# Patient Record
Sex: FEMALE | Race: ASIAN | Employment: OTHER | URBAN - METROPOLITAN AREA
[De-identification: names, ages, dates, MRNs, and addresses within clinical notes are randomized per-mention and may not be internally consistent; named-entity substitution may affect disease eponyms.]

---

## 2017-04-04 PROBLEM — F03.90 DEMENTIA (HCC): Status: ACTIVE | Noted: 2017-04-04

## 2017-04-04 PROBLEM — E78.5 HYPERLIPIDEMIA: Status: ACTIVE | Noted: 2017-04-04

## 2017-04-04 PROBLEM — F41.9 ANXIETY: Status: ACTIVE | Noted: 2017-04-04

## 2017-04-04 PROBLEM — M19.90 OSTEOARTHRITIS: Status: ACTIVE | Noted: 2017-04-04

## 2017-04-04 PROBLEM — I10 HYPERTENSION: Status: ACTIVE | Noted: 2017-04-04

## 2017-04-04 PROBLEM — E03.9 HYPOTHYROIDISM: Status: ACTIVE | Noted: 2017-04-04

## 2017-04-04 PROBLEM — R42 DIZZINESS: Chronic | Status: ACTIVE | Noted: 2017-04-04

## 2017-04-04 PROBLEM — I73.9 PVD (PERIPHERAL VASCULAR DISEASE) (HCC): Status: ACTIVE | Noted: 2017-04-04

## 2017-04-22 ENCOUNTER — APPOINTMENT (OUTPATIENT)
Dept: CT IMAGING | Age: 82
DRG: 064 | End: 2017-04-22
Attending: EMERGENCY MEDICINE
Payer: MEDICARE

## 2017-04-22 ENCOUNTER — HOSPITAL ENCOUNTER (INPATIENT)
Age: 82
LOS: 9 days | Discharge: NURSING, REHAB OR LONG-TERM CARE | DRG: 064 | End: 2017-05-01
Attending: EMERGENCY MEDICINE | Admitting: HOSPITALIST
Payer: MEDICARE

## 2017-04-22 ENCOUNTER — APPOINTMENT (OUTPATIENT)
Dept: GENERAL RADIOLOGY | Age: 82
DRG: 064 | End: 2017-04-22
Attending: HOSPITALIST
Payer: MEDICARE

## 2017-04-22 DIAGNOSIS — I63.9 CEREBROVASCULAR ACCIDENT (CVA), UNSPECIFIED MECHANISM (HCC): Primary | ICD-10-CM

## 2017-04-22 PROBLEM — I16.0 HYPERTENSIVE URGENCY: Status: ACTIVE | Noted: 2017-04-22

## 2017-04-22 PROBLEM — G45.9 TIA (TRANSIENT ISCHEMIC ATTACK): Status: ACTIVE | Noted: 2017-04-22

## 2017-04-22 PROBLEM — I48.0 PAROXYSMAL ATRIAL FIBRILLATION (HCC): Chronic | Status: ACTIVE | Noted: 2017-04-22

## 2017-04-22 LAB
ALBUMIN SERPL BCP-MCNC: 3.8 G/DL (ref 3.2–4.6)
ALBUMIN/GLOB SERPL: 1 {RATIO} (ref 1.2–3.5)
ALP SERPL-CCNC: 82 U/L (ref 50–136)
ALT SERPL-CCNC: 32 U/L (ref 12–65)
ANION GAP BLD CALC-SCNC: 9 MMOL/L (ref 7–16)
APPEARANCE UR: ABNORMAL
APTT PPP: 103.9 SEC (ref 23.5–31.7)
APTT PPP: 30.6 SEC (ref 23.5–31.7)
AST SERPL W P-5'-P-CCNC: 71 U/L (ref 15–37)
ATRIAL RATE: 107 BPM
ATRIAL RATE: 166 BPM
ATRIAL RATE: 85 BPM
BACTERIA SPEC CULT: NORMAL
BACTERIA URNS QL MICRO: ABNORMAL /HPF
BASOPHILS # BLD AUTO: 0 K/UL (ref 0–0.2)
BASOPHILS # BLD AUTO: 0 K/UL (ref 0–0.2)
BASOPHILS # BLD: 0 % (ref 0–2)
BASOPHILS # BLD: 0 % (ref 0–2)
BILIRUB SERPL-MCNC: 1 MG/DL (ref 0.2–1.1)
BILIRUB UR QL: NEGATIVE
BUN SERPL-MCNC: 27 MG/DL (ref 8–23)
CALCIUM SERPL-MCNC: 8.9 MG/DL (ref 8.3–10.4)
CALCULATED R AXIS, ECG10: 52 DEGREES
CALCULATED R AXIS, ECG10: 55 DEGREES
CALCULATED R AXIS, ECG10: 59 DEGREES
CALCULATED T AXIS, ECG11: -16 DEGREES
CALCULATED T AXIS, ECG11: -164 DEGREES
CALCULATED T AXIS, ECG11: 92 DEGREES
CHLORIDE SERPL-SCNC: 107 MMOL/L (ref 98–107)
CK SERPL-CCNC: 2134 U/L (ref 21–215)
CO2 SERPL-SCNC: 28 MMOL/L (ref 21–32)
COLOR UR: YELLOW
CREAT SERPL-MCNC: 0.94 MG/DL (ref 0.6–1)
DIAGNOSIS, 93000: NORMAL
DIFFERENTIAL METHOD BLD: ABNORMAL
DIFFERENTIAL METHOD BLD: ABNORMAL
EOSINOPHIL # BLD: 0 K/UL (ref 0–0.8)
EOSINOPHIL # BLD: 0 K/UL (ref 0–0.8)
EOSINOPHIL NFR BLD: 0 % (ref 0.5–7.8)
EOSINOPHIL NFR BLD: 0 % (ref 0.5–7.8)
EPI CELLS #/AREA URNS HPF: ABNORMAL /HPF
ERYTHROCYTE [DISTWIDTH] IN BLOOD BY AUTOMATED COUNT: 13.6 % (ref 11.9–14.6)
ERYTHROCYTE [DISTWIDTH] IN BLOOD BY AUTOMATED COUNT: 13.8 % (ref 11.9–14.6)
GLOBULIN SER CALC-MCNC: 3.7 G/DL (ref 2.3–3.5)
GLUCOSE BLD STRIP.AUTO-MCNC: 109 MG/DL (ref 65–100)
GLUCOSE BLD STRIP.AUTO-MCNC: 141 MG/DL (ref 65–100)
GLUCOSE SERPL-MCNC: 110 MG/DL (ref 65–100)
GLUCOSE UR STRIP.AUTO-MCNC: NEGATIVE MG/DL
HCT VFR BLD AUTO: 52.4 % (ref 35.8–46.3)
HCT VFR BLD AUTO: 53.2 % (ref 35.8–46.3)
HGB BLD-MCNC: 17.3 G/DL (ref 11.7–15.4)
HGB BLD-MCNC: 17.7 G/DL (ref 11.7–15.4)
HGB UR QL STRIP: ABNORMAL
IMM GRANULOCYTES # BLD: 0 K/UL (ref 0–0.5)
IMM GRANULOCYTES # BLD: 0 K/UL (ref 0–0.5)
IMM GRANULOCYTES NFR BLD AUTO: 0.2 % (ref 0–5)
IMM GRANULOCYTES NFR BLD AUTO: 0.3 % (ref 0–5)
INR BLD: 1.1 (ref 0.9–1.2)
KETONES UR QL STRIP.AUTO: >80 MG/DL
LACTATE BLD-SCNC: 1.8 MMOL/L (ref 0.5–1.9)
LEUKOCYTE ESTERASE UR QL STRIP.AUTO: ABNORMAL
LYMPHOCYTES # BLD AUTO: 11 % (ref 13–44)
LYMPHOCYTES # BLD AUTO: 7 % (ref 13–44)
LYMPHOCYTES # BLD: 0.8 K/UL (ref 0.5–4.6)
LYMPHOCYTES # BLD: 1.2 K/UL (ref 0.5–4.6)
MCH RBC QN AUTO: 29.7 PG (ref 26.1–32.9)
MCH RBC QN AUTO: 29.7 PG (ref 26.1–32.9)
MCHC RBC AUTO-ENTMCNC: 33 G/DL (ref 31.4–35)
MCHC RBC AUTO-ENTMCNC: 33.3 G/DL (ref 31.4–35)
MCV RBC AUTO: 89.4 FL (ref 79.6–97.8)
MCV RBC AUTO: 89.9 FL (ref 79.6–97.8)
MONOCYTES # BLD: 0.6 K/UL (ref 0.1–1.3)
MONOCYTES # BLD: 0.9 K/UL (ref 0.1–1.3)
MONOCYTES NFR BLD AUTO: 5 % (ref 4–12)
MONOCYTES NFR BLD AUTO: 8 % (ref 4–12)
MYOGLOBIN SERPL-MCNC: 1900 NG/ML (ref 9–82)
NEUTS SEG # BLD: 10.4 K/UL (ref 1.7–8.2)
NEUTS SEG # BLD: 9 K/UL (ref 1.7–8.2)
NEUTS SEG NFR BLD AUTO: 81 % (ref 43–78)
NEUTS SEG NFR BLD AUTO: 88 % (ref 43–78)
NITRITE UR QL STRIP.AUTO: POSITIVE
OTHER OBSERVATIONS,UCOM: ABNORMAL
PH UR STRIP: 6 [PH] (ref 5–9)
PLATELET # BLD AUTO: 137 K/UL (ref 150–450)
PLATELET # BLD AUTO: 141 K/UL (ref 150–450)
PMV BLD AUTO: 14.1 FL (ref 10.8–14.1)
PMV BLD AUTO: ABNORMAL FL (ref 10.8–14.1)
POTASSIUM SERPL-SCNC: 4.1 MMOL/L (ref 3.5–5.1)
PROT SERPL-MCNC: 7.5 G/DL (ref 6.3–8.2)
PROT UR STRIP-MCNC: 100 MG/DL
PT BLD: 13.6 SECS (ref 9.6–11.6)
Q-T INTERVAL, ECG07: 266 MS
Q-T INTERVAL, ECG07: 294 MS
Q-T INTERVAL, ECG07: 316 MS
QRS DURATION, ECG06: 66 MS
QRS DURATION, ECG06: 72 MS
QRS DURATION, ECG06: 74 MS
QTC CALCULATION (BEZET), ECG08: 395 MS
QTC CALCULATION (BEZET), ECG08: 413 MS
QTC CALCULATION (BEZET), ECG08: 436 MS
RBC # BLD AUTO: 5.83 M/UL (ref 4.05–5.25)
RBC # BLD AUTO: 5.95 M/UL (ref 4.05–5.25)
RBC #/AREA URNS HPF: ABNORMAL /HPF
SERVICE CMNT-IMP: NORMAL
SODIUM SERPL-SCNC: 144 MMOL/L (ref 136–145)
SP GR UR REFRACTOMETRY: 1.03 (ref 1–1.02)
TROPONIN I BLD-MCNC: 0.09 NG/ML (ref 0–0.08)
TROPONIN I SERPL-MCNC: 0.22 NG/ML (ref 0.02–0.05)
UROBILINOGEN UR QL STRIP.AUTO: 0.2 EU/DL (ref 0.2–1)
VENTRICULAR RATE, ECG03: 103 BPM
VENTRICULAR RATE, ECG03: 109 BPM
VENTRICULAR RATE, ECG03: 162 BPM
WBC # BLD AUTO: 11.2 K/UL (ref 4.3–11.1)
WBC # BLD AUTO: 11.9 K/UL (ref 4.3–11.1)
WBC URNS QL MICRO: >100 /HPF

## 2017-04-22 PROCEDURE — 84484 ASSAY OF TROPONIN QUANT: CPT

## 2017-04-22 PROCEDURE — 74011000250 HC RX REV CODE- 250: Performed by: HOSPITALIST

## 2017-04-22 PROCEDURE — 77030034849

## 2017-04-22 PROCEDURE — 87088 URINE BACTERIA CULTURE: CPT | Performed by: HOSPITALIST

## 2017-04-22 PROCEDURE — 0T9B70Z DRAINAGE OF BLADDER WITH DRAINAGE DEVICE, VIA NATURAL OR ARTIFICIAL OPENING: ICD-10-PCS | Performed by: HOSPITALIST

## 2017-04-22 PROCEDURE — 96365 THER/PROPH/DIAG IV INF INIT: CPT | Performed by: EMERGENCY MEDICINE

## 2017-04-22 PROCEDURE — 97162 PT EVAL MOD COMPLEX 30 MIN: CPT

## 2017-04-22 PROCEDURE — 74011250636 HC RX REV CODE- 250/636: Performed by: HOSPITALIST

## 2017-04-22 PROCEDURE — 74011000258 HC RX REV CODE- 258: Performed by: INTERNAL MEDICINE

## 2017-04-22 PROCEDURE — 74011000250 HC RX REV CODE- 250: Performed by: EMERGENCY MEDICINE

## 2017-04-22 PROCEDURE — 77030019605

## 2017-04-22 PROCEDURE — 81001 URINALYSIS AUTO W/SCOPE: CPT | Performed by: EMERGENCY MEDICINE

## 2017-04-22 PROCEDURE — 93005 ELECTROCARDIOGRAM TRACING: CPT | Performed by: EMERGENCY MEDICINE

## 2017-04-22 PROCEDURE — 87040 BLOOD CULTURE FOR BACTERIA: CPT | Performed by: HOSPITALIST

## 2017-04-22 PROCEDURE — 85610 PROTHROMBIN TIME: CPT

## 2017-04-22 PROCEDURE — 74011000250 HC RX REV CODE- 250

## 2017-04-22 PROCEDURE — 87186 SC STD MICRODIL/AGAR DIL: CPT | Performed by: HOSPITALIST

## 2017-04-22 PROCEDURE — 92610 EVALUATE SWALLOWING FUNCTION: CPT

## 2017-04-22 PROCEDURE — 85730 THROMBOPLASTIN TIME PARTIAL: CPT | Performed by: HOSPITALIST

## 2017-04-22 PROCEDURE — 71010 XR CHEST PORT: CPT

## 2017-04-22 PROCEDURE — 36415 COLL VENOUS BLD VENIPUNCTURE: CPT | Performed by: HOSPITALIST

## 2017-04-22 PROCEDURE — 93306 TTE W/DOPPLER COMPLETE: CPT

## 2017-04-22 PROCEDURE — 83874 ASSAY OF MYOGLOBIN: CPT | Performed by: EMERGENCY MEDICINE

## 2017-04-22 PROCEDURE — 70450 CT HEAD/BRAIN W/O DYE: CPT

## 2017-04-22 PROCEDURE — 74011250636 HC RX REV CODE- 250/636: Performed by: EMERGENCY MEDICINE

## 2017-04-22 PROCEDURE — 95816 EEG AWAKE AND DROWSY: CPT | Performed by: HOSPITALIST

## 2017-04-22 PROCEDURE — 96372 THER/PROPH/DIAG INJ SC/IM: CPT | Performed by: EMERGENCY MEDICINE

## 2017-04-22 PROCEDURE — 96375 TX/PRO/DX INJ NEW DRUG ADDON: CPT | Performed by: EMERGENCY MEDICINE

## 2017-04-22 PROCEDURE — 77030032490 HC SLV COMPR SCD KNE COVD -B

## 2017-04-22 PROCEDURE — 65610000001 HC ROOM ICU GENERAL

## 2017-04-22 PROCEDURE — 97112 NEUROMUSCULAR REEDUCATION: CPT

## 2017-04-22 PROCEDURE — 82962 GLUCOSE BLOOD TEST: CPT

## 2017-04-22 PROCEDURE — 77010033678 HC OXYGEN DAILY

## 2017-04-22 PROCEDURE — 87086 URINE CULTURE/COLONY COUNT: CPT | Performed by: HOSPITALIST

## 2017-04-22 PROCEDURE — 80053 COMPREHEN METABOLIC PANEL: CPT | Performed by: EMERGENCY MEDICINE

## 2017-04-22 PROCEDURE — 99285 EMERGENCY DEPT VISIT HI MDM: CPT | Performed by: EMERGENCY MEDICINE

## 2017-04-22 PROCEDURE — 82550 ASSAY OF CK (CPK): CPT | Performed by: EMERGENCY MEDICINE

## 2017-04-22 PROCEDURE — 83605 ASSAY OF LACTIC ACID: CPT

## 2017-04-22 PROCEDURE — 87641 MR-STAPH DNA AMP PROBE: CPT | Performed by: HOSPITALIST

## 2017-04-22 PROCEDURE — 74011000258 HC RX REV CODE- 258: Performed by: HOSPITALIST

## 2017-04-22 PROCEDURE — 74011250636 HC RX REV CODE- 250/636: Performed by: INTERNAL MEDICINE

## 2017-04-22 PROCEDURE — 85025 COMPLETE CBC W/AUTO DIFF WBC: CPT | Performed by: EMERGENCY MEDICINE

## 2017-04-22 RX ORDER — HEPARIN SODIUM 5000 [USP'U]/ML
60 INJECTION, SOLUTION INTRAVENOUS; SUBCUTANEOUS ONCE
Status: COMPLETED | OUTPATIENT
Start: 2017-04-22 | End: 2017-04-22

## 2017-04-22 RX ORDER — HEPARIN SODIUM 5000 [USP'U]/100ML
12-25 INJECTION, SOLUTION INTRAVENOUS
Status: DISCONTINUED | OUTPATIENT
Start: 2017-04-22 | End: 2017-04-25

## 2017-04-22 RX ORDER — ASPIRIN 81 MG/1
81 TABLET ORAL DAILY
Status: DISCONTINUED | OUTPATIENT
Start: 2017-04-22 | End: 2017-04-23

## 2017-04-22 RX ORDER — ENOXAPARIN SODIUM 100 MG/ML
40 INJECTION SUBCUTANEOUS EVERY 24 HOURS
Status: DISCONTINUED | OUTPATIENT
Start: 2017-04-22 | End: 2017-04-22

## 2017-04-22 RX ORDER — DILTIAZEM HYDROCHLORIDE 5 MG/ML
INJECTION INTRAVENOUS
Status: COMPLETED
Start: 2017-04-22 | End: 2017-04-22

## 2017-04-22 RX ORDER — DILTIAZEM HYDROCHLORIDE 5 MG/ML
20 INJECTION INTRAVENOUS ONCE
Status: COMPLETED | OUTPATIENT
Start: 2017-04-22 | End: 2017-04-22

## 2017-04-22 RX ORDER — PANTOPRAZOLE SODIUM 40 MG/1
40 TABLET, DELAYED RELEASE ORAL
Status: DISCONTINUED | OUTPATIENT
Start: 2017-04-23 | End: 2017-04-23

## 2017-04-22 RX ORDER — LABETALOL HYDROCHLORIDE 5 MG/ML
20 INJECTION, SOLUTION INTRAVENOUS
Status: DISCONTINUED | OUTPATIENT
Start: 2017-04-22 | End: 2017-04-22

## 2017-04-22 RX ORDER — ACETAMINOPHEN 325 MG/1
650 TABLET ORAL
Status: DISCONTINUED | OUTPATIENT
Start: 2017-04-22 | End: 2017-05-01 | Stop reason: HOSPADM

## 2017-04-22 RX ORDER — LEVOTHYROXINE SODIUM 50 UG/1
50 TABLET ORAL
Status: DISCONTINUED | OUTPATIENT
Start: 2017-04-23 | End: 2017-05-01 | Stop reason: HOSPADM

## 2017-04-22 RX ORDER — HYDRALAZINE HYDROCHLORIDE 20 MG/ML
20 INJECTION INTRAMUSCULAR; INTRAVENOUS
Status: COMPLETED | OUTPATIENT
Start: 2017-04-22 | End: 2017-04-22

## 2017-04-22 RX ORDER — SODIUM CHLORIDE 0.9 % (FLUSH) 0.9 %
5-10 SYRINGE (ML) INJECTION AS NEEDED
Status: DISCONTINUED | OUTPATIENT
Start: 2017-04-22 | End: 2017-05-01 | Stop reason: HOSPADM

## 2017-04-22 RX ORDER — CLOPIDOGREL BISULFATE 75 MG/1
75 TABLET ORAL DAILY
Status: DISCONTINUED | OUTPATIENT
Start: 2017-04-22 | End: 2017-04-22

## 2017-04-22 RX ORDER — SODIUM CHLORIDE 0.9 % (FLUSH) 0.9 %
5-10 SYRINGE (ML) INJECTION EVERY 8 HOURS
Status: DISCONTINUED | OUTPATIENT
Start: 2017-04-22 | End: 2017-05-01 | Stop reason: HOSPADM

## 2017-04-22 RX ORDER — PRAVASTATIN SODIUM 20 MG/1
40 TABLET ORAL
Status: DISCONTINUED | OUTPATIENT
Start: 2017-04-22 | End: 2017-05-01 | Stop reason: HOSPADM

## 2017-04-22 RX ORDER — DIPHENHYDRAMINE HYDROCHLORIDE 50 MG/ML
12.5 INJECTION, SOLUTION INTRAMUSCULAR; INTRAVENOUS ONCE
Status: COMPLETED | OUTPATIENT
Start: 2017-04-22 | End: 2017-04-22

## 2017-04-22 RX ORDER — METOPROLOL TARTRATE 50 MG/1
50 TABLET ORAL 2 TIMES DAILY
Status: DISCONTINUED | OUTPATIENT
Start: 2017-04-22 | End: 2017-04-24

## 2017-04-22 RX ORDER — DILTIAZEM HYDROCHLORIDE 5 MG/ML
10 INJECTION INTRAVENOUS
Status: COMPLETED | OUTPATIENT
Start: 2017-04-22 | End: 2017-04-22

## 2017-04-22 RX ADMIN — HEPARIN SODIUM AND DEXTROSE 12 UNITS/KG/HR: 5000; 5 INJECTION INTRAVENOUS at 12:44

## 2017-04-22 RX ADMIN — DILTIAZEM HYDROCHLORIDE 20 MG: 5 INJECTION INTRAVENOUS at 11:36

## 2017-04-22 RX ADMIN — Medication 10 ML: at 14:00

## 2017-04-22 RX ADMIN — DIPHENHYDRAMINE HYDROCHLORIDE 12.5 MG: 50 INJECTION, SOLUTION INTRAMUSCULAR; INTRAVENOUS at 12:38

## 2017-04-22 RX ADMIN — PIPERACILLIN SODIUM,TAZOBACTAM SODIUM 3.38 G: 3; .375 INJECTION, POWDER, FOR SOLUTION INTRAVENOUS at 15:30

## 2017-04-22 RX ADMIN — AMIODARONE HYDROCHLORIDE 150 MG: 50 INJECTION, SOLUTION INTRAVENOUS at 19:57

## 2017-04-22 RX ADMIN — HEPARIN SODIUM 2700 UNITS: 5000 INJECTION, SOLUTION INTRAVENOUS; SUBCUTANEOUS at 12:41

## 2017-04-22 RX ADMIN — AMIODARONE HYDROCHLORIDE 1 MG/MIN: 50 INJECTION, SOLUTION INTRAVENOUS at 19:56

## 2017-04-22 RX ADMIN — METHYLPREDNISOLONE SODIUM SUCCINATE 125 MG: 125 INJECTION, POWDER, FOR SOLUTION INTRAMUSCULAR; INTRAVENOUS at 12:38

## 2017-04-22 RX ADMIN — HYDRALAZINE HYDROCHLORIDE 20 MG: 20 INJECTION INTRAMUSCULAR; INTRAVENOUS at 09:54

## 2017-04-22 RX ADMIN — SODIUM CHLORIDE 5 MG/HR: 900 INJECTION, SOLUTION INTRAVENOUS at 11:40

## 2017-04-22 RX ADMIN — DILTIAZEM HYDROCHLORIDE 20 MG: 5 INJECTION INTRAVENOUS at 12:51

## 2017-04-22 RX ADMIN — PIPERACILLIN SODIUM,TAZOBACTAM SODIUM 3.38 G: 3; .375 INJECTION, POWDER, FOR SOLUTION INTRAVENOUS at 21:05

## 2017-04-22 RX ADMIN — Medication 10 ML: at 21:06

## 2017-04-22 RX ADMIN — ENOXAPARIN SODIUM 40 MG: 40 INJECTION SUBCUTANEOUS at 11:35

## 2017-04-22 NOTE — ED NOTES
Patient is resting on stretcher. Patient is on cardiac monitor, cycling vital signs, and continuous pulse ox. Patient denies needs at this time. Stretcher in low position. Side rails x 2 for safety. Call light within reach. Will continue to monitor. Daughter at bedside.

## 2017-04-22 NOTE — PROGRESS NOTES
Patient arrived to ICU room 3104 via stretcher from ED. Patient was drowsy but able to open eyes to voice. , /92, on NC at 2 liters with O2 saturation 98%. Skin was blotchy with red allergic rash on back, neck chest and abdomen. Patient unable to raise right arm up- flaccid. Facial droop on right side. Lower right leg able to wiggle toes slightly. Sensation is present on right side. Patient's eyelids are large and droopy- when asked to open eyes, patient's left eye is slightly visible. Language barrier - from [place] and only able to answer simple questions when asked. Waiting for daughter to arrive to answer more questions.    Current Lines:   #20 Left AC  #20 Left Forearm  #20 Right forearm    Drips:   Cardizem @ 5 mg/hr

## 2017-04-22 NOTE — PROGRESS NOTES
Primary Nurse Jag Yao RN and Saleem Bucio RN performed a dual skin assessment on this patient. Patient has allergic reaction- skin has scattered blochy red rash on back, chest, neck and abdomen. No impairment noted on sacrum- allevyn placed. CHG bath completed.

## 2017-04-22 NOTE — ED NOTES
Verbal/bedside report given to Margarette Blevins RN on [facility]  for routine progression of care. Report consisted of patients Situation, Background, Assessment and Recommendations(SBAR). Opportunity for questions and clarification was provided. Patient is resting on stretcher. Patient is on cardiac monitor, cycling vital signs, and continuous pulse ox. Patient denies needs at this time. Stretcher in low position. Side rails x 2 for safety. Call light within reach. Will continue to monitor. MRI consent will be performed once patient's daughter returns.    Lines:   Peripheral IV 04/22/17 Left Antecubital (Active)   Site Assessment Clean, dry, & intact 4/22/2017  9:32 AM   Phlebitis Assessment 0 4/22/2017  9:32 AM   Infiltration Assessment 0 4/22/2017  9:32 AM   Dressing Status Clean, dry, & intact 4/22/2017  9:32 AM

## 2017-04-22 NOTE — H&P
HOSPITALIST H&P/CONSULT  NAME:  Ap Cerna   Age:  80 y.o.  :   1935   MRN:   [id number]  PCP: Kulwant Giles MD  Consulting MD:  Treatment Team: Attending Provider: Yany Wick MD; Consulting Provider: Moris Brannon MD; Care Manager: Lynette Tavarez RN  HPI:   Patient is 79 y/o  female with pmhx of dyslipidemia, HTN, hypothyroidism, osteoarthritis, PVD who presented to ER in view of right sided weakness, lethargy and drowsiness. Patient was evaluated in ER in presence of patient's daughter. According to the daughter (primary history provider as patient is drowsy), her mother was in usual health until last night. This morning when she went to check on her mother, she looked very weak and complained of right sided weakness. She also wasn't answering questions appropriately. At baseline, she is pretty much active, independent with ADL. ROS could not be obtained, but patient denied having any chest pain or shortness of breath. In the ER, she also had new onset A. Fib with RVR, for which she was started on cardiazem drip after receiving a bolus. Her blood pressure was elevated SBP > 210, DBP > 100, which came down after one dose of IV hydralazine. During A.fib, she started frothing from the mouth, but no seizure like activity was noticed. Complete ROS done and is as stated in HPI or otherwise negative  Past Medical History:   Diagnosis Date    Anxiety 2017    --SEES DR. Abhinav Palma    Dementia 2017    --SEEN NEUROLOGIST - DR. MELENDEZ -     Dizziness 2017    Hyperlipidemia 2017    Hypertension 2017    Hypothyroidism 2017    Osteoarthritis 2017    PVD (peripheral vascular disease) ([facility] [address] 2017    --CAROTIDS      Past Surgical History:   Procedure Laterality Date    HX HYSTERECTOMY        Prior to Admission Medications   Prescriptions Last Dose Informant Patient Reported? Taking?   aspirin delayed-release 81 mg tablet   Yes No   Sig: Take  by mouth daily. calcium carbonate 500 mg calcium (1,250 mg) tab 500 mg, ergocalciferol (vitamin d2) 400 unit tab 200 Units   Yes No   Sig: Take  by mouth daily. levothyroxine (SYNTHROID) 50 mcg tablet   Yes No   Sig: Take  by mouth Daily (before breakfast). metoprolol tartrate (LOPRESSOR) 50 mg tablet   Yes No   Sig: Take  by mouth two (2) times a day. pravastatin (PRAVACHOL) 40 mg tablet   Yes No   Sig: Take 40 mg by mouth nightly. Facility-Administered Medications: None     No Known Allergies   Social History   Substance Use Topics    Smoking status: Never Smoker    Smokeless tobacco: Not on file    Alcohol use Not on file      No family history on file. Objective:     Visit Vitals    /62    Pulse (!) 111    Temp 97.4 °F (36.3 °C)    Resp 30    Ht 5' (1.524 m)    Wt 50.5 kg (111 lb 5.3 oz)    SpO2 96%    BMI 21.74 kg/m2      Temp (24hrs), Av °F (36.7 °C), Min:97.4 °F (36.3 °C), Max:98.5 °F (36.9 °C)    Oxygen Therapy  O2 Sat (%): 96 % (17 1430)  Pulse via Oximetry: 106 beats per minute (17 1430)  O2 Device: Nasal cannula (17 1140)  O2 Flow Rate (L/min): 2 l/min (17 1140)  Physical Exam:  General:    Somnolent, lethargic, NAD     HEENT:           Head AT/NC, PERRLA+, no pallor or ict  Lungs:   Clear to auscultation bilaterally. No Wheezing or Rhonchi. No rales. Heart:   Irregular rhythm, tachycardic, no murmur, gallops, normal S1S2, pulses 2+  Abdomen:   Soft, non-tender. Not distended. Bowel sounds normal.   Extremities: No cyanosis. No edema. No clubbing  Skin:     Texture, turgor normal. No rashes or lesions. Not Jaundiced  Neurologic: GCS 12, motor deficits in RUE and RLE, strength 2/5 in RUE and                         RLE, reflexes diminished on right side.   Psych:          Oriented to self and place, mood and affect flat   Data Review:   Recent Results (from the past 24 hour(s))   CBC WITH AUTOMATED DIFF    Collection Time: 17  9:05 AM   Result Value Ref Range    WBC 11.2 (H) 4.3 - 11.1 K/uL    RBC 5.83 (H) 4.05 - 5.25 M/uL    HGB 17.3 (H) 11.7 - 15.4 g/dL    HCT 52.4 (H) 35.8 - 46.3 %    MCV 89.9 79.6 - 97.8 FL    MCH 29.7 26.1 - 32.9 PG    MCHC 33.0 31.4 - 35.0 g/dL    RDW 13.6 11.9 - 14.6 %    PLATELET 333 (L) 388 - 450 K/uL    MPV 14.1 10.8 - 14.1 FL    DF AUTOMATED      NEUTROPHILS 81 (H) 43 - 78 %    LYMPHOCYTES 11 (L) 13 - 44 %    MONOCYTES 8 4.0 - 12.0 %    EOSINOPHILS 0 (L) 0.5 - 7.8 %    BASOPHILS 0 0.0 - 2.0 %    IMMATURE GRANULOCYTES 0.3 0.0 - 5.0 %    ABS. NEUTROPHILS 9.0 (H) 1.7 - 8.2 K/UL    ABS. LYMPHOCYTES 1.2 0.5 - 4.6 K/UL    ABS. MONOCYTES 0.9 0.1 - 1.3 K/UL    ABS. EOSINOPHILS 0.0 0.0 - 0.8 K/UL    ABS. BASOPHILS 0.0 0.0 - 0.2 K/UL    ABS. IMM. GRANS. 0.0 0.0 - 0.5 K/UL   METABOLIC PANEL, COMPREHENSIVE    Collection Time: 04/22/17  9:05 AM   Result Value Ref Range    Sodium 144 136 - 145 mmol/L    Potassium 4.1 3.5 - 5.1 mmol/L    Chloride 107 98 - 107 mmol/L    CO2 28 21 - 32 mmol/L    Anion gap 9 7 - 16 mmol/L    Glucose 110 (H) 65 - 100 mg/dL    BUN 27 (H) 8 - 23 MG/DL    Creatinine 0.94 0.6 - 1.0 MG/DL    GFR est AA >60 >60 ml/min/1.73m2    GFR est non-AA >60 >60 ml/min/1.73m2    Calcium 8.9 8.3 - 10.4 MG/DL    Bilirubin, total 1.0 0.2 - 1.1 MG/DL    ALT (SGPT) 32 12 - 65 U/L    AST (SGOT) 71 (H) 15 - 37 U/L    Alk.  phosphatase 82 50 - 136 U/L    Protein, total 7.5 6.3 - 8.2 g/dL    Albumin 3.8 3.2 - 4.6 g/dL    Globulin 3.7 (H) 2.3 - 3.5 g/dL    A-G Ratio 1.0 (L) 1.2 - 3.5     MYOGLOBIN    Collection Time: 04/22/17  9:05 AM   Result Value Ref Range    Myoglobin 1900 (H) 9 - 82 ng/mL   CK    Collection Time: 04/22/17  9:05 AM   Result Value Ref Range    CK 2134 (H) 21 - 215 U/L   POC PT/INR    Collection Time: 04/22/17  9:05 AM   Result Value Ref Range    Prothrombin time (POC) 13.6 (H) 9.6 - 11.6 SECS    INR (POC) 1.1 0.9 - 1.2     GLUCOSE, POC    Collection Time: 04/22/17  9:05 AM   Result Value Ref Range    Glucose (POC) 109 (H) 65 - 100 mg/dL   TROPONIN I    Collection Time: 04/22/17  9:05 AM   Result Value Ref Range    Troponin-I, Qt. 0.22 (HH) 0.02 - 0.05 NG/ML   EKG, 12 LEAD, INITIAL    Collection Time: 04/22/17  9:10 AM   Result Value Ref Range    Ventricular Rate 103 BPM    Atrial Rate 107 BPM    QRS Duration 66 ms    Q-T Interval 316 ms    QTC Calculation (Bezet) 413 ms    Calculated R Axis 55 degrees    Calculated T Axis 92 degrees    Diagnosis       !! AGE AND GENDER SPECIFIC ECG ANALYSIS !! Accelerated Junctional rhythm  Nonspecific ST and T wave abnormality  Abnormal ECG  No previous ECGs available     POC LACTIC ACID    Collection Time: 04/22/17  9:13 AM   Result Value Ref Range    Lactic Acid (POC) 1.8 0.5 - 1.9 mmol/L   POC TROPONIN-I    Collection Time: 04/22/17  9:14 AM   Result Value Ref Range    Troponin-I (POC) 0.09 (H) 0.0 - 0.08 ng/ml   URINALYSIS W/ RFLX MICROSCOPIC    Collection Time: 04/22/17 10:06 AM   Result Value Ref Range    Color YELLOW      Appearance TURBID      Specific gravity 1.027 (H) 1.001 - 1.023      pH (UA) 6.0 5.0 - 9.0      Protein 100 (A) NEG mg/dL    Glucose NEGATIVE  mg/dL    Ketone >80 (A) NEG mg/dL    Bilirubin NEGATIVE  NEG      Blood LARGE (A) NEG      Urobilinogen 0.2 0.2 - 1.0 EU/dL    Nitrites POSITIVE (A) NEG      Leukocyte Esterase LARGE (A) NEG      WBC >100 0 /hpf    RBC 3-5 0 /hpf    Epithelial cells 5-10 0 /hpf    Bacteria 4+ (H) 0 /hpf    Other observations OCCASIONAL WBC CLUMPS PRESENT    EKG, 12 LEAD, SUBSEQUENT    Collection Time: 04/22/17 11:30 AM   Result Value Ref Range    Ventricular Rate 162 BPM    Atrial Rate 166 BPM    QRS Duration 72 ms    Q-T Interval 266 ms    QTC Calculation (Bezet) 436 ms    Calculated R Axis 59 degrees    Calculated T Axis -164 degrees    Diagnosis       !! AGE AND GENDER SPECIFIC ECG ANALYSIS !!   Atrial fibrillation with rapid ventricular response  Marked ST abnormality, possible inferior subendocardial injury  Abnormal ECG  When compared with ECG of 22-APR-2017 09:10,  Atrial fibrillation has replaced Junctional rhythm  Vent. rate has increased BY  59 BPM  ST now depressed in Inferior leads  Nonspecific T wave abnormality now evident in Inferior leads     GLUCOSE, POC    Collection Time: 04/22/17 11:31 AM   Result Value Ref Range    Glucose (POC) 141 (H) 65 - 100 mg/dL   EKG, 12 LEAD, SUBSEQUENT    Collection Time: 04/22/17 11:37 AM   Result Value Ref Range    Ventricular Rate 109 BPM    Atrial Rate 85 BPM    QRS Duration 74 ms    Q-T Interval 294 ms    QTC Calculation (Bezet) 395 ms    Calculated R Axis 52 degrees    Calculated T Axis -16 degrees    Diagnosis       !! AGE AND GENDER SPECIFIC ECG ANALYSIS !! Atrial fibrillation with rapid ventricular response  ST & T wave abnormality, consider inferior ischemia or digitalis effect  Abnormal ECG  When compared with ECG of 22-APR-2017 11:30,  Nonspecific T wave abnormality now evident in Anterior leads     MRSA SCREEN - PCR (NASAL)    Collection Time: 04/22/17 12:09 PM   Result Value Ref Range    Special Requests: NO SPECIAL REQUESTS      Culture result:        MRSA target DNA is not detected (presumptive not colonized with MRSA)   PTT    Collection Time: 04/22/17 12:20 PM   Result Value Ref Range    aPTT 30.6 23.5 - 31.7 SEC   CBC WITH AUTOMATED DIFF    Collection Time: 04/22/17  1:44 PM   Result Value Ref Range    WBC 11.9 (H) 4.3 - 11.1 K/uL    RBC 5.95 (H) 4.05 - 5.25 M/uL    HGB 17.7 (H) 11.7 - 15.4 g/dL    HCT 53.2 (H) 35.8 - 46.3 %    MCV 89.4 79.6 - 97.8 FL    MCH 29.7 26.1 - 32.9 PG    MCHC 33.3 31.4 - 35.0 g/dL    RDW 13.8 11.9 - 14.6 %    PLATELET 628 (L) 868 - 450 K/uL    MPV Cannot be calulated 10.8 - 14.1 FL    DF AUTOMATED      NEUTROPHILS 88 (H) 43 - 78 %    LYMPHOCYTES 7 (L) 13 - 44 %    MONOCYTES 5 4.0 - 12.0 %    EOSINOPHILS 0 (L) 0.5 - 7.8 %    BASOPHILS 0 0.0 - 2.0 %    IMMATURE GRANULOCYTES 0.2 0.0 - 5.0 %    ABS. NEUTROPHILS 10.4 (H) 1.7 - 8.2 K/UL    ABS.  LYMPHOCYTES 0.8 0.5 - 4.6 K/UL ABS. MONOCYTES 0.6 0.1 - 1.3 K/UL    ABS. EOSINOPHILS 0.0 0.0 - 0.8 K/UL    ABS. BASOPHILS 0.0 0.0 - 0.2 K/UL    ABS. IMM. GRANS. 0.0 0.0 - 0.5 K/UL     Imaging /Procedures /Studies     Assessment and Plan: Active Hospital Problems    Diagnosis Date Noted    TIA (transient ischemic attack) 04/22/2017    Hypertensive urgency  New onset A. Fib with RVR  Acute encephalopathy 2/2 stroke vs seizure 04/22/2017       PLAN  ·  admit to ICU  · Continue and titrate cardiazem and heparin drip for A. Fib with RVR  · Will f/u with MRI brain, MRA neck and head, EEG AND 2d-echo. · Bedside swallow eval failed, continue NPO. Repeat eval tomorrow or FSA  · Cardiology consulted, will f/u with recs. · Zosyn for possible aspiration. · GI prophylaxis with IV protonix. · F/U with HbA1C, lipid panel    Code Status:  full    Anticipated discharge: > 2 MN    Signed By: Aidee Peoples MD     April 22, 2017    CT head showed severe chronic white matter microangiopathic disease with moderate bilateral atrophy. No acute intracranial abnormality. Critical care time spent on admission is 70 minutes.

## 2017-04-22 NOTE — PROGRESS NOTES
Page returned from Dr. Aurelia Meier. Updated with pt condition and events since admission. Orders received to give additional bolus of 20mg cardizem and increase cardizem drip rate to 15mg/hr.

## 2017-04-22 NOTE — CONSULTS
[address] Cardiology Consult           Date of  Admission: 4/22/2017  8:54 AM     Admission type:Emergency    Consulting MD: Dhruv Hwang MD      Primary Cardiologist: none  Primary Care Physician: Rosi Johnson, MD De Souza Emmanuelasyasajan Odell is a 80 y.o. female admitted for TIA (transient ischemic attack); Hypertensive urgency;TIA (t*. Patient complains of right sided weakness and new onset atrial fibrillation with RVR. Atrial fibrillation has converted to SR. Patient remains somnolent with significant right sided weakness. Echo is essentially normal.      Previous treatment/evaluation includes none . Cardiac risk factors: smoking/ tobacco exposure, hypertension. Patient Active Problem List   Diagnosis Code    Hypothyroidism E03.9    Anxiety F41.9    Dementia F03.90    Dizziness R42    Hypertension I10    Hyperlipidemia E78.5    PVD (peripheral vascular disease) ([facility] [address] I73.9    Osteoarthritis M19.90    TIA (transient ischemic attack) G45.9    Hypertensive urgency I16.0    Paroxysmal atrial fibrillation (HCC) I48.0       Past Medical History:   Diagnosis Date    Anxiety 4/4/2017    --SEES DR. Maile Bragg    Dementia 4/4/2017    --SEEN NEUROLOGIST - DR. MELENDEZ -     Dizziness 4/4/2017    Hyperlipidemia 4/4/2017    Hypertension 4/4/2017    Hypothyroidism 4/4/2017    Osteoarthritis 4/4/2017    PVD (peripheral vascular disease) ([facility] [address] 4/4/2017    --CAROTIDS     No Known Allergies   No family history on file. Review of Symptoms:  Review of Systems   Unable to perform ROS: acuity of condition          Physical Exam  Vitals:    04/22/17 1730 04/22/17 1740 04/22/17 1750 04/22/17 1910   BP: 163/62 157/66 155/63 150/60   Pulse: 84 82 85 75   Resp: 19 15 17 17   Temp:    98.7 °F (37.1 °C)   SpO2: 98% 97% 97% 97%   Weight:       Height:           Physical Exam   Constitutional: She is oriented to person, place, and time. She appears well-developed and well-nourished. HENT:   Head: Normocephalic and atraumatic. Eyes: Conjunctivae are normal. Pupils are equal, round, and reactive to light. Neck: Neck supple. No thyromegaly present. Cardiovascular: Normal rate, regular rhythm and normal heart sounds. Pulmonary/Chest: Breath sounds normal.   Abdominal: Soft. Bowel sounds are normal.   Musculoskeletal: Normal range of motion. Neurological: She is alert and oriented to person, place, and time. Skin: Skin is dry. Cardiographics    Telemetry: normal sinus rhythm  ECG: atrial fibrillation, rate 103  Echocardiogram: Normal and reviewed by myself    Labs:   Recent Labs      04/22/17   1344  04/22/17   0905   NA   --   144   K   --   4.1   BUN   --   27*   CREA   --   0.94   GLU   --   110*   WBC  11.9*  11.2*   HGB  17.7*  17.3*   HCT  53.2*  52.4*   PLT  137*  141*   INR   --   1.1        Assessment/Plan:     Assessment:      Active Problems:    TIA (transient ischemic attack) (4/22/2017) - MRI pending. CT (-). CVA likely secondary to atrial fibrillation and embolic event. DC clopidogrel as pateint will need long-term anticoagulation with NOAC or warfarin. Continue heparin at this time. Hypertensive urgency (4/22/2017) - BP now controlled      Paroxysmal atrial fibrillation ([address] (4/22/2017) - In SR and will start IV amiodarone. Change to PO in 24hrs. On IV heparin and if stable after 24hrs would change to Eliquis/Xarelto. Thank you very much for this referral. We appreciate the opportunity to participate in this patient's care. We will follow along with above stated plan.     MD Rosi Mcgee MD

## 2017-04-22 NOTE — ED NOTES
TRANSFER - OUT REPORT:    Verbal report given to Teddy Chen RN on Pen Degroft  being transferred to [facility] for routine progression of care       Report consisted of patients Situation, Background, Assessment and   Recommendations(SBAR). Information from the following report(s) SBAR, Kardex, ED Summary, MAR, Accordion and Recent Results was reviewed with the receiving nurse. Lines:   Peripheral IV 04/22/17 Left Antecubital (Active)   Site Assessment Clean, dry, & intact 4/22/2017  9:32 AM   Phlebitis Assessment 0 4/22/2017  9:32 AM   Infiltration Assessment 0 4/22/2017  9:32 AM   Dressing Status Clean, dry, & intact 4/22/2017  9:32 AM        Opportunity for questions and clarification was provided.       Patient transported with:   Monitor  O2 @ 2 liters  Registered Nurse

## 2017-04-22 NOTE — PROGRESS NOTES
Tried calling patient's daughter named Mattykarlamelvin Alex to get telephone MRI consent. No answer. Will try again.

## 2017-04-22 NOTE — PROGRESS NOTES
LEAPFROG PROTOCOL NOTE    Pen Degroft  4/22/2017    The patient is currently in the critical care setting managed by Dr. Laura Woodruff with a CVA. The patient's chart is reviewed and the patient is discussed with the staff. Patient is currently hemodynamically stable. Patient has no needs identified for Intensivist management in the critical care setting at this time. Please notify us if can be of assistance. No charge billed to the patient. Thank you.     Kelly Leo MD

## 2017-04-22 NOTE — ED TRIAGE NOTES
Patient arrives to the ER via EMS. Report from EMS is the patient was found on the floor by her daughter around 0. Patient was not speaking in sentences, had odd speech, and had a \"funky\" looking right side of her face. Patient was not seen this morning prior to event. Patient went to bed last night at her baseline. VSS with EMS. POC BGL with EMS was 93. Dr. Astrid Latif at bedside. Code S called. Patient escorted by this RN and EMS personnel to CT.

## 2017-04-22 NOTE — PROGRESS NOTES
Cardizem drip changed to 15 mg/hr per MD orders and bolus of Cardizem 20 mg IVP injection given. Will continue to monitor closely.

## 2017-04-22 NOTE — PROGRESS NOTES
Paged returned by Dr. Abhay Cohen. Updated about patient's allergic reaction rash on neck, back, chest and abdomen. Orders received to administer Benadryl 12.5 mg IVP and Solu-medrol 125 mg IVP for allergic reaction.

## 2017-04-22 NOTE — PROGRESS NOTES
Patient resting comfortably in bed. No other neuro changes since arrival. After cardizem bolus, BP at 123/50 with HR at 97. Will continue to monitor.

## 2017-04-22 NOTE — PROGRESS NOTES
Problem: Mobility Impaired (Adult and Pediatric)  Goal: *Therapy Goal (Edit Goal, Insert Text)  Right sided hemiplegia  STG:  (1.)Ms. Fly Conner will move from supine to sit and sit to supine , scoot up and down and roll side to side with MODERATE ASSIST within 7 day(s). (2.)Ms. Fly Conner will transfer from bed to chair and chair to bed with MODERATE ASSIST using the least restrictive device within 7 day(s). (3.)Ms. Fly Conner will ambulate with MAXIMAL ASSIST for 5 feet with the least restrictive device within 7 day(s). LTG:  (1.)Ms. Fly Conner will move from supine to sit and sit to supine , scoot up and down and roll side to side in bed with MINIMAL ASSIST within 14 day(s). (2.)Ms. Fly Conner will transfer from bed to chair and chair to bed with MINIMAL ASSIST using the least restrictive device within 14 day(s). (3.)Ms. Fly Conner will ambulate with MODERATE ASSIST for 25 feet with the least restrictive device within 14 day(s). ________________________________________________________________________________________________       PHYSICAL THERAPY: INITIAL ASSESSMENT, TREATMENT DAY: DAY OF ASSESSMENT 4/22/2017  INPATIENT: Hospital Day: 1  Payor: /      NAME/AGE/GENDER: Ap Conner is a 80 y.o. female         PRIMARY DIAGNOSIS: TIA (transient ischemic attack)  Hypertensive urgency  TIA (transient ischemic attack)  Hypertensive urgency <principal problem not specified> <principal problem not specified>        ICD-10: Treatment Diagnosis:       · Paralytic gait (R26.1)  · Hemiplegia and hemiparesis following cerebral infarction affecting right dominant side (C99.111)   Precaution/Allergies:  Review of patient's allergies indicates no known allergies. ASSESSMENT:      Ms. Fly Conner presents with right sided hemiplegia with decreased transfers, ambulation and mobility with above diagnosis.     She demonstrates transfers of maximal assistance x 1 out of bed to sit with right sided support and then total assistance x 1 with attempt to stand. The right side is flaccid. EOB in ICU neuromuscular reeducation is performed for static and dynamic sitting balance. Patient transfers back to bed with maximal assistance x 1 to supine. Skilled PT is indicated for patient safety and mobility progression during current acute care episode. This section established at most recent assessment   PROBLEM LIST (Impairments causing functional limitations):  1. Decreased Strength  2. Decreased ADL/Functional Activities  3. Decreased Transfer Abilities  4. Decreased Ambulation Ability/Technique  5. Decreased Balance  6. Decreased Activity Tolerance  7. Decreased Pacing Skills  8. Decreased Flexibility/Joint Mobility  9. Decreased Bureau with Home Exercise Program    INTERVENTIONS PLANNED: (Benefits and precautions of physical therapy have been discussed with the patient.)  1. Balance Exercise  2. Bed Mobility  3. Family Education  4. Gait Training  5. Home Exercise Program (HEP)  6. Range of Motion (ROM)  7. Therapeutic Activites  8. Therapeutic Exercise/Strengthening  9. Transfer Training  10. Neuromuscular reeducation      TREATMENT PLAN: Frequency/Duration: 3-5 times a week for duration of hospital stay  Rehabilitation Potential For Stated Goals: GOOD      RECOMMENDED REHABILITATION/EQUIPMENT: (at time of discharge pending progress): Continue Skilled Therapy and and to be determined at medical discharge. HISTORY:   History of Present Injury/Illness (Reason for Referral):  PER MD H&P  Patient is 79 y/o  female with pmhx of dyslipidemia, HTN, hypothyroidism, osteoarthritis, PVD who presented to ER in view of right sided weakness, lethargy and drowsiness. Patient was evaluated in ER in presence of patient's daughter. According to the daughter (primary history provider as patient is drowsy), her mother was in usual health until last night.  This morning when she went to check on her mother, she looked very weak and complained of right sided weakness. She also wasn't answering questions appropriately. At baseline, she is pretty much active, independent with ADL. ROS could not be obtained, but patient denied having any chest pain or shortness of breath. In the ER, she also had new onset A. Fib with RVR, for which she was started on cardiazem drip after receiving a bolus. Her blood pressure was elevated SBP > 210, DBP > 100, which came down after one dose of IV hydralazine. During A.fib, she started frothing from the mouth, but no seizure like activity was noticed. Past Medical History/Comorbidities:   Ms. Missy Romo  has a past medical history of Anxiety (4/4/2017); Dementia (4/4/2017); Dizziness (4/4/2017); Hyperlipidemia (4/4/2017); Hypertension (4/4/2017); Hypothyroidism (4/4/2017); Osteoarthritis (4/4/2017); and PVD (peripheral vascular disease) ([facility] [address] (4/4/2017). Ms. Missy Romo  has a past surgical history that includes hysterectomy. Social History/Living Environment:     Per patient daughter, patient lives with her and walk around the house only. Prior Level of Function/Work/Activity:  Limited to in house ambulation and mobility only per patient   Dominant Side:         RIGHT  Personal Factors:          Sex:  female        Age:  80 y.o. Number of Personal Factors/Comorbidities that affect the Plan of Care: 1-2: MODERATE COMPLEXITY   EXAMINATION:   Most Recent Physical Functioning:   Gross Assessment:  AROM: Grossly decreased, non-functional  Strength: Grossly decreased, non-functional (right sided nonfunctional)  Coordination: Grossly decreased, non-functional  Tone: Abnormal  Sensation: Impaired               Posture:  Posture (WDL): Exceptions to WDL  Posture Assessment: Cervical, Forward head  Balance:  Sitting: Impaired  Sitting - Static: Poor (constant support); Prop sitting;Supported sitting  Sitting - Dynamic: Poor (constant support)  Standing: Impaired  Standing - Static: Constant support; Other (comment) (unable to fully stand at this time )  Standing - Dynamic : Poor Bed Mobility:  Rolling: Maximum assistance  Supine to Sit: Maximum assistance  Sit to Supine: Maximum assistance  Scooting: Maximum assistance  Wheelchair Mobility:     Transfers:  Sit to Stand: Total assistance  Stand to Sit: Total assistance  Gait:             Body Structures Involved:  1. Metabolic  2. Bones  3. Joints  4. Muscles  5. Ligaments Body Functions Affected:  1. Neuromusculoskeletal  2. Movement Related  3. Skin Related  4. Metobolic/Endocrine Activities and Participation Affected:  1. General Tasks and Demands  2. Mobility  3. Self Care  4. Community, Social and [place]   Number of elements that affect the Plan of Care: 4+: HIGH COMPLEXITY   CLINICAL PRESENTATION:   Presentation: Evolving clinical presentation with changing clinical characteristics: MODERATE COMPLEXITY   CLINICAL DECISION MAKIN [facility] Inpatient Short Form  How much difficulty does the patient currently have. .. Unable A Lot A Little None   1. Turning over in bed (including adjusting bedclothes, sheets and blankets)? [ ] 1   [X] 2   [ ] 3   [ ] 4   2. Sitting down on and standing up from a chair with arms ( e.g., wheelchair, bedside commode, etc.)   [ ] 1   [X] 2   [ ] 3   [ ] 4   3. Moving from lying on back to sitting on the side of the bed? [ ] 1   [X] 2   [ ] 3   [ ] 4   How much help from another person does the patient currently need. .. Total A Lot A Little None   4. Moving to and from a bed to a chair (including a wheelchair)? [X] 1   [ ] 2   [ ] 3   [ ] 4   5. Need to walk in hospital room? [X] 1   [ ] 2   [ ] 3   [ ] 4   6. Climbing 3-5 steps with a railing? [X] 1   [ ] 2   [ ] 3   [ ] 4   © , Trustees of [address] [place] [address] [address] [id number], under license to [employer].  All rights reserved    Score:  Initial: 9 Most Recent: X (Date: -- )     Interpretation of Tool:  Represents activities that are increasingly more difficult (i.e. Bed mobility, Transfers, Gait). Score 24 23 22-20 19-15 14-10 9-7 6       Modifier CH CI CJ CK CL CM CN         · Mobility - Walking and Moving Around:               - CURRENT STATUS:    CM - 80%-99% impaired, limited or restricted               - GOAL STATUS:           CL - 60%-79% impaired, limited or restricted               - D/C STATUS:                       ---------------To be determined---------------  Payor: /       Medical Necessity:     · Patient demonstrates fair rehab potential due to higher previous functional level. Reason for Services/Other Comments:  · Patient continues to require skilled intervention due to medical complications, patient unable to attend/participate in therapy as expected and s/p CVA affecting right side. Use of outcome tool(s) and clinical judgement create a POC that gives a: Questionable prediction of patient's progress: MODERATE COMPLEXITY                 TREATMENT:   (In addition to Assessment/Re-Assessment sessions the following treatments were rendered)   Pre-treatment Symptoms/Complaints:  0/10 at this time. Pain: Initial:   Pain Intensity 1: 0 (no reported pain or grimacing but patient is aphasic at this)  Post Session:  0/10   IN addition to initial evaluation :   Neuromuscular Re-education: ( 8 mins):  Exercise/activities per grid below to improve balance, coordination, kinesthetic sense, posture and proprioception. Required minimal visual, verbal, manual and tactile cues to promote motor control of right, lower extremity(s). Braces/Orthotics/Lines/Etc:   · IV  · mantilla catheter  · O2 Device: Nasal cannula  Treatment/Session Assessment:    · Response to Treatment:  Initial evaluation and neuromuscular reeducation with right sided profound flaccid hemiplegia.    · Interdisciplinary Collaboration:  · Physical Therapist  · Registered Nurse  · After treatment position/precautions:  · Supine in bed  · Bed alarm/tab alert on  · Bed/Chair-wheels locked  · Bed in low position  · Caregiver at bedside  · Call light within reach  · RN notified  · Side rails x 3  · Compliance with Program/Exercises: Will assess as treatment progresses. · Recommendations/Intent for next treatment session: \"Next visit will focus on advancements to more challenging activities, reduction in assistance provided and neuromuscular reeducation. \".   Total Treatment Duration:  PT Patient Time In/Time Out  Time In: 1504  Time Out: [address], [place]

## 2017-04-22 NOTE — PROGRESS NOTES
TRANSFER - IN REPORT:    Verbal report received from LILLIE Shelton on Pen Degroft  being received from ED(unit) for routine progression of care      Report consisted of patients Situation, Background, Assessment and   Recommendations(SBAR). Information from the following report(s) SBAR, ED Summary, Intake/Output, MAR, Recent Results and Cardiac Rhythm A-fib was reviewed with the receiving nurse. Opportunity for questions and clarification was provided. Assessment completed upon patients arrival to unit and care assumed.

## 2017-04-22 NOTE — ED NOTES
Patient had an increase in heart rate found by monitor 150-162 in afib bpm and /103. Patient was found to be having foaming at the mouth. Patient was suctioned. Dr. Ren Ocampo at bedside. POC .

## 2017-04-22 NOTE — PROGRESS NOTES
Problem: Dysphagia (Adult)  Goal: *Acute Goals and Plan of Care (Insert Text)  ST. Pt. Will tolerate PO trials with SLP at bedside with no overt s/sx of aspiration/penetration. 2. Patient will complete bedside swallow evaluation with 100% participation. 3. Pt. Will participate in speech/language/cognitive evaluation with 100% participation. LTG: Pt. Will tolerate least restrictive diet without respiratory decline. SPEECH LANGUAGE PATHOLOGY: BEDSIDE SWALLOW NOTE: INITIAL ASSESSMENT     NAME/AGE/GENDER: Ap Berrios is a 80 y.o. female  DATE: 2017  PRIMARY DIAGNOSIS: TIA (transient ischemic attack)  Hypertensive urgency  TIA (transient ischemic attack)  Hypertensive urgency       ICD-10: Treatment Diagnosis: R13.12 Dysphagia, Oropharyngeal Phase     INTERDISCIPLINARY COLLABORATION: Registered Nurse  PRECAUTIONS/ALLERGIES: Review of patient's allergies indicates no known allergies. ASSESSMENT:   Based on the objective data described below, Ms. Halley Berrios presents with decreased speech and swallowing functions. Unable to state native language at bedside. Did follow some basic commands (i.e. Open your mouth, squeeze my hand) with tactile cues, unable to follow more complex tasks in oral motor examination (i.e. Stick out your tongue). This was complicated by poor visual attention (minimally opening left eye only during evaluation). Weak vocal quality on attempts to verbalize. Poor bolus acceptance, only accepting PO with max verbal and tactile cues. Ice chip with absent swallow initiation, tsp/cup sip thin liquids with severely delayed swallow, weak attempt. Unable to move applesauce from anterior to posterior of oral cavity, swabbed from mouth. Delayed strong coughing after PO presentations. Recommend strict NPO. Will follow for improved ability to tolerate PO at bedside. Patient will benefit from skilled intervention to address the below impairments. ?????? ? ? This section established at most recent assessment??????????  PROBLEM LIST (Impairments causing functional limitations):  1. dysphagia  REHABILITATION POTENTIAL FOR STATED GOALS: FAIR      PLAN OF CARE:   Patient will benefit from skilled intervention to address the following impairments. RECOMMENDATIONS AND PLANNED INTERVENTIONS (Benefits and precautions of therapy have been discussed with the patient.):  · NPO with alternative means of nutrition  MEDICATIONS:  · Non-oral  COMPENSATORY STRATEGIES/MODIFICATIONS INCLUDING:  · None  OTHER RECOMMENDATIONS (including follow up treatment recommendations): · Family training/education  · Patient education  RECOMMENDED DIET MODIFICATIONS DISCUSSED WITH:  · Nursing  · Patient  FREQUENCY/DURATION: Continue to follow patient 2 times a week as able or until goals met. RECOMMENDED REHABILITATION/EQUIPMENT: (at time of discharge pending progress):   Continue Skilled Therapy and Rehab. SUBJECTIVE:   Minimal attempts to voice, weak vocal quality. History of Present Injury/Illness: Ms. Cb Campbell  has a past medical history of Anxiety (4/4/2017); Dementia (4/4/2017); Dizziness (4/4/2017); Hyperlipidemia (4/4/2017); Hypertension (4/4/2017); Hypothyroidism (4/4/2017); Osteoarthritis (4/4/2017); and PVD (peripheral vascular disease) ([facility] [address] (4/4/2017). .  She also  has a past surgical history that includes hysterectomy. Present Symptoms:       Current Medications:   No current facility-administered medications on file prior to encounter. Current Outpatient Prescriptions on File Prior to Encounter   Medication Sig Dispense Refill    aspirin delayed-release 81 mg tablet Take  by mouth daily.  levothyroxine (SYNTHROID) 50 mcg tablet Take  by mouth Daily (before breakfast).  metoprolol tartrate (LOPRESSOR) 50 mg tablet Take  by mouth two (2) times a day.  calcium carbonate 500 mg calcium (1,250 mg) tab 500 mg, ergocalciferol (vitamin d2) 400 unit tab 200 Units Take  by mouth daily.         pravastatin (PRAVACHOL) 40 mg tablet Take 40 mg by mouth nightly. Current Dietary Status:  NPO           · History of reflux:  NO  Social History/Home Situation:           OBJECTIVE:   Respiratory Status:  Nasal cannula  2 l/min  CXR Results:IMPRESSION: No acute abnormality. MRI Pending  CT Results:IMPRESSION:   1. No definite acute intracranial abnormality. Note, acute/subacute CVA cannot  be excluded secondary to the severity of the underlying white matter disease. 2. Severe chronic white matter microangiopathic disease with moderate bilateral  atrophy. Oral Motor Structure/Speech:  Oral-Motor Structure/Motor Speech  Labial: Decreased rate, Decreased seal, Right droop, Impaired coordination  Dentition: Upper & lower dentures  Oral Hygiene: adequate  Lingual: Decreased rate, Decreased strength, Incoordinated (would not follow commands to protrude)     Cognitive and Communication Status:  Neurologic State: Drowsy  Orientation Level: Oriented to person  Cognition: Decreased command following;Decreased attention/concentration  Perception: Cues to attend left visual field;Cues to attend right visual field  Perseveration: No perseveration noted  Safety/Judgement: Decreased insight into deficits; Decreased awareness of need for safety;Decreased awareness of need for assistance;Decreased awareness of environment     BEDSIDE SWALLOW EVALUATION  Oral Assessment:  Oral Assessment  Labial: Decreased rate;Decreased seal;Right droop; Impaired coordination  Dentition: Upper & lower dentures  Oral Hygiene: adequate  Lingual: Decreased rate;Decreased strength; Incoordinated (would not follow commands to protrude)  P.O. Trials:  Patient Position: upright in bed     The patient was given bite/sip amounts of the following:   Consistency Presented: Thin liquid;Puree; Ice chips  How Presented: SLP-fed/presented;Spoon     ORAL PHASE:  Bolus Acceptance: Absent  Bolus Formation/Control: Impaired  Propulsion: Absent  Type of Impairment: Delayed;Lip closure;Piecemeal;Incomplete  Oral Residue: Greater than 50% of bolus     PHARYNGEAL PHASE:  Initiation of Swallow: Delayed (# of seconds) (severely)  Laryngeal Elevation: Weak;Absent;Decreased  Aspiration Signs/Symptoms: Delayed cough/throat clear  Vocal Quality: Low volume     Effective Modifications: None     Pharyngeal Phase Characteristics: Suspected pharyngeal residue;Poor endurance;Easily fatigued      OTHER OBSERVATIONS:  Rate/bite size: Impaired   Endurance:  Impaired      Comments: not appropriate for further trials at this time      Tool Used: Dysphagia Outcome and Severity Scale (BLANCHE)     Score Comments   Normal Diet  [ ] 7 With no strategies or extra time needed   Functional Swallow  [ ] 6 May have mild oral or pharyngeal delay         Mild Dysphagia     [ ] 5 Which may require one diet consistency restricted (those who demonstrate penetration which is entirely cleared on MBS would be included)   Mild-Moderate Dysphagia  [ ] 4 With 1-2 diet consistencies restricted         Moderate Dysphagia  [ ] 3 With 2 or more diet consistencies restricted         Moderately Severe Dysphagia  [X] 2 With partial PO strategies (trials with ST only)         Severe Dysphagia  [ ] 1 With inability to tolerate any PO safely            Score:  Initial: 2 Most Recent: X (Date: -- )   Interpretation of Tool: The Dysphagia Outcome and Severity Scale (BLANCHE) is a simple, easy-to-use, 7-point scale developed to systematically rate the functional severity of dysphagia based on objective assessment and make recommendations for diet level, independence level, and type of nutrition.        Score 7 6 5 4 3 2 1   Modifier CH CI CJ CK CL CM CN   · Swallowing:               - CURRENT STATUS:           CM - 80%-99% impaired, limited or restricted               - GOAL STATUS:                   CJ - 20%-39% impaired, limited or restricted               - D/C STATUS: ---------------To be determined---------------  Payor: /       TREATMENT:         (In addition to Assessment/Re-Assessment sessions the following treatments were rendered)  Assessment/Reassessment only, no treatment provided today  MODALITIES:                                                                     ORAL MOTOR  EXERCISES:                                                                                                                                                                       LARYNGEAL / PHARYNGEAL EXERCISES:                                                                                                                                      __________________________________________________________________________________________________  Safety:   After treatment position/precautions:  · Call light within reach  · RN notified  · Upright in Bed  Treatment Assessment:  Patient required mod to max cues to participate in evaluation. Progression/Medical Necessity:   · Skilled intervention continues to be required due to persistent signs and symptoms of aspiration. Compliance with Program/Exercises: Will assess as treatment progresses. Reason for Continuation of Services/Other Comments:  · Patient continues to require skilled intervention due to medical complications and patient unable to attend/participate in therapy as expected. Recommendations/Intent for next treatment session: \"Treatment next visit will focus on PO trials with SLP\". Total Treatment Duration:  Time In: 1313  Time Out: [address].  MS Brian, CCC-SLP

## 2017-04-22 NOTE — Clinical Note
Status[de-identified] Inpatient [101] Type of Bed: Stepdown [17] Inpatient Hospitalization Certified Necessary for the Following Reasons: 4. Patient requires ICU level of care interventions (further clarification in H&P documentation) Admitting Diagnosis: TIA (transient ischemic attack) [303095] Admitting Diagnosis: Hypertensive urgency [[id number]] Admitting Physician: Shen Caldwell [[id number]] Attending Physician: Shen Caldwell [[id number]] Estimated Length of Stay: > or = to 2 Midnights Discharge Plan[de-identified] Home with Office Follow-up

## 2017-04-22 NOTE — ED PROVIDER NOTES
HPI Comments: Patient arrives from home via EMS with history of stroke symptoms. Paramedics reported patient had been seen about an hour prior to arrival normal for onset of symptoms however when the patient's daughter arrived she stated that she had last been seen normal yesterday afternoon. Onset of her symptoms is unknown. The patient was found on the department floor this morning by the daughter with right-sided facial droop and dysarthria and expressive aphasia. Also was noted to have weakness in the right upper and lower extremities. patient was noted to be incontinent of urine. Patient is a 80 y.o. female presenting with facial droop. The history is provided by the patient. Facial Droop   This is a new problem. Episode onset: unknown. The problem has not changed since onset. There was right facial, right upper extremity and right lower extremity focality noted. Primary symptoms include focal weakness, slurred speech and speech difficulty. Pertinent negatives include no loss of sensation, no loss of balance, no memory loss, no movement disorder, no agitation, no visual change, no auditory change, no mental status change, no unresponsiveness and no disorientation. There has been no fever. Associated symptoms include bladder incontinence. Pertinent negatives include no shortness of breath, no chest pain, no vomiting, no altered mental status, no confusion, no headaches, no choking, no nausea and no bowel incontinence. There were no medications administered prior to arrival.        Past Medical History:   Diagnosis Date    Anxiety 4/4/2017    --SEES DR. Diallo Dubon    Dementia 4/4/2017    --SEEN NEUROLOGIST - DR. MELENDEZ -     Dizziness 4/4/2017    Hyperlipidemia 4/4/2017    Hypertension 4/4/2017    Hypothyroidism 4/4/2017    Osteoarthritis 4/4/2017    PVD (peripheral vascular disease) ([facility] [address] 4/4/2017    --CAROTIDS       Past Surgical History:   Procedure Laterality Date    HX HYSTERECTOMY           No family history on file. Social History     Social History    Marital status:      Spouse name: N/A    Number of children: N/A    Years of education: N/A     Occupational History    Not on file. Social History Main Topics    Smoking status: Never Smoker    Smokeless tobacco: Not on file    Alcohol use Not on file    Drug use: Not on file    Sexual activity: Not on file     Other Topics Concern    Not on file     Social History Narrative    No narrative on file         ALLERGIES: Review of patient's allergies indicates no known allergies. Review of Systems   Respiratory: Negative for choking and shortness of breath. Cardiovascular: Negative for chest pain. Gastrointestinal: Negative for bowel incontinence, nausea and vomiting. Genitourinary: Positive for bladder incontinence. Neurological: Positive for focal weakness and speech difficulty. Negative for headaches and loss of balance. Psychiatric/Behavioral: Negative for agitation, confusion and memory loss. All other systems reviewed and are negative. Vitals:    04/22/17 0906   BP: (!) 233/105   Pulse: (!) 106   Resp: 24   Temp: 98.5 °F (36.9 °C)   SpO2: 97%   Weight: 45.4 kg (100 lb)   Height: 5' (1.524 m)            Physical Exam   Constitutional: She is oriented to person, place, and time. She appears well-developed and well-nourished. No distress. HENT:   Head: Normocephalic and atraumatic. Eyes: Conjunctivae and EOM are normal. Pupils are equal, round, and reactive to light. Neck: Normal range of motion. Neck supple. Cardiovascular: Normal rate, regular rhythm and normal heart sounds. Pulmonary/Chest: Effort normal and breath sounds normal.   Abdominal: Soft. Bowel sounds are normal.   Musculoskeletal: Normal range of motion. She exhibits no edema, tenderness or deformity. Neurological: She is alert and oriented to person, place, and time. She has normal reflexes. She displays normal reflexes.  A cranial nerve deficit is present. She exhibits abnormal muscle tone. Coordination abnormal.   Skin: Skin is warm and dry. Psychiatric: She has a normal mood and affect. Her behavior is normal.   Nursing note and vitals reviewed.        MDM  Number of Diagnoses or Management Options     Amount and/or Complexity of Data Reviewed  Clinical lab tests: ordered and reviewed  Tests in the radiology section of CPT®: ordered and reviewed  Independent visualization of images, tracings, or specimens: yes (EKG at 0 910 demonstrates sinus rhythm with no acute ischemic changes rate of 103)    Risk of Complications, Morbidity, and/or Mortality  Presenting problems: high  Diagnostic procedures: high  Management options: moderate    Patient Progress  Patient progress: stable    ED Course       Procedures

## 2017-04-23 ENCOUNTER — APPOINTMENT (OUTPATIENT)
Dept: MRI IMAGING | Age: 82
DRG: 064 | End: 2017-04-23
Attending: HOSPITALIST
Payer: MEDICARE

## 2017-04-23 LAB
ANION GAP BLD CALC-SCNC: 11 MMOL/L (ref 7–16)
APTT PPP: 38.6 SEC (ref 23.5–31.7)
APTT PPP: 57.8 SEC (ref 23.5–31.7)
APTT PPP: 84.2 SEC (ref 23.5–31.7)
BUN SERPL-MCNC: 33 MG/DL (ref 8–23)
CALCIUM SERPL-MCNC: 9.1 MG/DL (ref 8.3–10.4)
CHLORIDE SERPL-SCNC: 110 MMOL/L (ref 98–107)
CHOLEST SERPL-MCNC: 175 MG/DL
CK MB CFR SERPL CALC: 1.6 %
CK MB SERPL-MCNC: 16.8 NG/ML (ref 0.5–3.6)
CK SERPL-CCNC: 1072 U/L (ref 21–215)
CO2 SERPL-SCNC: 25 MMOL/L (ref 21–32)
CREAT SERPL-MCNC: 0.89 MG/DL (ref 0.6–1)
ERYTHROCYTE [DISTWIDTH] IN BLOOD BY AUTOMATED COUNT: 13.9 % (ref 11.9–14.6)
EST. AVERAGE GLUCOSE BLD GHB EST-MCNC: 117 MG/DL
GLUCOSE SERPL-MCNC: 142 MG/DL (ref 65–100)
HBA1C MFR BLD: 5.7 % (ref 4.8–6)
HCT VFR BLD AUTO: 47.1 % (ref 35.8–46.3)
HDLC SERPL-MCNC: 74 MG/DL (ref 40–60)
HDLC SERPL: 2.4 {RATIO}
HGB BLD-MCNC: 15.5 G/DL (ref 11.7–15.4)
LDLC SERPL CALC-MCNC: 86.4 MG/DL
LIPID PROFILE,FLP: ABNORMAL
MCH RBC QN AUTO: 29.4 PG (ref 26.1–32.9)
MCHC RBC AUTO-ENTMCNC: 32.9 G/DL (ref 31.4–35)
MCV RBC AUTO: 89.4 FL (ref 79.6–97.8)
PLATELET # BLD AUTO: 127 K/UL (ref 150–450)
PMV BLD AUTO: 14.4 FL (ref 10.8–14.1)
POTASSIUM SERPL-SCNC: 3.6 MMOL/L (ref 3.5–5.1)
RBC # BLD AUTO: 5.27 M/UL (ref 4.05–5.25)
SODIUM SERPL-SCNC: 146 MMOL/L (ref 136–145)
TRIGL SERPL-MCNC: 73 MG/DL (ref 35–150)
TROPONIN I SERPL-MCNC: 2.9 NG/ML (ref 0.02–0.05)
VLDLC SERPL CALC-MCNC: 14.6 MG/DL (ref 6–23)
WBC # BLD AUTO: 18.1 K/UL (ref 4.3–11.1)

## 2017-04-23 PROCEDURE — 92526 ORAL FUNCTION THERAPY: CPT

## 2017-04-23 PROCEDURE — 80048 BASIC METABOLIC PNL TOTAL CA: CPT | Performed by: INTERNAL MEDICINE

## 2017-04-23 PROCEDURE — 85027 COMPLETE CBC AUTOMATED: CPT | Performed by: INTERNAL MEDICINE

## 2017-04-23 PROCEDURE — 83036 HEMOGLOBIN GLYCOSYLATED A1C: CPT | Performed by: HOSPITALIST

## 2017-04-23 PROCEDURE — 70544 MR ANGIOGRAPHY HEAD W/O DYE: CPT

## 2017-04-23 PROCEDURE — 85730 THROMBOPLASTIN TIME PARTIAL: CPT | Performed by: INTERNAL MEDICINE

## 2017-04-23 PROCEDURE — 70551 MRI BRAIN STEM W/O DYE: CPT

## 2017-04-23 PROCEDURE — 74011000258 HC RX REV CODE- 258: Performed by: INTERNAL MEDICINE

## 2017-04-23 PROCEDURE — A9577 INJ MULTIHANCE: HCPCS | Performed by: HOSPITALIST

## 2017-04-23 PROCEDURE — 74011000258 HC RX REV CODE- 258: Performed by: HOSPITALIST

## 2017-04-23 PROCEDURE — 74011250636 HC RX REV CODE- 250/636: Performed by: INTERNAL MEDICINE

## 2017-04-23 PROCEDURE — 85730 THROMBOPLASTIN TIME PARTIAL: CPT | Performed by: HOSPITALIST

## 2017-04-23 PROCEDURE — 65660000000 HC RM CCU STEPDOWN

## 2017-04-23 PROCEDURE — 74011000250 HC RX REV CODE- 250: Performed by: INTERNAL MEDICINE

## 2017-04-23 PROCEDURE — 97166 OT EVAL MOD COMPLEX 45 MIN: CPT

## 2017-04-23 PROCEDURE — 74011250636 HC RX REV CODE- 250/636: Performed by: HOSPITALIST

## 2017-04-23 PROCEDURE — 74011250637 HC RX REV CODE- 250/637: Performed by: HOSPITALIST

## 2017-04-23 PROCEDURE — 84484 ASSAY OF TROPONIN QUANT: CPT | Performed by: INTERNAL MEDICINE

## 2017-04-23 PROCEDURE — 74011250637 HC RX REV CODE- 250/637: Performed by: INTERNAL MEDICINE

## 2017-04-23 PROCEDURE — 80061 LIPID PANEL: CPT | Performed by: HOSPITALIST

## 2017-04-23 PROCEDURE — 82550 ASSAY OF CK (CPK): CPT | Performed by: INTERNAL MEDICINE

## 2017-04-23 PROCEDURE — 36415 COLL VENOUS BLD VENIPUNCTURE: CPT | Performed by: HOSPITALIST

## 2017-04-23 PROCEDURE — 70548 MR ANGIOGRAPHY NECK W/DYE: CPT

## 2017-04-23 RX ORDER — ASPIRIN 325 MG
325 TABLET ORAL DAILY
Status: DISCONTINUED | OUTPATIENT
Start: 2017-04-23 | End: 2017-04-24

## 2017-04-23 RX ORDER — HYDRALAZINE HYDROCHLORIDE 20 MG/ML
10 INJECTION INTRAMUSCULAR; INTRAVENOUS ONCE
Status: COMPLETED | OUTPATIENT
Start: 2017-04-23 | End: 2017-04-23

## 2017-04-23 RX ORDER — LORAZEPAM 2 MG/ML
1 INJECTION INTRAMUSCULAR
Status: DISCONTINUED | OUTPATIENT
Start: 2017-04-23 | End: 2017-04-23

## 2017-04-23 RX ORDER — METOPROLOL TARTRATE 5 MG/5ML
5 INJECTION INTRAVENOUS
Status: DISCONTINUED | OUTPATIENT
Start: 2017-04-23 | End: 2017-05-01 | Stop reason: HOSPADM

## 2017-04-23 RX ORDER — SODIUM CHLORIDE 0.9 % (FLUSH) 0.9 %
10 SYRINGE (ML) INJECTION
Status: COMPLETED | OUTPATIENT
Start: 2017-04-23 | End: 2017-04-23

## 2017-04-23 RX ORDER — HEPARIN SODIUM 5000 [USP'U]/ML
1000 INJECTION, SOLUTION INTRAVENOUS; SUBCUTANEOUS ONCE
Status: COMPLETED | OUTPATIENT
Start: 2017-04-23 | End: 2017-04-23

## 2017-04-23 RX ORDER — FAMOTIDINE 20 MG/1
20 TABLET, FILM COATED ORAL EVERY 24 HOURS
Status: DISCONTINUED | OUTPATIENT
Start: 2017-04-23 | End: 2017-04-26

## 2017-04-23 RX ORDER — HYDRALAZINE HYDROCHLORIDE 20 MG/ML
10 INJECTION INTRAMUSCULAR; INTRAVENOUS
Status: DISCONTINUED | OUTPATIENT
Start: 2017-04-23 | End: 2017-04-24 | Stop reason: ALTCHOICE

## 2017-04-23 RX ORDER — LORAZEPAM 2 MG/ML
1 INJECTION INTRAMUSCULAR ONCE
Status: COMPLETED | OUTPATIENT
Start: 2017-04-23 | End: 2017-04-23

## 2017-04-23 RX ADMIN — ASPIRIN 325 MG ORAL TABLET 325 MG: 325 PILL ORAL at 14:48

## 2017-04-23 RX ADMIN — AMIODARONE HYDROCHLORIDE 0.5 MG/MIN: 50 INJECTION, SOLUTION INTRAVENOUS at 04:15

## 2017-04-23 RX ADMIN — SODIUM CHLORIDE 100 ML: 900 INJECTION, SOLUTION INTRAVENOUS at 11:59

## 2017-04-23 RX ADMIN — FAMOTIDINE 20 MG: 20 TABLET ORAL at 14:48

## 2017-04-23 RX ADMIN — PIPERACILLIN SODIUM,TAZOBACTAM SODIUM 3.38 G: 3; .375 INJECTION, POWDER, FOR SOLUTION INTRAVENOUS at 21:09

## 2017-04-23 RX ADMIN — METOPROLOL TARTRATE 50 MG: 50 TABLET ORAL at 09:41

## 2017-04-23 RX ADMIN — Medication 10 ML: at 11:59

## 2017-04-23 RX ADMIN — HYDRALAZINE HYDROCHLORIDE 10 MG: 20 INJECTION INTRAMUSCULAR; INTRAVENOUS at 04:50

## 2017-04-23 RX ADMIN — Medication 5 ML: at 21:10

## 2017-04-23 RX ADMIN — Medication 10 ML: at 04:25

## 2017-04-23 RX ADMIN — HEPARIN SODIUM 1000 UNITS: 5000 INJECTION, SOLUTION INTRAVENOUS; SUBCUTANEOUS at 15:28

## 2017-04-23 RX ADMIN — AMIODARONE HYDROCHLORIDE 0.5 MG/MIN: 50 INJECTION, SOLUTION INTRAVENOUS at 15:36

## 2017-04-23 RX ADMIN — Medication 10 ML: at 14:20

## 2017-04-23 RX ADMIN — PIPERACILLIN SODIUM,TAZOBACTAM SODIUM 3.38 G: 3; .375 INJECTION, POWDER, FOR SOLUTION INTRAVENOUS at 04:14

## 2017-04-23 RX ADMIN — LORAZEPAM 1 MG: 2 INJECTION INTRAMUSCULAR at 22:30

## 2017-04-23 RX ADMIN — LEVOTHYROXINE SODIUM 50 MCG: 50 TABLET ORAL at 09:41

## 2017-04-23 RX ADMIN — PIPERACILLIN SODIUM,TAZOBACTAM SODIUM 3.38 G: 3; .375 INJECTION, POWDER, FOR SOLUTION INTRAVENOUS at 14:20

## 2017-04-23 RX ADMIN — METOPROLOL TARTRATE 5 MG: 5 INJECTION INTRAVENOUS at 21:09

## 2017-04-23 RX ADMIN — HYDRALAZINE HYDROCHLORIDE 10 MG: 20 INJECTION INTRAMUSCULAR; INTRAVENOUS at 22:31

## 2017-04-23 RX ADMIN — AMIODARONE HYDROCHLORIDE 150 MG: 50 INJECTION, SOLUTION INTRAVENOUS at 06:28

## 2017-04-23 RX ADMIN — GADOBENATE DIMEGLUMINE 20 ML: 529 INJECTION, SOLUTION INTRAVENOUS at 11:59

## 2017-04-23 NOTE — PROGRESS NOTES
Pt now in AFib again. Dr. Cathy Duque notified and new orders received for 150 mg bolus and to increase gtt back to 1 mg/min.

## 2017-04-23 NOTE — PROGRESS NOTES
Patient received from ICU. Placed on telemetry monitor with BP cycling hourly. Pt is agitated and trying to pull at lines, Sarmiento, etc. Attempted to call daughter at number provided but call went to voicemail. Becoming combative with staff. Mitt placed on L hand and left loose. Unable to understand speech due to slurring of speech and language barrier. Unable to complete through neuro assessment due to language barrier. L arm moving purposefully and strong ; R arm flaccid. R sided facial droop, R eye does not open. Eyelids swollen. Bed alarm placed. Skin is intact. Sacrum with Allevyn in place but intact underneath.

## 2017-04-23 NOTE — DISCHARGE INSTRUCTIONS

## 2017-04-23 NOTE — PROGRESS NOTES
Verbal bedside report given to Luis Esteban RN. Heparin drip verified at 12 units/kg/hr and Cardizem at 12.5 mg/hr.

## 2017-04-23 NOTE — PROGRESS NOTES
TRANSFER - OUT REPORT:    Verbal report given to Petra MOREIRA(name) on Pen Eryn  being transferred to [facility](unit) for routine progression of care       Report consisted of patients Situation, Background, Assessment and   Recommendations(SBAR). Information from the following report(s) SBAR, Kardex, ED Summary, Intake/Output, MAR, Recent Results, Med Rec Status, Cardiac Rhythm NSR and Alarm Parameters  was reviewed with the receiving nurse. Lines:   Peripheral IV 04/22/17 Right Forearm (Active)   Site Assessment Clean, dry, & intact 4/23/2017  3:44 PM   Phlebitis Assessment 0 4/23/2017  3:44 PM   Infiltration Assessment 0 4/23/2017  3:44 PM   Dressing Status Clean, dry, & intact 4/23/2017  3:44 PM   Dressing Type Transparent;Tape 4/23/2017  7:29 AM   Hub Color/Line Status Pink;Patent 4/23/2017  7:29 AM   Alcohol Cap Used No 4/23/2017  7:29 AM       Peripheral IV 04/22/17 Left Forearm (Active)   Site Assessment Clean, dry, & intact 4/23/2017  3:44 PM   Phlebitis Assessment 0 4/23/2017  3:44 PM   Infiltration Assessment 0 4/23/2017  3:44 PM   Dressing Status Clean, dry, & intact 4/23/2017  3:44 PM   Dressing Type Transparent;Tape 4/23/2017  7:29 AM   Hub Color/Line Status Pink;Patent 4/23/2017  7:29 AM   Alcohol Cap Used No 4/23/2017  7:29 AM        Opportunity for questions and clarification was provided.       Patient transported with:   Monitor

## 2017-04-23 NOTE — PROGRESS NOTES
Problem: Self Care Deficits Care Plan (Adult)  Goal: *Acute Goals and Plan of Care (Insert Text)  1. Patient will complete upper body bathing and grooming with minimal assist and adaptive equipment as needed. 2. Patient will complete toileting with moderate assist.  3. Patient will tolerate 20 minutes of OT treatment with 1-2 rest breaks to increase activity tolerance for ADLs. 4. Patient will complete functional bed mobility with moderate assist and adaptive equipment as needed. 5. Patient will demonstrate sitting edge of bed with CGA to increase ADLS and precursor for transfers. Timeframe: 7 visits       OCCUPATIONAL THERAPY: Initial Assessment 4/23/2017  INPATIENT: Hospital Day: 2  Payor: /      NAME/AGE/GENDER: Ap Solis is a 80 y.o. female         PRIMARY DIAGNOSIS:  TIA (transient ischemic attack)  Hypertensive urgency  TIA (transient ischemic attack)  Hypertensive urgency <principal problem not specified> <principal problem not specified>        ICD-10: Treatment Diagnosis:        · Generalized Muscle Weakness (M62.81)  · Other lack of cordination (R27.8)   Precautions/Allergies:        FALLS Review of patient's allergies indicates no known allergies. ASSESSMENT:      Ms. Portia Solis presents with right sided weakness with flaccid right UE, right inattention, and visual deficits on right eye. Pt s/p MRI and found to have an acute left sided basal ganglia stroke. Pt with decreased mobility and ADLS and will likely require extensive therapy to address deficits. Pt transitioned with maximum assist to edge of bed and is maximum assist level for ADLS in the bed at this time. This section established at most recent assessment   PROBLEM LIST (Impairments causing functional limitations):  1. Decreased Strength  2. Decreased ADL/Functional Activities  3. Decreased Transfer Abilities  4. Decreased Ambulation Ability/Technique  5. Decreased Balance  6. Decreased Activity Tolerance  7.  Decreased Pacing Skills  8. Increased Fatigue  9. Decreased Flexibility/Joint Mobility  10. Decreased Cognition    INTERVENTIONS PLANNED: (Benefits and precautions of occupational therapy have been discussed with the patient.)  1. Activities of daily living training  2. Adaptive equipment training  3. Balance training  4. Clothing management  5. Cognitive training  6. Donning&doffing training  7. Hygiene training  8. Neuromuscular re-eduation  9. Theraputic activity  10. Theraputic exercise      TREATMENT PLAN: Frequency/Duration: Follow patient 3 x/ week to address above goals. Rehabilitation Potential For Stated Goals: FAIR      RECOMMENDED REHABILITATION/EQUIPMENT: (at time of discharge pending progress): Continue Skilled Therapy. OCCUPATIONAL PROFILE AND HISTORY:   History of Present Injury/Illness (Reason for Referral):  See H & P  Past Medical History/Comorbidities:   Ms. Radha Callejas  has a past medical history of Anxiety (4/4/2017); Dementia (4/4/2017); Dizziness (4/4/2017); Hyperlipidemia (4/4/2017); Hypertension (4/4/2017); Hypothyroidism (4/4/2017); Osteoarthritis (4/4/2017); and PVD (peripheral vascular disease) ([facility] [address] (4/4/2017). Ms. Radha Callejas  has a past surgical history that includes hysterectomy.   Social History/Living Environment:   Home Environment: Private residence  One/Two Story Residence: Two story  Living Alone: No  Support Systems: Child(keysha), Family member(s)  Patient Expects to be Discharged to[de-identified] Rehabilitation facility  Current DME Used/Available at Home: None  Prior Level of Function/Work/Activity:  No family present to assist with prior level of function history      Number of Personal Factors/Comorbidities that affect the Plan of Care: Expanded review of therapy/medical records (1-2):  MODERATE COMPLEXITY   ASSESSMENT OF OCCUPATIONAL PERFORMANCE[de-identified]   Activities of Daily Living:           Basic ADLs (From Assessment) Complex ADLs (From Assessment)   Basic ADL  Feeding: Maximum assistance, Additional time  Oral Facial Hygiene/Grooming: Maximum assistance, Additional time  Bathing: Maximum assistance, Additional time, Adaptive equipment  Upper Body Dressing: Maximum assistance, Additional time  Lower Body Dressing: Maximum assistance, Additional time  Toileting: Adaptive equipment, Maximum assistance, Additional time     Grooming/Bathing/Dressing Activities of Daily Living     Cognitive Retraining  Safety/Judgement: Decreased awareness of environment;Decreased awareness of need for assistance;Decreased awareness of need for safety;Decreased insight into deficits                       Bed/Mat Mobility  Supine to Sit: Maximum assistance  Sit to Supine: Maximum assistance  Scooting: Maximum assistance          Most Recent Physical Functioning:   Gross Assessment:                  Posture:  Posture (WDL): Exceptions to WDL  Posture Assessment: Cervical, Forward head  Balance:  Sitting: Impaired  Sitting - Static: Poor (constant support)  Sitting - Dynamic: Poor (constant support) Bed Mobility:  Supine to Sit: Maximum assistance  Sit to Supine: Maximum assistance  Scooting: Maximum assistance  Wheelchair Mobility:     Transfers:                    Patient Vitals for the past 6 hrs:       BP SpO2 Pulse   04/23/17 0959 161/58 95 % 87   04/23/17 1203 161/75 - -   04/23/17 1215 173/76 - 72   04/23/17 1315 174/73 98 % 77   04/23/17 1420 163/82 98 % 77   04/23/17 1455 (!) 157/101 99 % 75        Mental Status  Neurologic State: Eyes open to stimulus (right eye does not open,left eye opens)  Orientation Level: Oriented to person  Cognition: Decreased command following, Decreased attention/concentration  Perception: Cues to attend right visual field, Cues to attend to right side of body, Tactile  Perseveration: No perseveration noted  Safety/Judgement: Decreased awareness of environment, Decreased awareness of need for assistance, Decreased awareness of need for safety, Decreased insight into deficits Physical Skills Involved:  1. Range of Motion  2. Balance  3. Mobility  4. Strength  5. Endurance  6. Fine or Gross Motor Coordination  7. Sensation Cognitive Skills Affected (resulting in the inability to perform in a timely and safe manner):  1. Attending  2. Understanding  3. Problem Solving  4. Learning  5. Remembering Psychosocial Skills Affected:  1. Routines and Behaviors  2. Active Use of Coping Strategies  3. Environmental Adaptations   Number of elements that affect the Plan of Care: 3-5:  MODERATE COMPLEXITY   CLINICAL DECISION MAKIN[address] [place] [address] AM-PAC 6 Clicks   Basic Mobility Inpatient Short Form  How much help from another person does the patient currently need. .. Total A Lot A Little None   1. Putting on and taking off regular lower body clothing?   [ ] 1   [X] 2   [ ] 3   [ ] 4   2. Bathing (including washing, rinsing, drying)? [ ] 1   [X] 2   [ ] 3   [ ] 4   3. Toileting, which includes using toilet, bedpan or urinal?   [ ] 1   [X] 2   [ ] 3   [ ] 4   4. Putting on and taking off regular upper body clothing?   [ ] 1   [X] 2   [ ] 3   [ ] 4   5. Taking care of personal grooming such as brushing teeth? [ ] 1   [X] 2   [ ] 3   [ ] 4   6. Eating meals? [ ] 1   [X] 2   [ ] 3   [ ] 4   © , Trustees of [address] [place] [address], under license to [employer]. All rights reserved    Score:  Initial: 12, completed 17 Most Recent: X (Date: -- )     Interpretation of Tool:  Represents activities that are increasingly more difficult (i.e. Bed mobility, Transfers, Gait).        Score 24 23 22-20 19-15 14-10 9-7 6       Modifier CH CI CJ CK CL CM CN         · Self Care:               - CURRENT STATUS:    CL - 60%-79% impaired, limited or restricted               - GOAL STATUS:           CK - 40%-59% impaired, limited or restricted               - D/C STATUS:                       ---------------To be determined---------------  Payor: /       Medical Necessity:     · Patient is expected to demonstrate progress in strength, range of motion, balance, coordination and functional technique to decrease assistance required with mobility and increase independence with ADLS. Reason for Services/Other Comments:  · Patient continues to require skilled intervention due to pt in ICU; right UE flaccid, right inattention, maximum assist.   Use of outcome tool(s) and clinical judgement create a POC that gives a: MODERATE COMPLEXITY             TREATMENT:   (In addition to Assessment/Re-Assessment sessions the following treatments were rendered)      Pre-treatment Symptoms/Complaints:    Pain: Initial:   Pain Intensity 1: 0 does not indicate pain Post Session:  Does not indicate pain      Assessment/Reassessment only, no treatment provided today     Braces/Orthotics/Lines/Etc:   · IV  · mantilla catheter  · O2 Device: Room air  Treatment/Session Assessment:    · Response to Treatment:  Cooperative but quiet, decreased verbalizations  · Interdisciplinary Collaboration:  · Physical Therapist  · Occupational Therapist  · Registered Nurse  · After treatment position/precautions:  · Supine in bed  · Nurse at bedside  · Compliance with Program/Exercises: Will assess as treatment progresses. · Recommendations/Intent for next treatment session: \"Next visit will focus on advancements to more challenging activities and reduction in assistance provided\".   Total Treatment Duration:  OT Patient Time In/Time Out  Time In: 1500  Time Out: [address], OT

## 2017-04-23 NOTE — PROGRESS NOTES
Pt has been restless for several hours. Attempted reorientation, but due to the language barrier, pt does not appear to understand. Pt picking at lines and mantilla, mitts put on L hand. With agitation and restlessness, HR and BP increase. Neuro status remains the same. Will continue to monitor closely.

## 2017-04-23 NOTE — PROGRESS NOTES
MRI notified of pt's consent being completed and stat MRI orders. Per MRI, unable to complete pat and MD aware. Scheduled for the AM. Asked for pt's daughter to be present for translation purposes. Daughter notified to arrive between 0830 and 0900 for MRI.

## 2017-04-23 NOTE — PROGRESS NOTES
Progress Note    Patient: Hany Leiva MRN: [id number]  SSN: [id number]    YOB: 1935  Age: 80 y.o. Sex: female      Admit Date: 4/22/2017    LOS: 1 day     Subjective:     Seen at bedside. Admitted with Afib + RVR and R sided weakness. She is on an Amio drip and converted to NSR. MRI + MRA showed severe disease of the posterior circulation + left CVA of the basal ganglia. Objective:     Vitals:    04/23/17 0619 04/23/17 0621 04/23/17 0659 04/23/17 0729   BP:  139/47 161/69 181/65   Pulse: 91  98 86   Resp: 20  18 20   Temp:    98.9 °F (37.2 °C)   SpO2: 95%  95% 94%   Weight:       Height:            Intake and Output:  Current Shift:    Last three shifts: 04/21 1901 - 04/23 0700  In: 797.7 [I.V.:797.7]  Out: 425 [Urine:425]    Physical Exam:   GENERAL: drowsy   EYE: conjunctivae/corneas clear. PERRL, EOM's intact. Fundi benign  LYMPHATIC: Cervical, supraclavicular, and axillary nodes normal.   THROAT & NECK: normal and no erythema or exudates noted. LUNG: clear to auscultation bilaterally  HEART: regular rate and rhythm, S1, S2 normal, no murmur, click, rub or gallop  ABDOMEN: soft, non-tender. Bowel sounds normal. No masses,  no organomegaly  EXTREMITIES:  extremities normal, atraumatic, no cyanosis or edema  SKIN: Normal.  NEUROLOGIC: R sided paresis   PSYCHIATRIC: non focal    Lab/Data Review:  Lab results reviewed. For significant abnormal values and values requiring intervention, see assessment and plan. Assessment:     Active Problems:    TIA (transient ischemic attack) (4/22/2017)      Hypertensive urgency (4/22/2017)      Paroxysmal atrial fibrillation ([address] (4/22/2017)        Plan:     # continue amio drip as per cardiology recommendations   #cotinue heparin drip   #on ASA   #Seen by speech therapy, allowed to have modified diet   #On lopressor BID.  Allowing her to have some permissive hypertension   #MRI showed acute CVA of the left basal ganglia   #MRA showed severe atherosclerosis of the posterior circulation     Signed By: Rossana Figueroa MD     April 23, 2017

## 2017-04-23 NOTE — PROGRESS NOTES
Verbal and bedside shift change report received from [place], [facility].  Amio and Heparin gtt verified at bedside

## 2017-04-23 NOTE — PROGRESS NOTES
Discussed ptt with Dr Ronald Mclean, orders received. Spoke with Dr Corbin Contreras about infusion paused for about 30 mins while in MRI and transport. Orders received for bolus dose and keep rate the same, recheck ptt in 6 hours.

## 2017-04-23 NOTE — PROGRESS NOTES
Problem: Dysphagia (Adult)  Goal: *Acute Goals and Plan of Care (Insert Text)  ST. Pt. Will tolerate PO trials of diet upgrades with SLP at bedside with no overt s/sx of aspiration/penetration. Goal updated 2017   2. Patient will complete bedside swallow evaluation with 100% participation. 3. Pt. Will participate in speech/language/cognitive evaluation with 100% participation. 4. Patient will tolerate puree/honey thick liquids/no straws with no overt s/sx of aspiration/penetration. Goal added 2017    LTG: Pt. Will tolerate least restrictive diet without respiratory decline. SPEECH LANGUAGE PATHOLOGY: BEDSIDE SWALLOW NOTE: Daily Note 1     NAME/AGE/GENDER: Ap Spence is a 80 y.o. female  DATE: 2017  PRIMARY DIAGNOSIS: TIA (transient ischemic attack)  Hypertensive urgency  TIA (transient ischemic attack)  Hypertensive urgency       ICD-10: Treatment Diagnosis: R13.12 Dysphagia, Oropharyngeal Phase     INTERDISCIPLINARY COLLABORATION: Registered Nurse  PRECAUTIONS/ALLERGIES: Review of patient's allergies indicates no known allergies. ASSESSMENT:   Based on the objective data described below, Ms. Herbert Spence presents with improved alertness/bolus acceptance/participation this date. Patient opens left eye to verbal stimulus and minimal right eye opening noted as well. Improved response to y/n questions and simple oral motor directions. Did have episodes of distorted fluent speech, however it is difficult to decipher whether patient is speaking native language versus [place]. Intermittent phrases noted in English this date, moderately dysarthric. No swallow initiated with ice chip. Improved acceptance of tsp noted. Patient with delayed strong coughing with tsp thin liquids. Tolerated tsp nectar thick liquids, however delayed strong cough with cup sip nectar thick liquids. No overt s/sx with honey thick liquids or applesauce. Unable to draw through straw given oral motor weakness.  Notably delayed swallow with all consistencies. Minimal residue noted with puree this date. Patient then stating \"no more. \" Recommend puree/honey thick liquids/no straws. 1:1 assistance, feed only when alert/participative, check for pocketing on right - oral care post intake. Medication crushed in puree (does not appear to like applesauce). Patient will benefit from skilled intervention to address the below impairments. ?????? ? ? This section established at most recent assessment??????????  PROBLEM LIST (Impairments causing functional limitations):  1. dysphagia  REHABILITATION POTENTIAL FOR STATED GOALS: FAIR      PLAN OF CARE:   Patient will benefit from skilled intervention to address the following impairments. RECOMMENDATIONS AND PLANNED INTERVENTIONS (Benefits and precautions of therapy have been discussed with the patient.):  · PO:  Pureed and Liquids:  honey, no straws  MEDICATIONS:  · Crushed in puree  COMPENSATORY STRATEGIES/MODIFICATIONS INCLUDING:  · Alternation of bites and sips  · Check for pocketing  OTHER RECOMMENDATIONS (including follow up treatment recommendations): · Family training/education  · Patient education  RECOMMENDED DIET MODIFICATIONS DISCUSSED WITH:  · Nursing  · Patient  FREQUENCY/DURATION: Continue to follow patient 2 times a week as able or until goals met. RECOMMENDED REHABILITATION/EQUIPMENT: (at time of discharge pending progress):   Continue Skilled Therapy and Rehab. SUBJECTIVE:   Noted increased command following and increased attempts to communicate. Able to nod/verbally answer basic y/n questions appropriately. History of Present Injury/Illness: Ms. Mya Davies  has a past medical history of Anxiety (4/4/2017); Dementia (4/4/2017); Dizziness (4/4/2017); Hyperlipidemia (4/4/2017); Hypertension (4/4/2017); Hypothyroidism (4/4/2017); Osteoarthritis (4/4/2017); and PVD (peripheral vascular disease) ([facility] [address] (4/4/2017). .  She also  has a past surgical history that includes hysterectomy. Present Symptoms:    Pain Intensity 1: 0  Current Medications:   No current facility-administered medications on file prior to encounter. Current Outpatient Prescriptions on File Prior to Encounter   Medication Sig Dispense Refill    aspirin delayed-release 81 mg tablet Take  by mouth daily.  levothyroxine (SYNTHROID) 50 mcg tablet Take  by mouth Daily (before breakfast).  metoprolol tartrate (LOPRESSOR) 50 mg tablet Take  by mouth two (2) times a day.  calcium carbonate 500 mg calcium (1,250 mg) tab 500 mg, ergocalciferol (vitamin d2) 400 unit tab 200 Units Take  by mouth daily.  pravastatin (PRAVACHOL) 40 mg tablet Take 40 mg by mouth nightly. Current Dietary Status:  NPO           · History of reflux:  NO  Social History/Home Situation:    Home Environment: Private residence  One/Two Story Residence: Two story  Living Alone: No  Support Systems: Child(keysha), Family member(s)  Patient Expects to be Discharged to[de-identified] Rehabilitation facility  Current DME Used/Available at Home: None      OBJECTIVE:   Respiratory Status:  Room air     CXR Results:IMPRESSION: No acute abnormality. MRI Pending  CT Results:IMPRESSION:   1. No definite acute intracranial abnormality. Note, acute/subacute CVA cannot  be excluded secondary to the severity of the underlying white matter disease. 2. Severe chronic white matter microangiopathic disease with moderate bilateral  atrophy.   Oral Motor Structure/Speech:  Oral-Motor Structure/Motor Speech  Labial: Decreased rate, Decreased seal, Right droop, Impaired coordination  Dentition: Edentulous (dentures taken home by daughter per notes)  Oral Hygiene: adequate  Lingual: Decreased rate, Decreased strength (difficulty with protrusion, attempt noted)     Cognitive and Communication Status:  Neurologic State: Eyes open to stimulus  Orientation Level: Oriented to person  Cognition: Decreased command following  Perception: Cues to attend right visual field;Cues to attend to right side of body  Perseveration: No perseveration noted  Safety/Judgement: Decreased insight into deficits; Decreased awareness of need for safety;Decreased awareness of need for assistance;Decreased awareness of environment     BEDSIDE SWALLOW EVALUATION  Oral Assessment:  Oral Assessment  Labial: Decreased rate;Decreased seal;Right droop; Impaired coordination  Dentition: Edentulous (dentures taken home by daughter per notes)  Oral Hygiene: adequate  Lingual: Decreased rate;Decreased strength (difficulty with protrusion, attempt noted)  P.O. Trials:  Patient Position: upright in bed     The patient was given bite/sip amounts of the following:   Consistency Presented: Thin liquid; Ice chips; Nectar thick liquid;Honey thick liquid;Puree  How Presented: SLP-fed/presented;Cup/sip;Spoon;Straw     ORAL PHASE:  Bolus Acceptance: Impaired  Bolus Formation/Control: Impaired  Propulsion: Discoordination;Delayed (# of seconds)  Type of Impairment: Delayed; Anterior;Piecemeal  Oral Residue: Less than 10% of bolus     PHARYNGEAL PHASE:  Initiation of Swallow: Delayed (# of seconds)  Laryngeal Elevation: Decreased  Aspiration Signs/Symptoms: Delayed cough/throat clear  Vocal Quality: Low volume           Pharyngeal Phase Characteristics: Poor endurance;Easily fatigued ; Double swallow     OTHER OBSERVATIONS:  Rate/bite size: Impaired   Endurance:  Impaired      Comments: 1:1 assist     Tool Used: Dysphagia Outcome and Severity Scale (BLANCHE)     Score Comments   Normal Diet  [ ] 7 With no strategies or extra time needed   Functional Swallow  [ ] 6 May have mild oral or pharyngeal delay         Mild Dysphagia     [ ] 5 Which may require one diet consistency restricted (those who demonstrate penetration which is entirely cleared on MBS would be included)   Mild-Moderate Dysphagia  [ ] 4 With 1-2 diet consistencies restricted         Moderate Dysphagia  [ ] 3 With 2 or more diet consistencies restricted         Moderately Severe Dysphagia  [X] 2 With partial PO strategies (trials with ST only)         Severe Dysphagia  [ ] 1 With inability to tolerate any PO safely            Score:  Initial: 2 Most Recent: X (Date: -- )   Interpretation of Tool: The Dysphagia Outcome and Severity Scale (BLANCHE) is a simple, easy-to-use, 7-point scale developed to systematically rate the functional severity of dysphagia based on objective assessment and make recommendations for diet level, independence level, and type of nutrition. Score 7 6 5 4 3 2 1   Modifier CH CI CJ CK CL CM CN   · Swallowing:               - CURRENT STATUS:           CM - 80%-99% impaired, limited or restricted               - GOAL STATUS:                   CJ - 20%-39% impaired, limited or restricted               - D/C STATUS:                       ---------------To be determined---------------  Payor: /       TREATMENT:         (In addition to Assessment/Re-Assessment sessions the following treatments were rendered)  Dysphagia Activities: Activities/Procedures listed utilized to improve progress in swallow strength, swallow timeliness, swallow function, diet tolerance and swallow safety. Required moderate cueing to improve swallow safety, work toward diet advancement and decrease aspiration risk.   MODALITIES:                                                                     ORAL MOTOR  EXERCISES:                                                                                                                                                                       LARYNGEAL / PHARYNGEAL EXERCISES:                                                                                                                                      __________________________________________________________________________________________________  Safety:   After treatment position/precautions:  · Call light within reach  · RN notified  · Upright in Bed  Treatment Assessment: Patient required mod cues to participate in PO trials. Progression/Medical Necessity:   · Skilled intervention continues to be required due to persistent signs and symptoms of aspiration. Compliance with Program/Exercises: Will assess as treatment progresses. Reason for Continuation of Services/Other Comments:  · Patient continues to require skilled intervention due to medical complications and patient unable to attend/participate in therapy as expected. Recommendations/Intent for next treatment session: \"Treatment next visit will focus on diet tolerance, trial upgrades\". Total Treatment Duration:  Time In: 7202  Time Out: Wilfr[address] 83.  MS Brian, CCC-SLP

## 2017-04-23 NOTE — PROGRESS NOTES
Bedside shift report received from Gloria, [facility]. Dual neuro assessment performed. Pt drowsy, but opens eyes to voice. R facial droop and R drooping eyelid. RUE and RLE flaccid, but both withdraw to pain. LUE and LLE with spontaneous and purposeful, weak movements. PERRL. Follows simple commands. Pt speaks minimal english, daughter at bedside to assist. Oriented to person. Afebrile, NSR, BP slightly hypertensive. Lines:  Peripheral IV x3    Drips:  Heparin @ 12 units/kg/hr  Cardizem @ 12.5 mg/hr    Skin assessment performed. Rash noted to lower abdomen, present on admission and improved. Sacrum intact and allevyn in place. Scattered scarring on BLE. Will continue to monitor closely.

## 2017-04-23 NOTE — PROGRESS NOTES
TRANSFER - IN REPORT:    Verbal report received from Radha Fierro RN on Pen Degroft being received from ICU for routine progression of care      Report consisted of patients Situation, Background, Assessment and Recommendations(SBAR). Information from the following report(s) SBAR, Kardex, MAR, Med Rec Status and Cardiac Rhythm SR was reviewed with the receiving nurse. Opportunity for questions and clarification was provided. Assessment completed upon patients arrival to unit and care assumed.

## 2017-04-23 NOTE — PROGRESS NOTES
Hospitalist MD notified of pt's HTN with SBP 170s-180s. Pt unable to take PO medications. Orders received for 10 mg hydralazine once.

## 2017-04-23 NOTE — PROGRESS NOTES
[facility] CARDIOLOGY PROGRESS NOTE           4/23/2017 9:56 AM    Admit Date: 4/22/2017      Subjective:   Patient remains somnolent and had transient recurrent AF/RVR last night. ROS:  Cardiovascular:  As noted above    Objective:      Vitals:    04/23/17 0619 04/23/17 0621 04/23/17 0659 04/23/17 0729   BP:  139/47 161/69 181/65   Pulse: 91  98 86   Resp: 20  18 20   Temp:    98.9 °F (37.2 °C)   SpO2: 95%  95% 94%   Weight:       Height:           Physical Exam:  General-No Acute Distress  Neck- supple, no JVD  CV- regular rate and rhythm no MRG  Lung- clear bilaterally  Abd- soft, nontender, nondistended  Ext- no edema bilaterally. Skin- warm and dry    Data Review:   Recent Labs      04/23/17   0415  04/22/17   1344  04/22/17   0905   NA   --    --   144   K   --    --   4.1   BUN   --    --   27*   CREA   --    --   0.94   GLU   --    --   110*   WBC   --   11.9*  11.2*   HGB   --   17.7*  17.3*   HCT   --   53.2*  52.4*   PLT   --   137*  141*   INR   --    --   1.1   TROIQ   --    --   0.22*   CHOL  175   --    --    TGL  73   --    --    LDLC  86.4   --    --    HDL  74*   --    --        Assessment/Plan:     Active Problems:    TIA (transient ischemic attack) (4/22/2017) - MRI pending      Hypertensive urgency (4/22/2017) - This has all resolved. Paroxysmal atrial fibrillation (HCC) (4/22/2017) - In SR on amiodarone and heparin. When stable and no additional procedures plan would transient to Eliquis or Xarelto.             Odalys Johnson MD  4/23/2017 9:56 AM

## 2017-04-23 NOTE — PROGRESS NOTES
Patient ate a few bites for lunch. Tolerated swallowing fine, did have to cue the patient to swallow. Education given to daughter and how to feed the patient.

## 2017-04-24 ENCOUNTER — APPOINTMENT (OUTPATIENT)
Dept: GENERAL RADIOLOGY | Age: 82
DRG: 064 | End: 2017-04-24
Attending: FAMILY MEDICINE
Payer: MEDICARE

## 2017-04-24 PROBLEM — R77.8 ELEVATED TROPONIN: Status: ACTIVE | Noted: 2017-04-24

## 2017-04-24 PROBLEM — I63.20 CEREBROVASCULAR ACCIDENT (CVA) DUE TO OCCLUSION OF PRECEREBRAL ARTERY (HCC): Status: ACTIVE | Noted: 2017-04-24

## 2017-04-24 PROBLEM — G45.9 TIA (TRANSIENT ISCHEMIC ATTACK): Status: RESOLVED | Noted: 2017-04-22 | Resolved: 2017-04-24

## 2017-04-24 LAB
ANION GAP BLD CALC-SCNC: 11 MMOL/L (ref 7–16)
APTT PPP: 43 SEC (ref 23.5–31.7)
APTT PPP: 57.8 SEC (ref 23.5–31.7)
APTT PPP: 79.5 SEC (ref 23.5–31.7)
BUN SERPL-MCNC: 31 MG/DL (ref 8–23)
CALCIUM SERPL-MCNC: 8.5 MG/DL (ref 8.3–10.4)
CHLORIDE SERPL-SCNC: 111 MMOL/L (ref 98–107)
CO2 SERPL-SCNC: 25 MMOL/L (ref 21–32)
CREAT SERPL-MCNC: 0.85 MG/DL (ref 0.6–1)
ERYTHROCYTE [DISTWIDTH] IN BLOOD BY AUTOMATED COUNT: 14.2 % (ref 11.9–14.6)
GLUCOSE SERPL-MCNC: 100 MG/DL (ref 65–100)
HCT VFR BLD AUTO: 46.8 % (ref 35.8–46.3)
HGB BLD-MCNC: 15.9 G/DL (ref 11.7–15.4)
MCH RBC QN AUTO: 29.8 PG (ref 26.1–32.9)
MCHC RBC AUTO-ENTMCNC: 34 G/DL (ref 31.4–35)
MCV RBC AUTO: 87.6 FL (ref 79.6–97.8)
PLATELET # BLD AUTO: 145 K/UL (ref 150–450)
PMV BLD AUTO: 13.9 FL (ref 10.8–14.1)
POTASSIUM SERPL-SCNC: 3.6 MMOL/L (ref 3.5–5.1)
RBC # BLD AUTO: 5.34 M/UL (ref 4.05–5.25)
SODIUM SERPL-SCNC: 147 MMOL/L (ref 136–145)
WBC # BLD AUTO: 18.7 K/UL (ref 4.3–11.1)

## 2017-04-24 PROCEDURE — 80048 BASIC METABOLIC PNL TOTAL CA: CPT | Performed by: INTERNAL MEDICINE

## 2017-04-24 PROCEDURE — 74011250636 HC RX REV CODE- 250/636: Performed by: HOSPITALIST

## 2017-04-24 PROCEDURE — 74011000258 HC RX REV CODE- 258: Performed by: HOSPITALIST

## 2017-04-24 PROCEDURE — 74011000250 HC RX REV CODE- 250: Performed by: INTERNAL MEDICINE

## 2017-04-24 PROCEDURE — 74011250637 HC RX REV CODE- 250/637: Performed by: INTERNAL MEDICINE

## 2017-04-24 PROCEDURE — 85730 THROMBOPLASTIN TIME PARTIAL: CPT | Performed by: HOSPITALIST

## 2017-04-24 PROCEDURE — 74000 XR ABD (KUB): CPT

## 2017-04-24 PROCEDURE — 85730 THROMBOPLASTIN TIME PARTIAL: CPT | Performed by: INTERNAL MEDICINE

## 2017-04-24 PROCEDURE — 74011250636 HC RX REV CODE- 250/636: Performed by: FAMILY MEDICINE

## 2017-04-24 PROCEDURE — 74011250636 HC RX REV CODE- 250/636: Performed by: INTERNAL MEDICINE

## 2017-04-24 PROCEDURE — 92526 ORAL FUNCTION THERAPY: CPT

## 2017-04-24 PROCEDURE — 77030008771 HC TU NG SALEM SUMP -A

## 2017-04-24 PROCEDURE — 87493 C DIFF AMPLIFIED PROBE: CPT | Performed by: FAMILY MEDICINE

## 2017-04-24 PROCEDURE — 85027 COMPLETE CBC AUTOMATED: CPT | Performed by: INTERNAL MEDICINE

## 2017-04-24 PROCEDURE — 65660000000 HC RM CCU STEPDOWN

## 2017-04-24 PROCEDURE — 74011250637 HC RX REV CODE- 250/637: Performed by: PHYSICIAN ASSISTANT

## 2017-04-24 PROCEDURE — 74011250637 HC RX REV CODE- 250/637: Performed by: HOSPITALIST

## 2017-04-24 PROCEDURE — 36415 COLL VENOUS BLD VENIPUNCTURE: CPT | Performed by: INTERNAL MEDICINE

## 2017-04-24 PROCEDURE — 74011250637 HC RX REV CODE- 250/637: Performed by: FAMILY MEDICINE

## 2017-04-24 PROCEDURE — 77030018798 HC PMP KT ENTRL FED COVD -A

## 2017-04-24 PROCEDURE — 0DH67UZ INSERTION OF FEEDING DEVICE INTO STOMACH, VIA NATURAL OR ARTIFICIAL OPENING: ICD-10-PCS | Performed by: INTERNAL MEDICINE

## 2017-04-24 RX ORDER — AMIODARONE HYDROCHLORIDE 200 MG/1
400 TABLET ORAL 2 TIMES DAILY
Status: DISCONTINUED | OUTPATIENT
Start: 2017-04-24 | End: 2017-05-01 | Stop reason: HOSPADM

## 2017-04-24 RX ORDER — LISINOPRIL 5 MG/1
10 TABLET ORAL ONCE
Status: COMPLETED | OUTPATIENT
Start: 2017-04-24 | End: 2017-04-24

## 2017-04-24 RX ORDER — HEPARIN SODIUM 5000 [USP'U]/ML
35 INJECTION, SOLUTION INTRAVENOUS; SUBCUTANEOUS ONCE
Status: COMPLETED | OUTPATIENT
Start: 2017-04-24 | End: 2017-04-24

## 2017-04-24 RX ORDER — GUAIFENESIN 100 MG/5ML
81 LIQUID (ML) ORAL DAILY
Status: DISCONTINUED | OUTPATIENT
Start: 2017-04-24 | End: 2017-04-24

## 2017-04-24 RX ORDER — GUAIFENESIN 100 MG/5ML
81 LIQUID (ML) ORAL DAILY
Status: DISCONTINUED | OUTPATIENT
Start: 2017-04-25 | End: 2017-05-01 | Stop reason: HOSPADM

## 2017-04-24 RX ORDER — LISINOPRIL 5 MG/1
10 TABLET ORAL DAILY
Status: DISCONTINUED | OUTPATIENT
Start: 2017-04-24 | End: 2017-04-26

## 2017-04-24 RX ORDER — LORAZEPAM 2 MG/ML
1 INJECTION INTRAMUSCULAR
Status: DISCONTINUED | OUTPATIENT
Start: 2017-04-24 | End: 2017-05-01 | Stop reason: HOSPADM

## 2017-04-24 RX ORDER — METOPROLOL TARTRATE 50 MG/1
50 TABLET ORAL 2 TIMES DAILY
Status: DISCONTINUED | OUTPATIENT
Start: 2017-04-24 | End: 2017-05-01 | Stop reason: HOSPADM

## 2017-04-24 RX ORDER — AMLODIPINE BESYLATE 5 MG/1
5 TABLET ORAL
Status: DISCONTINUED | OUTPATIENT
Start: 2017-04-24 | End: 2017-04-26

## 2017-04-24 RX ORDER — HYDRALAZINE HYDROCHLORIDE 20 MG/ML
20 INJECTION INTRAMUSCULAR; INTRAVENOUS
Status: DISCONTINUED | OUTPATIENT
Start: 2017-04-24 | End: 2017-05-01 | Stop reason: HOSPADM

## 2017-04-24 RX ADMIN — METOPROLOL TARTRATE 5 MG: 5 INJECTION INTRAVENOUS at 15:51

## 2017-04-24 RX ADMIN — HYDRALAZINE HYDROCHLORIDE 10 MG: 20 INJECTION INTRAMUSCULAR; INTRAVENOUS at 12:15

## 2017-04-24 RX ADMIN — AMLODIPINE BESYLATE 5 MG: 5 TABLET ORAL at 23:20

## 2017-04-24 RX ADMIN — PIPERACILLIN SODIUM,TAZOBACTAM SODIUM 3.38 G: 3; .375 INJECTION, POWDER, FOR SOLUTION INTRAVENOUS at 12:15

## 2017-04-24 RX ADMIN — HEPARIN SODIUM AND DEXTROSE 11 UNITS/KG/HR: 5000; 5 INJECTION INTRAVENOUS at 06:16

## 2017-04-24 RX ADMIN — HYDRALAZINE HYDROCHLORIDE 10 MG: 20 INJECTION INTRAMUSCULAR; INTRAVENOUS at 18:23

## 2017-04-24 RX ADMIN — METOPROLOL TARTRATE 50 MG: 50 TABLET ORAL at 16:37

## 2017-04-24 RX ADMIN — AMIODARONE HYDROCHLORIDE 400 MG: 200 TABLET ORAL at 17:44

## 2017-04-24 RX ADMIN — AMIODARONE HYDROCHLORIDE 0.5 MG/MIN: 50 INJECTION, SOLUTION INTRAVENOUS at 07:02

## 2017-04-24 RX ADMIN — PIPERACILLIN SODIUM,TAZOBACTAM SODIUM 3.38 G: 3; .375 INJECTION, POWDER, FOR SOLUTION INTRAVENOUS at 05:29

## 2017-04-24 RX ADMIN — PRAVASTATIN SODIUM 40 MG: 20 TABLET ORAL at 21:53

## 2017-04-24 RX ADMIN — HYDRALAZINE HYDROCHLORIDE 20 MG: 20 INJECTION INTRAMUSCULAR; INTRAVENOUS at 23:21

## 2017-04-24 RX ADMIN — METOPROLOL TARTRATE 5 MG: 5 INJECTION INTRAVENOUS at 21:53

## 2017-04-24 RX ADMIN — HEPARIN SODIUM AND DEXTROSE 11 UNITS/KG/HR: 5000; 5 INJECTION INTRAVENOUS at 18:47

## 2017-04-24 RX ADMIN — PIPERACILLIN SODIUM,TAZOBACTAM SODIUM 3.38 G: 3; .375 INJECTION, POWDER, FOR SOLUTION INTRAVENOUS at 21:54

## 2017-04-24 RX ADMIN — FAMOTIDINE 20 MG: 20 TABLET ORAL at 16:37

## 2017-04-24 RX ADMIN — HEPARIN SODIUM 1800 UNITS: 5000 INJECTION, SOLUTION INTRAVENOUS; SUBCUTANEOUS at 06:12

## 2017-04-24 RX ADMIN — Medication 10 ML: at 23:22

## 2017-04-24 RX ADMIN — Medication 5 ML: at 05:30

## 2017-04-24 RX ADMIN — Medication 5 ML: at 12:16

## 2017-04-24 RX ADMIN — HYDRALAZINE HYDROCHLORIDE 10 MG: 20 INJECTION INTRAMUSCULAR; INTRAVENOUS at 05:29

## 2017-04-24 RX ADMIN — LISINOPRIL 10 MG: 5 TABLET ORAL at 23:19

## 2017-04-24 NOTE — PROGRESS NOTES
Care Management Interventions  Transition of Care Consult (CM Consult): Discharge Planning  Physical Therapy Consult: Yes  Occupational Therapy Consult: Yes  Speech Therapy Consult: Yes  Current Support Network: Relative's Home (Pt resides with and care is supported by her daughter.)  Confirm Follow Up Transport: Family  Plan discussed with Pt/Family/Caregiver: Yes  Freedom of Choice Offered: Yes  Discharge Location  Discharge Placement:  (LMSW met with pt and daughter at bedside.   Will follow plan of care and assist as rehab and supportive care needs are know.)

## 2017-04-24 NOTE — PROGRESS NOTES
Verbal and bedside shift change report received from Chavo Maldonado RN. Heparin gtt verified at bedside.  NG tube verified at bedside

## 2017-04-24 NOTE — PROGRESS NOTES
Spoke with Chapis Rodriguez from patient relations interpretors, Interpretor not available for patient's specific dialect. Awaiting call back from Chapis Rodriguez regarding availability of interpretor. Primary RN notified.

## 2017-04-24 NOTE — PROGRESS NOTES
Problem: Falls - Risk of  Goal: *Absence of falls  Outcome: Progressing Towards Goal  Bed alarm set; gripper socks on; call light in reach; bed low and locked

## 2017-04-24 NOTE — PROGRESS NOTES
[facility] CARDIOLOGY PROGRESS NOTE           4/24/2017 8:33 AM    Admit Date: 4/22/2017      Subjective:   Patient somnolent this AM.  Difficult to get history. BP mildly elevated. In sinus rhythm on IV amiodarone. On IV heparin. ROS:  Cardiovascular:  As noted above    Objective:      Vitals:    04/24/17 0553 04/24/17 0557 04/24/17 0623 04/24/17 0720   BP: 168/60 158/55  141/63   Pulse:  85  82   Resp:  18  18   Temp:  97.5 °F (36.4 °C)  96.5 °F (35.8 °C)   SpO2:  98%  95%   Weight:   50.2 kg (110 lb 11.2 oz)    Height:           Physical Exam:  General-No Acute Distress  Neck- supple, no JVD  CV- regular rate and rhythm no MRG  Lung- clear bilaterally  Abd- soft, nontender, nondistended  Ext- no edema bilaterally. Skin- warm and dry      Data Review:   Recent Labs      04/24/17   0508  04/23/17   1244  04/23/17   0415   04/22/17   0905   NA  147*  146*   --    --   144   K  3.6  3.6   --    --   4.1   BUN  31*  33*   --    --   27*   CREA  0.85  0.89   --    --   0.94   GLU  100  142*   --    --   110*   WBC  18.7*  18.1*   --    < >  11.2*   HGB  15.9*  15.5*   --    < >  17.3*   HCT  46.8*  47.1*   --    < >  52.4*   PLT  145*  127*   --    < >  141*   INR   --    --    --    --   1.1   HDL   --    --   74*   --    --     < > = values in this interval not displayed. Lab Results   Component Value Date/Time    TROIQ 2.90 ([facility]) 04/23/2017 12:44 PM   Echo (4/22/17):  -  Left ventricle: Systolic function was hyperdynamic. Ejection fraction was estimated in the range of 70 % to 75 %. There were no regional wall motion abnormalities. -  Left atrium: The atrium was moderately dilated. -  Atrial septum: Contrast injection was performed. There was no right-to-left shunt, with provocative maneuvers to increase right atrial pressure. -  Right atrium: The atrium was mildly dilated.     Assessment/Plan:     Principal Problem:    Cerebrovascular accident (CVA) due to occlusion of precerebral artery ([facility] [address] (4/24/2017)  Paroxysmal atrial fibrillation. Needs to change IV heparin to Eliquis 5 mg BID once stable from neurologic perspective in regards to hemorragic transformation of infarct. Active Problems:    Hypertensive urgency (4/22/2017)  BP elevated. Add lisinopril. Paroxysmal atrial fibrillation (HCC) (4/22/2017)  Change IV amiodarone to PO. Change to Eliquis as above. Elevated troponin (4/24/2017)  Demand ischemia. Normal EF. No plans for ischemia evaluation. On ASA and statin.                  Zoya Carbone MD  4/24/2017 8:33 AM

## 2017-04-24 NOTE — PROGRESS NOTES
Verbal bedside report given to Laisha Land, oncoming RN. Patient's situation, background, assessment and recommendations provided. Opportunity for questions provided. Oncoming RN assumed care of patient. Heparin and Amio IV drip verified at bedside with oncoming RN.

## 2017-04-24 NOTE — PROGRESS NOTES
NGT inserted per Dr. Verónica Cedeno order. Patient tolerated well with minimal signs of discomfort. Placement verified. Attempted to call patient's daughter to notify of order for NGT but unable to reach her.

## 2017-04-24 NOTE — PROGRESS NOTES
Attempted to give patient her morning medications, patient refused them. She also refused her breakfast and drink. Will continue to monitor and to offer food and drink.

## 2017-04-24 NOTE — PROGRESS NOTES
Progress Note    Patient: Zabrina Pelletier MRN: [id number]  SSN: [id number]    YOB: 1935  Age: 80 y.o. Sex: female      Admit Date: 4/22/2017    LOS: 2 days     Subjective:     79 YO   patient  admitted with Afib + RVR and R sided weakness. She was on an Amio drip and converted to NSR, she was converted to oral amio today. She is on anticoagulation with heparin. MRI + MRA showed severe disease of the posterior circulation + left CVA of the basal ganglia. TOday she remains drowsy and not cooperative with the nurse personnel. An NGT tube was inserted for tube feedings and medication administration. Objective:     Vitals:    04/24/17 0623 04/24/17 0720 04/24/17 1130 04/24/17 1215   BP:  141/63 174/67 183/72   Pulse:  82 79 98   Resp:  18 18    Temp:  96.5 °F (35.8 °C) 96.5 °F (35.8 °C)    SpO2:  95% 95%    Weight: 50.2 kg (110 lb 11.2 oz)      Height:            Intake and Output:  Current Shift:    Last three shifts: 04/22 1901 - 04/24 0700  In: 897.7 [I.V.:897.7]  Out: 925 [Urine:925]    Physical Exam:   GENERAL: drowsy   EYE: conjunctivae/corneas clear. PERRL, EOM's intact. Fundi benign  LYMPHATIC: Cervical, supraclavicular, and axillary nodes normal.   THROAT & NECK: normal and no erythema or exudates noted. LUNG: clear to auscultation bilaterally  HEART: regular rate and rhythm, S1, S2 normal, no murmur, click, rub or gallop  ABDOMEN: soft, non-tender. Bowel sounds normal. No masses,  no organomegaly  EXTREMITIES:  extremities normal, atraumatic, no cyanosis or edema  SKIN: Normal.  NEUROLOGIC: R sided paresis   PSYCHIATRIC: non focal    Lab/Data Review:  Lab results reviewed. For significant abnormal values and values requiring intervention, see assessment and plan.          Assessment:     Principal Problem:    Cerebrovascular accident (CVA) due to occlusion of precerebral artery ([address] (4/24/2017)    Active Problems:    Hypertensive urgency (4/22/2017)      Paroxysmal atrial fibrillation ([facility] [address] (4/22/2017)      Elevated troponin (4/24/2017)        Plan:     # converted to oral amiodarone today   #cotinue heparin drip, will be switched to oral anticoagulation soon   #on ASA   #Seen by speech therapy, allowed to have modified diet , however today she was drowsy, an NGT was inserted for tube feedings and medication administration   #On lopressor BID + norvasc .  Allowing her to have some permissive hypertension   #MRI showed acute CVA of the left basal ganglia   #MRA showed severe atherosclerosis of the posterior circulation     Signed By: Frank Ma MD     April 24, 2017

## 2017-04-24 NOTE — PROGRESS NOTES
Problem: Dysphagia (Adult)  Goal: *Acute Goals and Plan of Care (Insert Text)  ST. Pt. Will tolerate PO trials of diet upgrades with SLP at bedside with no overt s/sx of aspiration/penetration. Goal updated 2017   2. Patient will complete bedside swallow evaluation with 100% participation. 3. Pt. Will participate in speech/language/cognitive evaluation with 100% participation. 4. Patient will tolerate puree/honey thick liquids/no straws with no overt s/sx of aspiration/penetration. Goal added 2017    LTG: Pt. Will tolerate least restrictive diet without respiratory decline. SPEECH LANGUAGE PATHOLOGY: BEDSIDE SWALLOW NOTE: Daily Note 2     NAME/AGE/GENDER: Ap Cartwright is a 80 y.o. female  DATE: 2017  PRIMARY DIAGNOSIS: TIA (transient ischemic attack)  Hypertensive urgency  TIA (transient ischemic attack)  Hypertensive urgency       ICD-10: Treatment Diagnosis: R13.12 Dysphagia, Oropharyngeal Phase     INTERDISCIPLINARY COLLABORATION: Registered Nurse  PRECAUTIONS/ALLERGIES: Review of patient's allergies indicates no known allergies. ASSESSMENT:   Based on the objective data described below, Ms. Marcela Cartwright presents with continued increase in direction following/intelligible speech, however decrease in eye opening, bolus acceptance and tolerance of PO. Patient with increased swallow delay with overt strong coughing followed by continued delayed coughing with puree and honey thick liquids via tsp. Recommend return to NPO until demonstrates improvement at bedside. Recommend strict NPO due to aspiration risk and decline in swallow function. Patient will benefit from skilled intervention to address the below impairments. ?????? ? ? This section established at most recent assessment??????????  PROBLEM LIST (Impairments causing functional limitations):  1. dysphagia  REHABILITATION POTENTIAL FOR STATED GOALS: FAIR      PLAN OF CARE:   Patient will benefit from skilled intervention to address the following impairments. RECOMMENDATIONS AND PLANNED INTERVENTIONS (Benefits and precautions of therapy have been discussed with the patient.):  · NPO  MEDICATIONS:  · Non-oral  COMPENSATORY STRATEGIES/MODIFICATIONS INCLUDING:  ·   OTHER RECOMMENDATIONS (including follow up treatment recommendations): · Family training/education  · Patient education  RECOMMENDED DIET MODIFICATIONS DISCUSSED WITH:  · Nursing  · Patient  FREQUENCY/DURATION: Continue to follow patient 2 times a week as able or until goals met. RECOMMENDED REHABILITATION/EQUIPMENT: (at time of discharge pending progress):   Continue Skilled Therapy and Rehab. SUBJECTIVE:   Continued increase in direction follow/communication, however decreased bolus acceptance/poor eye opening attempts/overt s/sx with any Po trials. History of Present Injury/Illness: Ms. Nikolas Severino  has a past medical history of Anxiety (4/4/2017); Dementia (4/4/2017); Dizziness (4/4/2017); Hyperlipidemia (4/4/2017); Hypertension (4/4/2017); Hypothyroidism (4/4/2017); Osteoarthritis (4/4/2017); and PVD (peripheral vascular disease) ([facility] [address] (4/4/2017). .  She also  has a past surgical history that includes hysterectomy. Present Symptoms:    Pain Intensity 1: 0  Current Medications:   No current facility-administered medications on file prior to encounter. Current Outpatient Prescriptions on File Prior to Encounter   Medication Sig Dispense Refill    aspirin delayed-release 81 mg tablet Take  by mouth daily.  levothyroxine (SYNTHROID) 50 mcg tablet Take  by mouth Daily (before breakfast).  metoprolol tartrate (LOPRESSOR) 50 mg tablet Take  by mouth daily.  calcium carbonate 500 mg calcium (1,250 mg) tab 500 mg, ergocalciferol (vitamin d2) 400 unit tab 200 Units Take  by mouth daily.  pravastatin (PRAVACHOL) 40 mg tablet Take 40 mg by mouth nightly.        Current Dietary Status:  Puree/honey thick liquids           · History of reflux:  NO  Social History/Home Situation:    Home Environment: Private residence  One/Two Story Residence: Two story  Living Alone: No  Support Systems: Child(keysha), Family member(s)  Patient Expects to be Discharged to[de-identified] Rehabilitation facility  Current DME Used/Available at Home: None      OBJECTIVE:   Respiratory Status:  Room air     CXR Results:IMPRESSION: No acute abnormality. MRI Pending  CT Results:IMPRESSION:   1. No definite acute intracranial abnormality. Note, acute/subacute CVA cannot  be excluded secondary to the severity of the underlying white matter disease. 2. Severe chronic white matter microangiopathic disease with moderate bilateral  atrophy. Oral Motor Structure/Speech:  Oral-Motor Structure/Motor Speech  Labial: Decreased rate, Decreased seal, Right droop, Impaired coordination  Dentition: Upper & lower dentures  Oral Hygiene: adequate  Lingual: Decreased rate, Decreased strength, Incoordinated     Cognitive and Communication Status:  Neurologic State: Drowsy  Orientation Level: Oriented to person  Cognition: Decreased attention/concentration; Follows commands  Perception:  (cues to attend to any visual stimulus)  Perseveration: No perseveration noted  Safety/Judgement: Decreased awareness of environment;Decreased awareness of need for assistance;Decreased awareness of need for safety;Decreased insight into deficits     BEDSIDE SWALLOW EVALUATION  Oral Assessment:  Oral Assessment  Labial: Decreased rate;Decreased seal;Right droop; Impaired coordination  Dentition: Upper & lower dentures  Oral Hygiene: adequate  Lingual: Decreased rate;Decreased strength; Incoordinated  P.O.  Trials:  Patient Position: upright in bed     The patient was given bite/sip amounts of the following:   Consistency Presented: Honey thick liquid;Puree  How Presented: SLP-fed/presented;Spoon     ORAL PHASE:  Bolus Acceptance: Impaired (decreased)  Bolus Formation/Control: Impaired  Propulsion: Delayed (# of seconds)  Type of Impairment: Delayed; Anterior;Oral regurgitation; Incomplete  Oral Residue: Less than 10% of bolus     PHARYNGEAL PHASE:  Initiation of Swallow: Delayed (# of seconds)  Laryngeal Elevation: Decreased  Aspiration Signs/Symptoms: Strong cough;Delayed cough/throat clear  Vocal Quality: Low volume           Pharyngeal Phase Characteristics: Suspected pharyngeal residue;Poor endurance     OTHER OBSERVATIONS:  Rate/bite size: Impaired   Endurance:  Impaired      Comments: not appropriate for further trials     Tool Used: Dysphagia Outcome and Severity Scale (BLANCHE)     Score Comments   Normal Diet  [ ] 7 With no strategies or extra time needed   Functional Swallow  [ ] 6 May have mild oral or pharyngeal delay         Mild Dysphagia     [ ] 5 Which may require one diet consistency restricted (those who demonstrate penetration which is entirely cleared on MBS would be included)   Mild-Moderate Dysphagia  [ ] 4 With 1-2 diet consistencies restricted         Moderate Dysphagia  [ ] 3 With 2 or more diet consistencies restricted         Moderately Severe Dysphagia  [X] 2 With partial PO strategies (trials with ST only)         Severe Dysphagia  [ ] 1 With inability to tolerate any PO safely            Score:  Initial: 2 Most Recent: X (Date: -- )   Interpretation of Tool: The Dysphagia Outcome and Severity Scale (BLANCHE) is a simple, easy-to-use, 7-point scale developed to systematically rate the functional severity of dysphagia based on objective assessment and make recommendations for diet level, independence level, and type of nutrition.        Score 7 6 5 4 3 2 1   Modifier CH CI CJ CK CL CM CN   · Swallowing:               - CURRENT STATUS:           CM - 80%-99% impaired, limited or restricted               - GOAL STATUS:                   CJ - 20%-39% impaired, limited or restricted               - D/C STATUS:                       ---------------To be determined---------------  Payor: /       TREATMENT:         (In addition to Assessment/Re-Assessment sessions the following treatments were rendered)  Dysphagia Activities: Activities/Procedures listed utilized to improve progress in swallow strength, swallow timeliness, swallow function, diet tolerance and swallow safety. Required moderate to max cueing to improve swallow safety, work toward diet advancement and decrease aspiration risk. MODALITIES:                                                                     ORAL MOTOR  EXERCISES:                                                                                                                                                                       LARYNGEAL / PHARYNGEAL EXERCISES:                                                                                                                                      __________________________________________________________________________________________________  Safety:   After treatment position/precautions:  · Call light within reach  · RN notified  · Upright in Bed  Treatment Assessment:  Patient required mod max cues to participate in PO trials. Progression/Medical Necessity:   · Skilled intervention continues to be required due to persistent signs and symptoms of aspiration. Compliance with Program/Exercises: Will assess as treatment progresses. Reason for Continuation of Services/Other Comments:  · Patient continues to require skilled intervention due to medical complications and patient unable to attend/participate in therapy as expected. Recommendations/Intent for next treatment session: \"Treatment next visit will focus on trial PO with SLP only. \"   Total Treatment Duration:  Time In: 9828  Time Out: 26 Polly Torres MS, CCC-SLP

## 2017-04-25 ENCOUNTER — APPOINTMENT (OUTPATIENT)
Dept: CT IMAGING | Age: 82
DRG: 064 | End: 2017-04-25
Attending: INTERNAL MEDICINE
Payer: MEDICARE

## 2017-04-25 LAB
ANION GAP BLD CALC-SCNC: 12 MMOL/L (ref 7–16)
APTT PPP: 29.6 SEC (ref 23.5–31.7)
BACTERIA SPEC CULT: NEGATIVE
BACTERIA SPEC CULT: NORMAL
BUN SERPL-MCNC: 29 MG/DL (ref 8–23)
CALCIUM SERPL-MCNC: 8.4 MG/DL (ref 8.3–10.4)
CHLORIDE SERPL-SCNC: 111 MMOL/L (ref 98–107)
CO2 SERPL-SCNC: 25 MMOL/L (ref 21–32)
CREAT SERPL-MCNC: 0.74 MG/DL (ref 0.6–1)
ERYTHROCYTE [DISTWIDTH] IN BLOOD BY AUTOMATED COUNT: 14 % (ref 11.9–14.6)
GLUCOSE SERPL-MCNC: 106 MG/DL (ref 65–100)
HCT VFR BLD AUTO: 47.7 % (ref 35.8–46.3)
HGB BLD-MCNC: 15.9 G/DL (ref 11.7–15.4)
MCH RBC QN AUTO: 29.8 PG (ref 26.1–32.9)
MCHC RBC AUTO-ENTMCNC: 33.3 G/DL (ref 31.4–35)
MCV RBC AUTO: 89.3 FL (ref 79.6–97.8)
PLATELET # BLD AUTO: 127 K/UL (ref 150–450)
PMV BLD AUTO: 14.1 FL (ref 10.8–14.1)
POTASSIUM SERPL-SCNC: 3.4 MMOL/L (ref 3.5–5.1)
RBC # BLD AUTO: 5.34 M/UL (ref 4.05–5.25)
SERVICE CMNT-IMP: NORMAL
SODIUM SERPL-SCNC: 148 MMOL/L (ref 136–145)
WBC # BLD AUTO: 10.9 K/UL (ref 4.3–11.1)

## 2017-04-25 PROCEDURE — 77030019605

## 2017-04-25 PROCEDURE — 74011000250 HC RX REV CODE- 250: Performed by: INTERNAL MEDICINE

## 2017-04-25 PROCEDURE — 74011000258 HC RX REV CODE- 258: Performed by: HOSPITALIST

## 2017-04-25 PROCEDURE — 85730 THROMBOPLASTIN TIME PARTIAL: CPT | Performed by: INTERNAL MEDICINE

## 2017-04-25 PROCEDURE — 74011250636 HC RX REV CODE- 250/636: Performed by: HOSPITALIST

## 2017-04-25 PROCEDURE — 36415 COLL VENOUS BLD VENIPUNCTURE: CPT | Performed by: INTERNAL MEDICINE

## 2017-04-25 PROCEDURE — 74011250637 HC RX REV CODE- 250/637: Performed by: INTERNAL MEDICINE

## 2017-04-25 PROCEDURE — 97530 THERAPEUTIC ACTIVITIES: CPT

## 2017-04-25 PROCEDURE — 85027 COMPLETE CBC AUTOMATED: CPT | Performed by: INTERNAL MEDICINE

## 2017-04-25 PROCEDURE — 74011250637 HC RX REV CODE- 250/637: Performed by: PHYSICIAN ASSISTANT

## 2017-04-25 PROCEDURE — 74011250636 HC RX REV CODE- 250/636: Performed by: FAMILY MEDICINE

## 2017-04-25 PROCEDURE — 92526 ORAL FUNCTION THERAPY: CPT

## 2017-04-25 PROCEDURE — 65660000000 HC RM CCU STEPDOWN

## 2017-04-25 PROCEDURE — 74011250637 HC RX REV CODE- 250/637: Performed by: FAMILY MEDICINE

## 2017-04-25 PROCEDURE — 86580 TB INTRADERMAL TEST: CPT | Performed by: INTERNAL MEDICINE

## 2017-04-25 PROCEDURE — 74011000302 HC RX REV CODE- 302: Performed by: INTERNAL MEDICINE

## 2017-04-25 PROCEDURE — 74011000250 HC RX REV CODE- 250: Performed by: HOSPITALIST

## 2017-04-25 PROCEDURE — 70450 CT HEAD/BRAIN W/O DYE: CPT

## 2017-04-25 PROCEDURE — 74011250637 HC RX REV CODE- 250/637: Performed by: HOSPITALIST

## 2017-04-25 PROCEDURE — 80048 BASIC METABOLIC PNL TOTAL CA: CPT | Performed by: INTERNAL MEDICINE

## 2017-04-25 RX ORDER — LORAZEPAM 2 MG/ML
1 INJECTION INTRAMUSCULAR
Status: DISCONTINUED | OUTPATIENT
Start: 2017-04-25 | End: 2017-05-01 | Stop reason: HOSPADM

## 2017-04-25 RX ORDER — POLYVINYL ALCOHOL 14 MG/ML
1 SOLUTION/ DROPS OPHTHALMIC AS NEEDED
Status: DISCONTINUED | OUTPATIENT
Start: 2017-04-25 | End: 2017-05-01 | Stop reason: HOSPADM

## 2017-04-25 RX ADMIN — PIPERACILLIN SODIUM,TAZOBACTAM SODIUM 3.38 G: 3; .375 INJECTION, POWDER, FOR SOLUTION INTRAVENOUS at 05:47

## 2017-04-25 RX ADMIN — METOPROLOL TARTRATE 50 MG: 50 TABLET ORAL at 09:19

## 2017-04-25 RX ADMIN — Medication 10 ML: at 05:47

## 2017-04-25 RX ADMIN — HYDRALAZINE HYDROCHLORIDE 20 MG: 20 INJECTION INTRAMUSCULAR; INTRAVENOUS at 05:49

## 2017-04-25 RX ADMIN — Medication 10 ML: at 22:00

## 2017-04-25 RX ADMIN — AMLODIPINE BESYLATE 5 MG: 5 TABLET ORAL at 22:00

## 2017-04-25 RX ADMIN — METOPROLOL TARTRATE 50 MG: 50 TABLET ORAL at 17:53

## 2017-04-25 RX ADMIN — LEVOTHYROXINE SODIUM 50 MCG: 50 TABLET ORAL at 09:19

## 2017-04-25 RX ADMIN — POLYVINYL ALCOHOL 1 DROP: 14 SOLUTION/ DROPS OPHTHALMIC at 14:37

## 2017-04-25 RX ADMIN — TUBERCULIN PURIFIED PROTEIN DERIVATIVE 5 UNITS: 5 INJECTION, SOLUTION INTRADERMAL at 16:02

## 2017-04-25 RX ADMIN — LORAZEPAM 1 MG: 2 INJECTION INTRAMUSCULAR at 01:00

## 2017-04-25 RX ADMIN — PIPERACILLIN SODIUM,TAZOBACTAM SODIUM 3.38 G: 3; .375 INJECTION, POWDER, FOR SOLUTION INTRAVENOUS at 22:00

## 2017-04-25 RX ADMIN — PIPERACILLIN SODIUM,TAZOBACTAM SODIUM 3.38 G: 3; .375 INJECTION, POWDER, FOR SOLUTION INTRAVENOUS at 13:41

## 2017-04-25 RX ADMIN — PRAVASTATIN SODIUM 40 MG: 20 TABLET ORAL at 22:00

## 2017-04-25 RX ADMIN — Medication 5 ML: at 14:00

## 2017-04-25 RX ADMIN — FAMOTIDINE 20 MG: 20 TABLET ORAL at 16:04

## 2017-04-25 RX ADMIN — LISINOPRIL 10 MG: 5 TABLET ORAL at 09:19

## 2017-04-25 RX ADMIN — METOPROLOL TARTRATE 5 MG: 5 INJECTION INTRAVENOUS at 18:39

## 2017-04-25 RX ADMIN — AMIODARONE HYDROCHLORIDE 400 MG: 200 TABLET ORAL at 22:00

## 2017-04-25 RX ADMIN — AMIODARONE HYDROCHLORIDE 400 MG: 200 TABLET ORAL at 09:19

## 2017-04-25 RX ADMIN — ASPIRIN 81 MG: 81 TABLET, CHEWABLE ORAL at 09:19

## 2017-04-25 NOTE — PROGRESS NOTES
Problem: Dysphagia (Adult)  Goal: *Acute Goals and Plan of Care (Insert Text)  ST. Pt. Will tolerate PO trials of diet upgrades with SLP at bedside with no overt s/sx of aspiration/penetration. Goal updated 2017   2. Patient will complete bedside swallow evaluation with 100% participation. 3. Pt. Will participate in speech/language/cognitive evaluation with 100% participation. 4. Patient will tolerate puree/honey thick liquids/no straws with no overt s/sx of aspiration/penetration. Goal added 2017    LTG: Pt. Will tolerate least restrictive diet without respiratory decline. SPEECH LANGUAGE PATHOLOGY: BEDSIDE SWALLOW NOTE: Daily Note 3     NAME/AGE/GENDER: Ap Hammond is a 80 y.o. female  DATE: 2017  PRIMARY DIAGNOSIS: TIA (transient ischemic attack)  Hypertensive urgency  TIA (transient ischemic attack)  Hypertensive urgency       ICD-10: Treatment Diagnosis: R13.12 Dysphagia, Oropharyngeal Phase     INTERDISCIPLINARY COLLABORATION: Registered Nurse  PRECAUTIONS/ALLERGIES: Review of patient's allergies indicates no known allergies. ASSESSMENT:   Based on the objective data described below, Ms. Angelica Hammond presents with increased alertness, communication, and bolus acceptance, however continues to demonstrate +overt s/sx with honey thick liquids and puree. Patient with delayed cough with second tsp presentation of honey thick liquids and subsequent presentation of applesauce. Appears uncomfortable post intake which resolves with stronger cough episodes and additional swallows. Multiple swallow attempts noted with each presentation in attempt to clear suspected pharyngeal residue. Nurse present and will notify MD. Manny Arias continue strict NPO due to aspiration risk, PO trials with SLP only. Patient will benefit from skilled intervention to address the below impairments. ?????? ? ? This section established at most recent assessment??????????  PROBLEM LIST (Impairments causing functional limitations):  1. dysphagia  REHABILITATION POTENTIAL FOR STATED GOALS: FAIR      PLAN OF CARE:   Patient will benefit from skilled intervention to address the following impairments. RECOMMENDATIONS AND PLANNED INTERVENTIONS (Benefits and precautions of therapy have been discussed with the patient.):  · NPO  MEDICATIONS:  · Non-oral  COMPENSATORY STRATEGIES/MODIFICATIONS INCLUDING:  ·   OTHER RECOMMENDATIONS (including follow up treatment recommendations): · Family training/education  · Patient education  RECOMMENDED DIET MODIFICATIONS DISCUSSED WITH:  · Nursing  · Patient  FREQUENCY/DURATION: Continue to follow patient 2 times a week as able or until goals met. RECOMMENDED REHABILITATION/EQUIPMENT: (at time of discharge pending progress):   Continue Skilled Therapy and Rehab. SUBJECTIVE:   Increased alertness/bolus acceptance, however +s/sx of aspiration. Tells me her daughter was here \"earlier. \" Answers some basic y/n questions. History of Present Injury/Illness: Ms. Esau Hanna  has a past medical history of Anxiety (4/4/2017); Dementia (4/4/2017); Dizziness (4/4/2017); Hyperlipidemia (4/4/2017); Hypertension (4/4/2017); Hypothyroidism (4/4/2017); Osteoarthritis (4/4/2017); and PVD (peripheral vascular disease) ([facility] [address] (4/4/2017). .  She also  has a past surgical history that includes hysterectomy. Present Symptoms:    Pain Intensity 1: 0  Current Medications:   No current facility-administered medications on file prior to encounter. Current Outpatient Prescriptions on File Prior to Encounter   Medication Sig Dispense Refill    aspirin delayed-release 81 mg tablet Take  by mouth daily.  levothyroxine (SYNTHROID) 50 mcg tablet Take  by mouth Daily (before breakfast).  metoprolol tartrate (LOPRESSOR) 50 mg tablet Take  by mouth daily.  calcium carbonate 500 mg calcium (1,250 mg) tab 500 mg, ergocalciferol (vitamin d2) 400 unit tab 200 Units Take  by mouth daily.       pravastatin (PRAVACHOL) 40 mg tablet Take 40 mg by mouth nightly. · Current Dietary Status:  NPO  ·   History of reflux:  NO  Social History/Home Situation:    Home Environment: Private residence  One/Two Story Residence: Two story  Living Alone: No  Support Systems: Child(keysha), Family member(s)  Patient Expects to be Discharged to[de-identified] Rehabilitation facility  Current DME Used/Available at Home: None      OBJECTIVE:   Respiratory Status:  Room air     CXR Results:IMPRESSION: No acute abnormality. MRI Pending  CT Results:IMPRESSION:   1. No definite acute intracranial abnormality. Note, acute/subacute CVA cannot  be excluded secondary to the severity of the underlying white matter disease. 2. Severe chronic white matter microangiopathic disease with moderate bilateral  atrophy. Oral Motor Structure/Speech:  Oral-Motor Structure/Motor Speech  Labial: Decreased rate, Decreased seal, Right droop  Dentition: Edentulous  Oral Hygiene: dry  Lingual: Decreased rate, Decreased strength, Incoordinated     Cognitive and Communication Status:  Neurologic State: Eyes open to stimulus  Orientation Level: Oriented to person  Cognition: Follows commands  Perception: Appears intact  Perseveration: No perseveration noted  Safety/Judgement: Decreased awareness of environment;Decreased awareness of need for assistance;Decreased awareness of need for safety;Decreased insight into deficits     BEDSIDE SWALLOW EVALUATION  Oral Assessment:  Oral Assessment  Labial: Decreased rate;Decreased seal;Right droop  Dentition: Edentulous  Oral Hygiene: dry  Lingual: Decreased rate;Decreased strength; Incoordinated  P.O.  Trials:  Patient Position: upright in bed     The patient was given bite/sip amounts of the following:   Consistency Presented: Honey thick liquid;Puree  How Presented: SLP-fed/presented;Spoon     ORAL PHASE:  Bolus Acceptance: Impaired  Bolus Formation/Control: Impaired  Propulsion: Delayed (# of seconds)  Type of Impairment: Delayed; Anterior;Piecemeal  Oral Residue: None     PHARYNGEAL PHASE:  Initiation of Swallow: Delayed (# of seconds)  Laryngeal Elevation: Decreased;Weak  Aspiration Signs/Symptoms: Change vocal quality;Delayed cough/throat clear  Vocal Quality: Low volume           Pharyngeal Phase Characteristics: Suspected pharyngeal residue     OTHER OBSERVATIONS:  Rate/bite size: Impaired   Endurance:  Impaired      Comments: not appropriate for further trials     Tool Used: Dysphagia Outcome and Severity Scale (BLANCHE)     Score Comments   Normal Diet  [ ] 7 With no strategies or extra time needed   Functional Swallow  [ ] 6 May have mild oral or pharyngeal delay         Mild Dysphagia     [ ] 5 Which may require one diet consistency restricted (those who demonstrate penetration which is entirely cleared on MBS would be included)   Mild-Moderate Dysphagia  [ ] 4 With 1-2 diet consistencies restricted         Moderate Dysphagia  [ ] 3 With 2 or more diet consistencies restricted         Moderately Severe Dysphagia  [X] 2 With partial PO strategies (trials with ST only)         Severe Dysphagia  [ ] 1 With inability to tolerate any PO safely            Score:  Initial: 2 Most Recent: X (Date: -- )   Interpretation of Tool: The Dysphagia Outcome and Severity Scale (BLANCHE) is a simple, easy-to-use, 7-point scale developed to systematically rate the functional severity of dysphagia based on objective assessment and make recommendations for diet level, independence level, and type of nutrition.        Score 7 6 5 4 3 2 1   Modifier CH CI CJ CK CL CM CN   · Swallowing:               - CURRENT STATUS:           CM - 80%-99% impaired, limited or restricted               - GOAL STATUS:                   CJ - 20%-39% impaired, limited or restricted               - D/C STATUS:                       ---------------To be determined---------------  [place] / [place] / Product Type: Medicare / TREATMENT:         (In addition to Assessment/Re-Assessment sessions the following treatments were rendered)  Dysphagia Activities: Activities/Procedures listed utilized to improve progress in swallow strength, swallow timeliness, swallow function, diet tolerance and swallow safety. Required moderate to max cueing to improve swallow safety, work toward diet advancement and decrease aspiration risk. MODALITIES:                                                                     ORAL MOTOR  EXERCISES:                                                                                                                                                                       LARYNGEAL / PHARYNGEAL EXERCISES:                                                                                                                                      __________________________________________________________________________________________________  Safety:   After treatment position/precautions:  · Call light within reach  · RN notified  · Upright in Bed  Treatment Assessment:  Patient required mod cues to participate in PO trials. Progression/Medical Necessity:   · Skilled intervention continues to be required due to persistent signs and symptoms of aspiration. Compliance with Program/Exercises: Will assess as treatment progresses. Reason for Continuation of Services/Other Comments:  · Patient continues to require skilled intervention due to medical complications and patient unable to attend/participate in therapy as expected. Recommendations/Intent for next treatment session: \"Treatment next visit will focus on trial PO with SLP only. \"   Total Treatment Duration:  Time In: 1645  Time Out: 1703 [facility].  MS Brian, CCC-SLP

## 2017-04-25 NOTE — PROGRESS NOTES
Progress Note    Patient: Juan Boss MRN: [id number]  SSN: [id number]    YOB: 1935  Age: 80 y.o. Sex: female      Admit Date: 4/22/2017    LOS: 3 days     Subjective:     79 YO   patient  admitted with Afib + RVR and R sided weakness. She was on an Amio drip and converted to NSR, she was converted to oral amio today. She is on anticoagulation with heparin. MRI + MRA showed severe disease of the posterior circulation + left CVA of the basal ganglia. Yesterday she was drowsy and not cooperative with the nurse personnel. An NGT tube was inserted for tube feedings and medication administration. 04/25/2017- seen drowsy but arousable following commands    Objective:     Vitals:    04/25/17 0706 04/25/17 0820 04/25/17 0932 04/25/17 1208   BP: 144/48 180/64 153/58 164/60   Pulse:  90 90 67   Resp:  18 16 20   Temp:  96.5 °F (35.8 °C)  97 °F (36.1 °C)   SpO2:    97%   Weight:       Height:            Intake and Output:  Current Shift: 04/25 0701 - 04/25 1900  In: 620 [I.V.:500]  Out: 400 [Urine:400]  Last three shifts: 04/23 1901 - 04/25 0700  In: 170 [P.O.:50]  Out: 500 [Urine:500]    Physical Exam:   GENERAL: drowsy   EYE: conjunctivae/corneas clear. PERRL, EOM's intact. Fundi benign  LYMPHATIC: Cervical, supraclavicular, and axillary nodes normal.   THROAT & NECK: normal and no erythema or exudates noted. LUNG: clear to auscultation bilaterally  HEART: regular rate and rhythm, S1, S2 normal, no murmur, click, rub or gallop  ABDOMEN: soft, non-tender. Bowel sounds normal. No masses,  no organomegaly  EXTREMITIES:  extremities normal, atraumatic, no cyanosis or edema  SKIN: Normal.  NEUROLOGIC: R sided paresis   PSYCHIATRIC: non focal    Lab/Data Review:  Lab results reviewed. For significant abnormal values and values requiring intervention, see assessment and plan.          Assessment:     Principal Problem:    Cerebrovascular accident (CVA) due to occlusion of precerebral artery ([address] (4/24/2017)    Active Problems:    Hypertensive urgency (4/22/2017)      Paroxysmal atrial fibrillation (HCC) (4/22/2017)      Elevated troponin (4/24/2017)        Plan:     # on oral amiodarone   # pt is on a heparin drip, check ct for any hemmorhagic conversion- d/w neuro when we can switch  to oral anticoagulation soon   # on ASA   # Seen by speech therapy, reportedly allowed to have modified diet , however she was drowsy yesterday- appears more alert today, an NGT was inserted yesterday for tube feedings and medication administration - will d/w speech -restarting a diet  # On lopressor BID + norvasc .  Allowing her to have some permissive hypertension   # MRI showed acute CVA of the left basal ganglia   # MRA showed severe atherosclerosis of the posterior circulation   # Ppd placement  # sw consult for placement    Signed By: Tati Hunter MD     April 25, 2017

## 2017-04-25 NOTE — PROGRESS NOTES
Pt bp continues to be 729'F to 781'X systolic and pt agitated. DR. William Fletcher notified and new orders received.

## 2017-04-25 NOTE — PROGRESS NOTES
Problem: Nutrition Deficit  Goal: *Optimize nutritional status  Nutrition:  Nutrition Consult for TF Management. (Dr. Bethany Hubbard) Late entry  Assessment:  Anthropometrics:              Ht - 5'0\", wgt - 50.2 kg (3rd floor telemetry 4/24/17), BMI 21.6 c/w underweight in a person >72years of age, edema - none. Macronutrient Needs:  Estimated calorie needs - 5033-7510 aline/day (25-28 aline/kg/day)   Estimated protein needs - 54-70 gm pro/day (1.2-1.3 gm pro/kgIBW/day) (GFR >60 ml/min)  Max CHO/day - 210 gm CHO/day (60% aline/day)   Fluid/day - 1.3-1.4 liters/day (1 ml/aline/day)  Intake/Comparative Standards: The patient is currently NPO which meets 0% of her calorie and 0% of her protein needs. Pertinent Labs/Hemodynamic Parameters:              Sodium 148, potassium 3.4. GI Function/Activity:              Hyperactive bowel sounds, diarrhea. Diet:              NPO. Food/Nutrition History:              80year old lady with a h/o dementia admitted with left CVA. She was made NPO by ST yesterday and alternate nutrition recommended due to increased aspiration risk associated with functional decline. A feeding tube was placed and bloody gastric residual was reported when checked prior to starting TF. The feeding was never started and she remains NPO at this time. Diagnosis (Nutrition): Inadequate energy intake related to dysphagia as evidenced by the patient being NPO needing TF for nutrition support. Intervention:  Meals and Snacks: NPO. Enteral Nutrition: When able to start the TF, start with Jevity 1.2 @ 20 ml/hr with a 25 ml/hr water flush via NGT. Increase TF as tolerated to the goal rate of 50 ml/hr -  1440 calories/day (100% calorie goal), 67 grams protein/day (100% protein goal), 203 grams CHO/day (does not exceed max CHO limit) and 1572 ml water/day (100% fluid goal). Ashley Echols.  [facility]  [phone]

## 2017-04-25 NOTE — PROGRESS NOTES
[facility] CARDIOLOGY PROGRESS NOTE           4/25/2017 9:56 AM    Admit Date: 4/22/2017      Subjective:   Patient remains somnolent and in Sr on PO amiodarone    ROS:  Cardiovascular:  As noted above    Objective:      Vitals:    04/25/17 0415 04/25/17 0549 04/25/17 0607 04/25/17 0706   BP: 176/61 196/77 176/61 144/48   Pulse:  88 (!) 103    Resp:   18    Temp:   97.6 °F (36.4 °C)    SpO2:   94%    Weight:       Height:           Physical Exam:  General-No Acute Distress  Neck- supple, no JVD  CV- regular rate and rhythm no MRG  Lung- clear bilaterally  Abd- soft, nontender, nondistended  Ext- no edema bilaterally. Skin- warm and dry    Data Review:   Recent Labs      04/24/17   0508  04/23/17   1244  04/23/17   0415   04/22/17   0905   NA  147*  146*   --    --   144   K  3.6  3.6   --    --   4.1   BUN  31*  33*   --    --   27*   CREA  0.85  0.89   --    --   0.94   GLU  100  142*   --    --   110*   WBC  18.7*  18.1*   --    < >  11.2*   HGB  15.9*  15.5*   --    < >  17.3*   HCT  46.8*  47.1*   --    < >  52.4*   PLT  145*  127*   --    < >  141*   INR   --    --    --    --   1.1   TROIQ   --   2.90*   --    --   0.22*   CHOL   --    --   175   --    --    TGL   --    --   73   --    --    LDLC   --    --   86.4   --    --    HDL   --    --   74*   --    --     < > = values in this interval not displayed. Assessment/Plan:     Active Problems:    TIA (transient ischemic attack) (4/22/2017) - MRI with CVA. Needs Eliquis 5mg BID once OK with primary team and neurology      Hypertensive urgency (4/22/2017) - This has all resolved. Paroxysmal atrial fibrillation (HCC) (4/22/2017) - In SR on amiodarone 400mg BID and and heparin. When stable and no additional procedures plan would change to Eliquis 5mg BID.   Change amiodarone to 200mg BID in 4 days          Cary Coy MD  4/25/2017 9:56 AM

## 2017-04-25 NOTE — PROCEDURES
[address]   ELECTROENCEPHALOGRAM       Name:  Sixto Hardy   MR#:  [id number]   :  1935   Account #:  [de-identified]   Date of Adm:  2017       CLINICAL INFORMATION: Dementia, possible seizures. FINDINGS: This is a routine 20-channel EEG. The predominant   background rhythm has a frequency of 8 Hz. There is normal and   symmetric reactivity to eye opening and eye closure. There is a   moderate to abundant degree of muscle tension artifact during   the recording. Subtle theta range slowing is noted with larger   amplitudes seen over the right hemisphere, particularly in the   frontocentral and to a lesser extent temporoparietal region. No   definite spike or sharp discharges are encountered. Disorganized   sleep architecture is briefly encountered. Localized slowing is   most notable during deep drowsiness and light sleep, where it is   predominant over the centrotemporal area. Photic stimulation is   unremarkable. INTERPRETATION: This is an abnormal EEG due to generalized   slowing into the 8 Hz range, suggesting mild nonspecific and   diffuse encephalopathy. In addition, there appears to be a   region of localized cerebral dysfunction causing amplitude   Asymmetry, with larger amplitudes seen over the right   hemisphere. Localization is difficult based on the current   recording and findings in both the frontocentral and centrotemporal   distribution. Correlation with structural imaging is   recommended. No signs of epilepsy were observed.         Lizz Rosa MD      [facility], [facility].   D:  2017   21:23   T:  2017   07:27   Job #:  [id number]

## 2017-04-25 NOTE — PROGRESS NOTES
Verbal bedside report given to amber mccoy RN. Patient's situation, background, assessment and recommendations provided. Opportunity for questions provided. Amber RN assumed care of patient.

## 2017-04-25 NOTE — PROGRESS NOTES
Assessment completed on patient. While checking residual of NGT bright red blood was pulled back through tube. Was also notified by CNA that while patient was getting brief changed, nose started bleeding. Tube feeding was stopped. Dr. Damien Moran notified and new orders to hold heparin at this time and get KUB.   Will continue to monitor patient closely

## 2017-04-25 NOTE — PROGRESS NOTES
Rounded with nurse, Interpreting Services offered when needed.     [address] [facility]  [phone]

## 2017-04-25 NOTE — PROGRESS NOTES
Patient responsive by incomprehensible words and by  on left hand. Patient bathed, brief changed, no pressure sore noted and Allevyn in place. Patient turned to right side. Heels floated. SCD's on. Mitt on left hand for patient safety. Bed alarm is on.

## 2017-04-25 NOTE — PROGRESS NOTES
Problem: Mobility Impaired (Adult and Pediatric)  Goal: *Therapy Goal (Edit Goal, Insert Text)  Right sided hemiplegia  STG:  (1.)Ms. Katja Michael will move from supine to sit and sit to supine , scoot up and down and roll side to side with MODERATE ASSIST within 7 day(s). (2.)Ms. Katja Michael will transfer from bed to chair and chair to bed with MODERATE ASSIST using the least restrictive device within 7 day(s). (3.)Ms. Katja Michael will ambulate with MAXIMAL ASSIST for 5 feet with the least restrictive device within 7 day(s). LTG:  (1.)Ms. Katja Michael will move from supine to sit and sit to supine , scoot up and down and roll side to side in bed with MINIMAL ASSIST within 14 day(s). (2.)Ms. Katja Michael will transfer from bed to chair and chair to bed with MINIMAL ASSIST using the least restrictive device within 14 day(s). (3.)Ms. Katja Michael will ambulate with MODERATE ASSIST for 25 feet with the least restrictive device within 14 day(s). ________________________________________________________________________________________________       PHYSICAL THERAPY: Daily Note, Treatment Day: 1st and AM 4/25/2017  INPATIENT: Hospital Day: 4  [place] / Product Type: Medicare /      NAME/AGE/GENDER: Ap Michael is a 80 y.o. female         PRIMARY DIAGNOSIS: TIA (transient ischemic attack)  Hypertensive urgency  TIA (transient ischemic attack)  Hypertensive urgency Cerebrovascular accident (CVA) due to occlusion of precerebral artery ([address] Cerebrovascular accident (CVA) due to occlusion of precerebral artery ([address]        ICD-10: Treatment Diagnosis:       · Paralytic gait (R26.1)  · Hemiplegia and hemiparesis following cerebral infarction affecting right dominant side (I04.287)   Precaution/Allergies:  Review of patient's allergies indicates no known allergies. ASSESSMENT:      Ms. Katja Michael presents with right sided hemiplegia lying in bed and aphasic. R LE with extensor hypertonicity. Attempted exercises in supine, required max verbal and tactile cueing to follow commands. Transitioned to sit with max to total assist.  Sat with trunk lean posteriorly and head rotated left. Worked on sitting balance, then sit to stand. Patient stood with max assist of 2 and able to take few small steps at edge of bed with max assist of 2. Began having BM so assisted to sit. Patient then able to prop sit with CGA for a couple minutes. Patient transfers back to bed with maximal assistance x 2 to supine. Patient demonstrated progress with sitting balance and ability to take steps today. Skilled PT is indicated for patient safety and mobility progression during current acute care episode. This section established at most recent assessment   PROBLEM LIST (Impairments causing functional limitations):  1. Decreased Strength  2. Decreased ADL/Functional Activities  3. Decreased Transfer Abilities  4. Decreased Ambulation Ability/Technique  5. Decreased Balance  6. Decreased Activity Tolerance  7. Decreased Pacing Skills  8. Decreased Flexibility/Joint Mobility  9. Decreased [place] with Home Exercise Program    INTERVENTIONS PLANNED: (Benefits and precautions of physical therapy have been discussed with the patient.)  1. Balance Exercise  2. Bed Mobility  3. Family Education  4. Gait Training  5. Home Exercise Program (HEP)  6. Range of Motion (ROM)  7. Therapeutic Activites  8. Therapeutic Exercise/Strengthening  9. Transfer Training  10. Neuromuscular reeducation      TREATMENT PLAN: Frequency/Duration: 3-5 times a week for duration of hospital stay  Rehabilitation Potential For Stated Goals: GOOD      RECOMMENDED REHABILITATION/EQUIPMENT: (at time of discharge pending progress): Continue Skilled Therapy and and to be determined at medical discharge.                      HISTORY:   History of Present Injury/Illness (Reason for Referral):  PER MD H&P  Patient is 81 y/o  female with pmhx of dyslipidemia, HTN, hypothyroidism, osteoarthritis, PVD who presented to ER in view of right sided weakness, lethargy and drowsiness. Patient was evaluated in ER in presence of patient's daughter. According to the daughter (primary history provider as patient is drowsy), her mother was in usual health until last night. This morning when she went to check on her mother, she looked very weak and complained of right sided weakness. She also wasn't answering questions appropriately. At baseline, she is pretty much active, independent with ADL. ROS could not be obtained, but patient denied having any chest pain or shortness of breath. In the ER, she also had new onset A. Fib with RVR, for which she was started on cardiazem drip after receiving a bolus. Her blood pressure was elevated SBP > 210, DBP > 100, which came down after one dose of IV hydralazine. During A.fib, she started frothing from the mouth, but no seizure like activity was noticed. Past Medical History/Comorbidities:   Ms. Kitty Arora  has a past medical history of Anxiety (4/4/2017); Dementia (4/4/2017); Dizziness (4/4/2017); Hyperlipidemia (4/4/2017); Hypertension (4/4/2017); Hypothyroidism (4/4/2017); Osteoarthritis (4/4/2017); and PVD [facility]peripheral vascular disease) ([facility] (4/4/2017). Ms. Kitty Arora  has a past surgical history that includes hysterectomy. Social History/Living Environment:   Home Environment: Private residence  One/Two Story Residence: Two story  Living Alone: No  Support Systems: Child(keysha), Family member(s)  Patient Expects to be Discharged to[de-identified] Rehabilitation facility  Current DME Used/Available at Home: None Per patient daughter, patient lives with her and walk around the house only. Prior Level of Function/Work/Activity:  Limited to in house ambulation and mobility only per patient   Dominant Side:         RIGHT  Personal Factors:          Sex:  female        Age:  80 y.o.    Number of Personal Factors/Comorbidities that affect the Plan of Care: 1-2: MODERATE COMPLEXITY   EXAMINATION:   Most Recent Physical Functioning:   Gross Assessment:                  Posture:     Balance:  Sitting: Impaired  Sitting - Static: Poor (constant support)  Sitting - Dynamic: Poor (constant support)  Standing: Impaired  Standing - Static: Constant support  Standing - Dynamic : Poor Bed Mobility:  Supine to Sit: Maximum assistance; Total assistance  Sit to Supine: Maximum assistance;Assist x2  Scooting: Maximum assistance  Duration: 25 Minutes  Wheelchair Mobility:     Transfers:  Sit to Stand: Maximum assistance;Assist x2  Stand to Sit: Maximum assistance;Assist x2  Gait:     Base of Support: Widened  Speed/Radha: Slow;Shuffled  Step Length: Right shortened;Left shortened  Gait Abnormalities: Shuffling gait  Distance (ft): 3 Feet (ft) (sidestepping at edge of bed)  Assistive Device: Gait belt  Ambulation - Level of Assistance: Maximum assistance;Assist x2       Body Structures Involved:  1. Metabolic  2. Bones  3. Joints  4. Muscles  5. Ligaments Body Functions Affected:  1. Neuromusculoskeletal  2. Movement Related  3. Skin Related  4. Metobolic/Endocrine Activities and Participation Affected:  1. General Tasks and Demands  2. Mobility  3. Self Care  4. Community, Social and [place]   Number of elements that affect the Plan of Care: 4+: HIGH COMPLEXITY   CLINICAL PRESENTATION:   Presentation: Evolving clinical presentation with changing clinical characteristics: MODERATE COMPLEXITY   CLINICAL DECISION MAKIN [facility] Short Form  How much difficulty does the patient currently have. .. Unable A Lot A Little None   1. Turning over in bed (including adjusting bedclothes, sheets and blankets)? [ ] 1   [X] 2   [ ] 3   [ ] 4   2. Sitting down on and standing up from a chair with arms ( e.g., wheelchair, bedside commode, etc.)   [ ] 1   [X] 2   [ ] 3   [ ] 4   3.   Moving from lying on back to sitting on the side of the bed? [ ] 1   [X] 2   [ ] 3   [ ] 4   How much help from another person does the patient currently need. .. Total A Lot A Little None   4. Moving to and from a bed to a chair (including a wheelchair)? [X] 1   [ ] 2   [ ] 3   [ ] 4   5. Need to walk in hospital room? [X] 1   [ ] 2   [ ] 3   [ ] 4   6. Climbing 3-5 steps with a railing? [X] 1   [ ] 2   [ ] 3   [ ] 4   © 2007, Trustees of [address] [place] [address] [address] [id number], under license to [employer]. All rights reserved    Score:  Initial: 9 Most Recent: X (Date: -- )     Interpretation of Tool:  Represents activities that are increasingly more difficult (i.e. Bed mobility, Transfers, Gait). Score 24 23 22-20 19-15 14-10 9-7 6       Modifier CH CI CJ CK CL CM CN         · Mobility - Walking and Moving Around:               - CURRENT STATUS:    CM - 80%-99% impaired, limited or restricted               - GOAL STATUS:           CL - 60%-79% impaired, limited or restricted               - D/C STATUS:                       ---------------To be determined---------------  [place] / Product Type: Medicare /       Medical Necessity:     · Patient demonstrates fair rehab potential due to higher previous functional level. Reason for Services/Other Comments:  · Patient continues to require skilled intervention due to medical complications, patient unable to attend/participate in therapy as expected and s/p CVA affecting right side. Use of outcome tool(s) and clinical judgement create a POC that gives a: Questionable prediction of patient's progress: MODERATE COMPLEXITY                 TREATMENT:   (In addition to Assessment/Re-Assessment sessions the following treatments were rendered)   Pre-treatment Symptoms/Complaints:  0/10 at this time.     Pain: Initial:   Pain Intensity 1: 0  Post Session:  0/10     Therapeutic Activity: (25 Minutes   ):  Therapeutic activities including Bed transfers, Ambulation on level ground and sitting balance and sit to stand to improve mobility, strength and balance. Required moderate   to promote static and dynamic balance in sitting and promote motor control of bilateral, lower extremity(s). Performed heel slides L LE with max cueing. Braces/Orthotics/Lines/Etc:   · Eagle monitor  Treatment/Session Assessment:    · Response to Treatment: participated with decreased attention and command following  · Interdisciplinary Collaboration:  · Physical Therapist  · Registered Nurse  · After treatment position/precautions:  · Supine in bed, Bed/Chair-wheels locked, Bed in low position, Call light within reach, RN notified and Side rails x 3  · Compliance with Program/Exercises: Will assess as treatment progresses. · Recommendations/Intent for next treatment session: \"Next visit will focus on advancements to more challenging activities, reduction in assistance provided and neuromuscular reeducation. \".   Total Treatment Duration:  PT Patient Time In/Time Out  Time In: 1048  Time Out: 45079 Mayte Tinsley, PT

## 2017-04-26 LAB
ANION GAP BLD CALC-SCNC: 11 MMOL/L (ref 7–16)
APTT PPP: 32.7 SEC (ref 23.5–31.7)
BACTERIA SPEC CULT: ABNORMAL
BASOPHILS # BLD AUTO: 0 K/UL (ref 0–0.2)
BASOPHILS # BLD: 0 % (ref 0–2)
BUN SERPL-MCNC: 25 MG/DL (ref 8–23)
CALCIUM SERPL-MCNC: 8.9 MG/DL (ref 8.3–10.4)
CHLORIDE SERPL-SCNC: 106 MMOL/L (ref 98–107)
CO2 SERPL-SCNC: 26 MMOL/L (ref 21–32)
COLLECTION COMMENT, COLCM: NORMAL
CREAT SERPL-MCNC: 0.66 MG/DL (ref 0.6–1)
DIFFERENTIAL METHOD BLD: ABNORMAL
EOSINOPHIL # BLD: 0 K/UL (ref 0–0.8)
EOSINOPHIL NFR BLD: 0 % (ref 0.5–7.8)
ERYTHROCYTE [DISTWIDTH] IN BLOOD BY AUTOMATED COUNT: 13.6 % (ref 11.9–14.6)
GLUCOSE SERPL-MCNC: 128 MG/DL (ref 65–100)
HCT VFR BLD AUTO: 47.6 % (ref 35.8–46.3)
HGB BLD-MCNC: 16.5 G/DL (ref 11.7–15.4)
IMM GRANULOCYTES # BLD: 0 K/UL (ref 0–0.5)
IMM GRANULOCYTES NFR BLD AUTO: 0.3 % (ref 0–5)
LYMPHOCYTES # BLD AUTO: 14 % (ref 13–44)
LYMPHOCYTES # BLD: 1.4 K/UL (ref 0.5–4.6)
MAGNESIUM SERPL-MCNC: 2.2 MG/DL (ref 1.8–2.4)
MCH RBC QN AUTO: 29.8 PG (ref 26.1–32.9)
MCHC RBC AUTO-ENTMCNC: 34.7 G/DL (ref 31.4–35)
MCV RBC AUTO: 85.9 FL (ref 79.6–97.8)
MM INDURATION POC: 0 MM (ref 0–5)
MONOCYTES # BLD: 1 K/UL (ref 0.1–1.3)
MONOCYTES NFR BLD AUTO: 11 % (ref 4–12)
NEUTS SEG # BLD: 7.3 K/UL (ref 1.7–8.2)
NEUTS SEG NFR BLD AUTO: 75 % (ref 43–78)
PLATELET # BLD AUTO: 120 K/UL (ref 150–450)
PMV BLD AUTO: 13.2 FL (ref 10.8–14.1)
POTASSIUM SERPL-SCNC: 3.1 MMOL/L (ref 3.5–5.1)
PPD POC: NORMAL NEGATIVE
RBC # BLD AUTO: 5.54 M/UL (ref 4.05–5.25)
SERVICE CMNT-IMP: ABNORMAL
SODIUM SERPL-SCNC: 143 MMOL/L (ref 136–145)
WBC # BLD AUTO: 9.7 K/UL (ref 4.3–11.1)

## 2017-04-26 PROCEDURE — 97535 SELF CARE MNGMENT TRAINING: CPT

## 2017-04-26 PROCEDURE — 92526 ORAL FUNCTION THERAPY: CPT

## 2017-04-26 PROCEDURE — 74011000250 HC RX REV CODE- 250: Performed by: INTERNAL MEDICINE

## 2017-04-26 PROCEDURE — 74011250637 HC RX REV CODE- 250/637: Performed by: INTERNAL MEDICINE

## 2017-04-26 PROCEDURE — 74011250637 HC RX REV CODE- 250/637: Performed by: PHYSICIAN ASSISTANT

## 2017-04-26 PROCEDURE — 97530 THERAPEUTIC ACTIVITIES: CPT

## 2017-04-26 PROCEDURE — 80048 BASIC METABOLIC PNL TOTAL CA: CPT | Performed by: HOSPITALIST

## 2017-04-26 PROCEDURE — 74011000258 HC RX REV CODE- 258: Performed by: HOSPITALIST

## 2017-04-26 PROCEDURE — 77030018798 HC PMP KT ENTRL FED COVD -A

## 2017-04-26 PROCEDURE — 97110 THERAPEUTIC EXERCISES: CPT

## 2017-04-26 PROCEDURE — 74011250637 HC RX REV CODE- 250/637: Performed by: HOSPITALIST

## 2017-04-26 PROCEDURE — 83735 ASSAY OF MAGNESIUM: CPT | Performed by: INTERNAL MEDICINE

## 2017-04-26 PROCEDURE — 85025 COMPLETE CBC W/AUTO DIFF WBC: CPT | Performed by: HOSPITALIST

## 2017-04-26 PROCEDURE — 94760 N-INVAS EAR/PLS OXIMETRY 1: CPT

## 2017-04-26 PROCEDURE — 74011250636 HC RX REV CODE- 250/636: Performed by: FAMILY MEDICINE

## 2017-04-26 PROCEDURE — 65270000029 HC RM PRIVATE

## 2017-04-26 PROCEDURE — 85730 THROMBOPLASTIN TIME PARTIAL: CPT | Performed by: HOSPITALIST

## 2017-04-26 PROCEDURE — 74011250636 HC RX REV CODE- 250/636: Performed by: HOSPITALIST

## 2017-04-26 PROCEDURE — 36415 COLL VENOUS BLD VENIPUNCTURE: CPT | Performed by: HOSPITALIST

## 2017-04-26 RX ORDER — FAMOTIDINE 20 MG/1
20 TABLET, FILM COATED ORAL DAILY
Status: DISCONTINUED | OUTPATIENT
Start: 2017-04-27 | End: 2017-05-01 | Stop reason: HOSPADM

## 2017-04-26 RX ORDER — CLOPIDOGREL BISULFATE 75 MG/1
75 TABLET ORAL DAILY
Status: DISCONTINUED | OUTPATIENT
Start: 2017-04-27 | End: 2017-04-27

## 2017-04-26 RX ORDER — AMLODIPINE BESYLATE 10 MG/1
10 TABLET ORAL
Status: DISCONTINUED | OUTPATIENT
Start: 2017-04-26 | End: 2017-05-01 | Stop reason: HOSPADM

## 2017-04-26 RX ORDER — POTASSIUM CHLORIDE 20MEQ/15ML
40 LIQUID (ML) ORAL EVERY 4 HOURS
Status: COMPLETED | OUTPATIENT
Start: 2017-04-26 | End: 2017-04-26

## 2017-04-26 RX ORDER — LISINOPRIL 20 MG/1
40 TABLET ORAL DAILY
Status: DISCONTINUED | OUTPATIENT
Start: 2017-04-26 | End: 2017-05-01 | Stop reason: HOSPADM

## 2017-04-26 RX ADMIN — Medication 10 ML: at 05:52

## 2017-04-26 RX ADMIN — Medication 5 ML: at 14:00

## 2017-04-26 RX ADMIN — PIPERACILLIN SODIUM,TAZOBACTAM SODIUM 3.38 G: 3; .375 INJECTION, POWDER, FOR SOLUTION INTRAVENOUS at 22:01

## 2017-04-26 RX ADMIN — LEVOTHYROXINE SODIUM 50 MCG: 50 TABLET ORAL at 09:21

## 2017-04-26 RX ADMIN — PIPERACILLIN SODIUM,TAZOBACTAM SODIUM 3.38 G: 3; .375 INJECTION, POWDER, FOR SOLUTION INTRAVENOUS at 15:12

## 2017-04-26 RX ADMIN — Medication 10 ML: at 22:03

## 2017-04-26 RX ADMIN — POTASSIUM CHLORIDE 40 MEQ: 20 SOLUTION ORAL at 16:00

## 2017-04-26 RX ADMIN — POTASSIUM CHLORIDE 40 MEQ: 20 SOLUTION ORAL at 13:00

## 2017-04-26 RX ADMIN — LISINOPRIL 40 MG: 20 TABLET ORAL at 09:21

## 2017-04-26 RX ADMIN — AMIODARONE HYDROCHLORIDE 400 MG: 200 TABLET ORAL at 09:21

## 2017-04-26 RX ADMIN — AMLODIPINE BESYLATE 10 MG: 10 TABLET ORAL at 22:02

## 2017-04-26 RX ADMIN — PRAVASTATIN SODIUM 40 MG: 20 TABLET ORAL at 22:02

## 2017-04-26 RX ADMIN — METOPROLOL TARTRATE 50 MG: 50 TABLET ORAL at 09:21

## 2017-04-26 RX ADMIN — AMIODARONE HYDROCHLORIDE 400 MG: 200 TABLET ORAL at 22:02

## 2017-04-26 RX ADMIN — PIPERACILLIN SODIUM,TAZOBACTAM SODIUM 3.38 G: 3; .375 INJECTION, POWDER, FOR SOLUTION INTRAVENOUS at 05:52

## 2017-04-26 RX ADMIN — METOPROLOL TARTRATE 50 MG: 50 TABLET ORAL at 17:11

## 2017-04-26 RX ADMIN — HYDRALAZINE HYDROCHLORIDE 20 MG: 20 INJECTION INTRAMUSCULAR; INTRAVENOUS at 08:03

## 2017-04-26 RX ADMIN — ASPIRIN 81 MG: 81 TABLET, CHEWABLE ORAL at 09:21

## 2017-04-26 RX ADMIN — METOPROLOL TARTRATE 5 MG: 5 INJECTION INTRAVENOUS at 02:19

## 2017-04-26 NOTE — CONSULTS
[facility] STROKE/TRANSIENT ISCHEMIC ATTACK CONSULT NOTE    Patient ID:  Ap Romo  [id number]  80 y.o.  1935    Date of Consultation:  April 25, 2017    Referring Physician: Roshan Aleman    Reason for Consultation:  stroke    History of Present Illness:     Patient Active Problem List    Diagnosis Date Noted    Elevated troponin 04/24/2017    Cerebrovascular accident (CVA) due to occlusion of precerebral artery ([facility] 04/24/2017    Hypertensive urgency 04/22/2017    [facility]ox[facility] fibrillation ([facility] 04/22/2017    Hypothyroidism 04/04/2017    Anxiety 04/04/2017    Dementia 04/04/2017    Dizziness 04/04/2017    Hypertension 04/04/2017    Hyperlipidemia 04/04/2017    PVD (peripheral vascular disease) ([facility] 04/04/2017    Osteoarthritis 04/04/2017     Past Medical History:   Diagnosis Date    Anxiety 4/4/2017    --SEES DR. Jania Padilla    Dementia 4/4/2017    --SEEN NEUROLOGIST - DR. MELENDEZ -     Dizziness 4/4/2017    Hyperlipidemia 4/4/2017    Hypertension 4/4/2017    Hypothyroidism 4/4/2017    Osteoarthritis 4/4/2017    PVD (peripheral vascular disease) ([facility] 4/4/2017    --CAROTIDS      Past Surgical History:   Procedure Laterality Date    HX HYSTERECTOMY        Prior to Admission medications    Medication Sig Start Date End Date Taking? Authorizing Provider   aspirin delayed-release 81 mg tablet Take  by mouth daily. Historical Provider   levothyroxine (SYNTHROID) 50 mcg tablet Take  by mouth Daily (before breakfast). Historical Provider   metoprolol tartrate (LOPRESSOR) 50 mg tablet Take  by mouth daily. Historical Provider   calcium carbonate 500 mg calcium (1,250 mg) tab 500 mg, ergocalciferol (vitamin d2) 400 unit tab 200 Units Take  by mouth daily. Historical Provider   pravastatin (PRAVACHOL) 40 mg tablet Take 40 mg by mouth nightly.     Historical Provider     No Known Allergies   Social History   Substance Use Topics    Smoking status: Never Smoker    Smokeless tobacco: Not on file    Alcohol use Not on file      No family history on file. Subjective:      Ap Cerna is a 80 y.o. right handed female I have been asked to consult for evaluation of and treatment of weakness of the right arm and leg. Onset of symptoms was sudden, not related to any specific activity. Symptoms are currently of severe severity. Symptoms have lasted 3 days. Symptoms occur all day and last days. Stroke risk factors include hypertension, atrial fibrillation or hyperlipidemia. Prior stroke history: no. She also complains of nothing. She denies nothing. She was unable to provide history due to severe dysarthria. She could nod and shake her head appropriately to yes/no questions. Outside reports reviewed: none. Review of Systems:    Review of systems not obtained due to patient factors. Objective:     Patient Vitals for the past 8 hrs:   BP Temp Pulse Resp SpO2   04/25/17 2021 (!) 154/99 99 °F (37.2 °C) 77 16 96 %   04/25/17 1700 189/80 97 °F (36.1 °C) 70 16 97 %         Visit Vitals    BP (!) 154/99 (BP 1 Location: Right leg, BP Patient Position: At rest)    Pulse 77    Temp 99 °F (37.2 °C)    Resp 16    Ht 5' (1.524 m)    Wt 50.2 kg (110 lb 11.2 oz)    SpO2 96%    BMI 21.62 kg/m2     Lungs: clear to auscultation bilaterally  Heart: regular rate and rhythm, S1, S2 normal, no murmur, click, rub or gallop  Neurologic: Mental status: She appeared to be asleep, but was able to nod yes/no and follow simple commands with the left. More detailed mental status examination was limited due to severe dysarthria, almost to an unintelligible degree. Cranial nerves: She could not open her right eye, and I could barely see the left when she attempted to open it. When I opened her lids, she had conjugate lateral and upgaze, but could not converge or look down well. Pupils are 2 mm and reactive to light. There is right facial paresis and normal sensory functioning.   Sensory: No obvious asymmetry to temperature, light touch or vibration in the arms and legs. Motor: She had increased tone and 0/5 power in the right arm, with the exception of a flicker of hand  movement. In the right leg, she could move it slightly, but not against force or gravity. She could elevate the left arm and leg easily against gravity, and her left leg resisted downward pressure (4+/5). Reflexes: 2+ and symmetric in the biceps, 3+ on the right and 2+ at the left patella. Absent ankle jerks. Toes mute to plantar stimulation. Coordination: Unable to perform maneuvers on the right. Gait: not tested    Imaging    MRI Brain:  IMPRESSION:  1. Acute moderate sized left basal ganglia CVA. 2. No acute hemorrhage. 2. Moderate to severe chronic white matter microangiopathic disease. MRA Brain:  IMPRESSION:  1. Diffuse atherosclerotic changes most notable within the posterior circulation  in which the distal vertebral arteries and basilar artery as well as the  posterior cerebral arteries demonstrate either very faint to absent signal. This  would indicate either partial occlusive disease versus extremely slow flow due  to atherosclerotic changes    MRA Neck:  IMPRESSION:  1. 50% narrowing of the origin of the right internal carotid artery. 2. 60-65% narrowing at the origin of the left internal carotid artery  3. Bilateral significant narrowing at the origins of the external carotid  arteries. 4. Significant posterior circulation disease. Echocardiogram:  SUMMARY:    -  Left ventricle: Systolic function was hyperdynamic. Ejection fraction was  estimated in the range of 70 % to 75 %. There were no regional wall motion  abnormalities. -  Left atrium: The atrium was moderately dilated. -  Atrial septum: Contrast injection was performed. There was no   right-to-left  shunt, with provocative maneuvers to increase right atrial pressure.    -  Right atrium: The atrium was mildly dilated.     Lab Review    Recent Results (from the past 24 hour(s))   C. DIFFICILE/EPI PCR    Collection Time: 04/24/17 11:45 PM   Result Value Ref Range    Special Requests: NO SPECIAL REQUESTS      Culture result: NEGATIVE       Culture result:        Toxigenic C. diff NEGATIVE/ [id number] PRESUMPTIVE NEGATIVE   CBC W/O DIFF    Collection Time: 04/25/17  8:05 AM   Result Value Ref Range    WBC 10.9 4.3 - 11.1 K/uL    RBC 5.34 (H) 4.05 - 5.25 M/uL    HGB 15.9 (H) 11.7 - 15.4 g/dL    HCT 47.7 (H) 35.8 - 46.3 %    MCV 89.3 79.6 - 97.8 FL    MCH 29.8 26.1 - 32.9 PG    MCHC 33.3 31.4 - 35.0 g/dL    RDW 14.0 11.9 - 14.6 %    PLATELET 238 (L) 203 - 450 K/uL    MPV 14.1 10.8 - 00.6 FL   METABOLIC PANEL, BASIC    Collection Time: 04/25/17  8:05 AM   Result Value Ref Range    Sodium 148 (H) 136 - 145 mmol/L    Potassium 3.4 (L) 3.5 - 5.1 mmol/L    Chloride 111 (H) 98 - 107 mmol/L    CO2 25 21 - 32 mmol/L    Anion gap 12 7 - 16 mmol/L    Glucose 106 (H) 65 - 100 mg/dL    BUN 29 (H) 8 - 23 MG/DL    Creatinine 0.74 0.6 - 1.0 MG/DL    GFR est AA >60 >60 ml/min/1.73m2    GFR est non-AA >60 >60 ml/min/1.73m2    Calcium 8.4 8.3 - 10.4 MG/DL   PTT    Collection Time: 04/25/17  8:05 AM   Result Value Ref Range    aPTT 29.6 23.5 - 31.7 SEC         Assessment:     Principal Problem:    Cerebrovascular accident (CVA) due to occlusion of precerebral artery ([address] (4/24/2017)    Active Problems:    Hypertensive urgency (4/22/2017)      Paroxysmal atrial fibrillation (HCC) (4/22/2017)      Elevated troponin (4/24/2017)    Severe intracranial vascular disease. Marked hyperlipidemia. The intracranial and extracranial vascular disease is often managed with dual anti-platelet therapy, yet PAF is most appropriately managed by full anticoagulation. Given that anticoagulation will probably need to wait for 1-2 weeks to reduce the risk of hemorrhagic transformation, a staged approach to therapy is probably warranted. Plan:     1.   ASA and Plavix or Brillinta x 2 weeks to promote clot stabilization in the intracranial and extracranial vasculature. Although it's unclear if this is the etiology of her stroke (it is more likely to be cardioembolic), this is reasonable as a short term strategy. 2.  Beginning on day 14 of anti-platelet therapy, I would switch to full anticoagulation. If coumadin is chosen rather than a NoAC, then the anti-platelet therapy could be continued until the INR is within the target range of 2.5 to 3.0.  3.  Permissive hypertension is reasonable, given the severity of her intracranial vascular disease. I would allow SBP in the 140-180 range for the first 2 weeks, and then gradually tighten control into the 120-140 SBP range. Thank you.     Gerald Juarez MD

## 2017-04-26 NOTE — PROGRESS NOTES
TRANSFER - IN REPORT:    Verbal report received from [facility] [address]  being received from [facility] for routine progression of care      Report consisted of patients Situation, Background, Assessment and   Recommendations(SBAR). Information from the following report(s) SBAR, Intake/Output, MAR and Recent Results was reviewed with the receiving nurse. Opportunity for questions and clarification was provided. Assessment to be completed upon patients arrival to unit and care assumed.

## 2017-04-26 NOTE — PROGRESS NOTES
[facility] CARDIOLOGY PROGRESS NOTE           4/26/2017 8:33 AM    Admit Date: 4/22/2017      Subjective:   Patient difficult to arouse. Unable to respond to questions. In sinus rhythm. BP elevated. ROS:  Cardiovascular:  As noted above    Objective:      Vitals:    04/25/17 1700 04/25/17 2021 04/26/17 0144 04/26/17 0538   BP: 189/80 (!) 154/99 169/84 170/88   Pulse: 70 77 89 91   Resp: 16 16 16 18   Temp: 97 °F (36.1 °C) 99 °F (37.2 °C) 98.2 °F (36.8 °C) 98.4 °F (36.9 °C)   SpO2: 97% 96% 97% 98%   Weight:    52.2 kg (115 lb)   Height:           Physical Exam:  General-No Acute Distress  Neck- supple, no JVD  CV- regular rate and rhythm no MRG  Lung- clear bilaterally  Abd- soft, nontender, nondistended  Ext- no edema bilaterally. Skin- warm and dry      Data Review:   Recent Labs      04/26/17   0430  04/25/17   0805   NA  143  148*   K  3.1*  3.4*   BUN  25*  29*   CREA  0.66  0.74   GLU  128*  106*   WBC  9.7  10.9   HGB  16.5*  15.9*   HCT  47.6*  47.7*   PLT  120*  127*        No results found for: TROIQ, VINH, TROPT, TNIPOCEcho (4/22/17):  -  Left ventricle: Systolic function was hyperdynamic. Ejection fraction was estimated in the range of 70 % to 75 %. There were no regional wall motion abnormalities. -  Left atrium: The atrium was moderately dilated. -  Atrial septum: Contrast injection was performed. There was no right-to-left shunt, with provocative maneuvers to increase right atrial pressure. -  Right atrium: The atrium was mildly dilated. Assessment/Plan:     Principal Problem:    Cerebrovascular accident (CVA) due to occlusion of precerebral artery ([address] (4/24/2017)  Paroxysmal atrial fibrillation. In sinus on amiodarone. See neurology notes in regards to anti-platelet therapy and anticoagulation. Defer to these recommendation. Long term needs indefinite anticoagulation. Active Problems:    Hypertensive urgency (4/22/2017)  BP elevated. Increase norvasc to 10. Paroxysmal atrial fibrillation (HCC) (4/22/2017)  On PO amiodarone. Plan 400 mg PO BID for 2 weeks then 200 mg BID for 2 weeks then 200 mg a day. Anticoagulation to start in 2 weeks per neurology. Elevated troponin (4/24/2017)  Demand ischemia. Normal EF. No plans for ischemia evaluation. On ASA and statin. Cardiac condition stable. Will sign off. Please call with any questions or clinical changes. Appreciate consultation.                   Guzman Wallace MD  4/26/2017 8:33 AM

## 2017-04-26 NOTE — PROGRESS NOTES
Problem: Falls - Risk of  Goal: *Absence of falls  Outcome: Progressing Towards Goal  Fall precautions in place. Bed alarm on. Yellow socks on. Instructed to only get OOB with assistance. Voiced understanding. Call light in reach. Goal: *Knowledge of fall prevention  Outcome: Progressing Towards Goal  Pt educated on fall prevention and to use call light for assistance. Pt verbalizes understanding and will use call light for assistance.          Problem: Pressure Ulcer - Risk of  Goal: *Prevention of pressure ulcer  Outcome: Progressing Towards Goal    04/25/17 2030   Wound Prevention and Protection Methods   Orientation of Wound Prevention Mid;Posterior   Location of Wound Prevention Sacrum/Coccyx   Dressing Present  Intact, not due to be changed   Dressing Status Intact   Read Only, Retired: Wound Treatment (non-mechanical)   Wound Offloading (Prevention Methods) Bed, pressure reduction mattress;Pillows;Repositioning;Turning

## 2017-04-26 NOTE — PROGRESS NOTES
Problem: Dysphagia (Adult)  Goal: *Acute Goals and Plan of Care (Insert Text)  ST. Pt. Will tolerate PO trials of diet upgrades with SLP at bedside with no overt s/sx of aspiration/penetration. Goal updated 2017   2. Patient will complete bedside swallow evaluation with 100% participation. 3. Pt. Will participate in speech/language/cognitive evaluation with 100% participation. 4. Patient will tolerate puree/honey thick liquids/no straws with no overt s/sx of aspiration/penetration. Goal added 2017    LTG: Pt. Will tolerate least restrictive diet without respiratory decline. SPEECH LANGUAGE PATHOLOGY: BEDSIDE SWALLOW NOTE: Daily Note 3     NAME/AGE/GENDER: Ap Pedraza is a 80 y.o. female  DATE: 2017  PRIMARY DIAGNOSIS: TIA (transient ischemic attack)  Hypertensive urgency  TIA (transient ischemic attack)  Hypertensive urgency       ICD-10: Treatment Diagnosis: R13.12 Dysphagia, Oropharyngeal Phase     INTERDISCIPLINARY COLLABORATION: Registered Nurse  PRECAUTIONS/ALLERGIES: Review of patient's allergies indicates no known allergies. ASSESSMENT:   Patient seen for dysphagia treatment. NG in place. Patient is sitting up in the chair. Requires a lot of encouragement to continue to participate repeatedly stating that she wants to \"go home\". Prominent right facial droop. Completed oral motor exercises outlined below x5 each with moderate cues. Decreased lingual strength during resistance exercises. Patient did put more effort into overarticulating when a direct model was provided with exaggerated vowels during structured tasks but without carryover to conversational speech and mod-severe dysarthria. Recommend continue NPO. Will follow for dysphagia, dysarthria, and cognitive tx. Patient will benefit from skilled intervention to address the below impairments. ?????? ? ? This section established at most recent assessment??????????  PROBLEM LIST (Impairments causing functional limitations):  1. dysphagia  REHABILITATION POTENTIAL FOR STATED GOALS: FAIR      PLAN OF CARE:   Patient will benefit from skilled intervention to address the following impairments. RECOMMENDATIONS AND PLANNED INTERVENTIONS (Benefits and precautions of therapy have been discussed with the patient.):  · NPO  MEDICATIONS:  · Non-oral  COMPENSATORY STRATEGIES/MODIFICATIONS INCLUDING:  ·   OTHER RECOMMENDATIONS (including follow up treatment recommendations): · Family training/education  · Patient education  RECOMMENDED DIET MODIFICATIONS DISCUSSED WITH:  · Nursing  · Patient  FREQUENCY/DURATION: Continue to follow patient 2 times a week as able or until goals met. RECOMMENDED REHABILITATION/EQUIPMENT: (at time of discharge pending progress):   Continue Skilled Therapy and Rehab. SUBJECTIVE:   Increased alertness/bolus acceptance, however +s/sx of aspiration. Tells me her daughter was here \"earlier. \" Answers some basic y/n questions. History of Present Injury/Illness: Ms. Fly Conner  has a past medical history of Anxiety (4/4/2017); Dementia (4/4/2017); Dizziness (4/4/2017); Hyperlipidemia (4/4/2017); Hypertension (4/4/2017); Hypothyroidism (4/4/2017); Osteoarthritis (4/4/2017); and PVD (peripheral vascular disease) ([facility] (4/4/2017). .  She also  has a past surgical history that includes hysterectomy. Present Symptoms:    Pain Intensity 1: 0  Current Medications:   No current facility-administered medications on file prior to encounter. Current Outpatient Prescriptions on File Prior to Encounter   Medication Sig Dispense Refill    aspirin delayed-release 81 mg tablet Take  by mouth daily.  levothyroxine (SYNTHROID) 50 mcg tablet Take  by mouth Daily (before breakfast).  metoprolol tartrate (LOPRESSOR) 50 mg tablet Take  by mouth daily.  calcium carbonate 500 mg calcium (1,250 mg) tab 500 mg, ergocalciferol (vitamin d2) 400 unit tab 200 Units Take  by mouth daily.       pravastatin (PRAVACHOL) 40 mg tablet Take 40 mg by mouth nightly. · Current Dietary Status:  NPO  ·   History of reflux:  NO  Social History/Home Situation:    Home Environment: Private residence  One/Two Story Residence: Two story  Living Alone: No  Support Systems: Child(keysha), Family member(s)  Patient Expects to be Discharged to[de-identified] Rehabilitation facility  Current DME Used/Available at Home: None      OBJECTIVE:   Respiratory Status:  Room air  0 l/min  CXR Results:IMPRESSION: No acute abnormality. MRI Pending  CT Results:IMPRESSION:   1. No definite acute intracranial abnormality. Note, acute/subacute CVA cannot  be excluded secondary to the severity of the underlying white matter disease. 2. Severe chronic white matter microangiopathic disease with moderate bilateral  atrophy. Oral Motor Structure/Speech:  Oral-Motor Structure/Motor Speech  Labial: Decreased rate, Decreased seal, Right droop  Dentition: Edentulous  Oral Hygiene: dry  Lingual: Decreased rate, Decreased strength, Incoordinated     Cognitive and Communication Status:  Neurologic State: Eyes open to stimulus  Orientation Level: Oriented to person  Cognition: Follows commands              BEDSIDE SWALLOW EVALUATION  Oral Assessment:     P.O. Trials:         The patient was given bite/sip amounts of the following:            ORAL PHASE:                    PHARYNGEAL PHASE:                             OTHER OBSERVATIONS:  Rate/bite size: Impaired   Endurance:  Impaired      Comments: not appropriate for further trials     Tool Used: Dysphagia Outcome and Severity Scale (BLANCHE)     Score Comments   Normal Diet  [ ] 7 With no strategies or extra time needed   Functional Swallow  [ ] 6 May have mild oral or pharyngeal delay         Mild Dysphagia     [ ] 5 Which may require one diet consistency restricted (those who demonstrate penetration which is entirely cleared on MBS would be included)   Mild-Moderate Dysphagia  [ ] 4 With 1-2 diet consistencies restricted         Moderate Dysphagia  [ ] 3 With 2 or more diet consistencies restricted         Moderately Severe Dysphagia  [X] 2 With partial PO strategies (trials with ST only)         Severe Dysphagia  [ ] 1 With inability to tolerate any PO safely            Score:  Initial: 2 Most Recent: X (Date: -- )   Interpretation of Tool: The Dysphagia Outcome and Severity Scale (BLANCHE) is a simple, easy-to-use, 7-point scale developed to systematically rate the functional severity of dysphagia based on objective assessment and make recommendations for diet level, independence level, and type of nutrition. Score 7 6 5 4 3 2 1   Modifier CH CI CJ CK CL CM CN   · Swallowing:               - CURRENT STATUS:           CM - 80%-99% impaired, limited or restricted               - GOAL STATUS:                   CJ - 20%-39% impaired, limited or restricted               - D/C STATUS:                       ---------------To be determined---------------  [place] / [place] / Product Type: Medicare /       TREATMENT:         (In addition to Assessment/Re-Assessment sessions the following treatments were rendered)  Dysphagia Activities: Activities/Procedures listed utilized to improve progress in swallow strength, swallow timeliness, swallow function, diet tolerance and swallow safety. Required moderate to max cueing to improve swallow safety, work toward diet advancement and decrease aspiration risk.   MODALITIES:                                                                     ORAL MOTOR  EXERCISES:  Exaggerate Vowels: Yes  Reps: 5  Sets : 1                             Labial Pucker: Yes  Reps: 5  Sets : 1                                                       Lingual Lateralization-Exterior: Yes  Reps : 5  Sets: 1           Lingual Protrusion: Yes  Reps : 5  Sets : 1  Lingual Resistance: Yes  Reps : 5  Sets : 1           \"OO-EE\": Yes  Reps: 5  Sets : 1 LARYNGEAL / PHARYNGEAL EXERCISES:                                                                                                                                      __________________________________________________________________________________________________  Safety:   After treatment position/precautions:  · Call light within reach  · RN notified  · Upright in Bed  Treatment Assessment:  Patient required mod cues to participate in PO trials. Progression/Medical Necessity:   · Skilled intervention continues to be required due to persistent signs and symptoms of aspiration. Compliance with Program/Exercises: Will assess as treatment progresses. Reason for Continuation of Services/Other Comments:  · Patient continues to require skilled intervention due to medical complications and patient unable to attend/participate in therapy as expected. Recommendations/Intent for next treatment session: \"Treatment next visit will focus on trial PO with SLP only.   Total Treatment Duration:  Time In: 1323  Time Out: 7 Polly Guthrie MS, SLP

## 2017-04-26 NOTE — PROGRESS NOTES
Problem: Mobility Impaired (Adult and Pediatric)  Goal: *Therapy Goal (Edit Goal, Insert Text)  Right sided hemiplegia  STG:  (1.)Ms. Nikolas Severino will move from supine to sit and sit to supine , scoot up and down and roll side to side with MODERATE ASSIST within 7 day(s). (2.)Ms. Nikolas Severino will transfer from bed to chair and chair to bed with MODERATE ASSIST using the least restrictive device within 7 day(s). (3.)Ms. Nikolas Severino will ambulate with MAXIMAL ASSIST for 5 feet with the least restrictive device within 7 day(s). LTG:  (1.)Ms. Nikolas Severino will move from supine to sit and sit to supine , scoot up and down and roll side to side in bed with MINIMAL ASSIST within 14 day(s). (2.)Ms. Nikolas Severino will transfer from bed to chair and chair to bed with MINIMAL ASSIST using the least restrictive device within 14 day(s). (3.)Ms. Nikolas Severino will ambulate with MODERATE ASSIST for 25 feet with the least restrictive device within 14 day(s). ________________________________________________________________________________________________       PHYSICAL THERAPY: Daily Note, Treatment Day: 2nd and AM 4/26/2017  INPATIENT: Hospital Day: 5  [place] / Product Type: Medicare /      NAME/AGE/GENDER: Ap Severino is a 80 y.o. female         PRIMARY DIAGNOSIS: TIA (transient ischemic attack)  Hypertensive urgency  TIA (transient ischemic attack)  Hypertensive urgency Cerebrovascular accident (CVA) due to occlusion of precerebral artery ([address] Cerebrovascular accident (CVA) due to occlusion of precerebral artery ([address]        ICD-10: Treatment Diagnosis:       · Paralytic gait (R26.1)  · Hemiplegia and hemiparesis following cerebral infarction affecting right dominant side (G18.710)   Precaution/Allergies:  Review of patient's allergies indicates no known allergies. ASSESSMENT:      Ms. Nikolas Severino presents with right sided hemiplegia lying in bed and dysarthric. Ge BERRY UE.   Educated nursing to keep R UE propped on pillow, put positioning sheet on wall with illustration. R LE with extensor hypertonicity. Performed exercises in supine, required mod verbal and tactile cueing to follow commands. Transitioned to sit with max assist of 2. Better balance today with trunk in midline, only required min to CGA to maintain balance. Worked on sitting balance, then sit to stand. Patient stood with max assist of 2 transferred to chair. Patient demonstrated progress with sitting balance and transfer today. Skilled PT is indicated for patient safety and mobility progression during current acute care episode. This section established at most recent assessment   PROBLEM LIST (Impairments causing functional limitations):  1. Decreased Strength  2. Decreased ADL/Functional Activities  3. Decreased Transfer Abilities  4. Decreased Ambulation Ability/Technique  5. Decreased Balance  6. Decreased Activity Tolerance  7. Decreased Pacing Skills  8. Decreased Flexibility/Joint Mobility  9. Decreased [place] with Home Exercise Program    INTERVENTIONS PLANNED: (Benefits and precautions of physical therapy have been discussed with the patient.)  1. Balance Exercise  2. Bed Mobility  3. Family Education  4. Gait Training  5. Home Exercise Program (HEP)  6. Range of Motion (ROM)  7. Therapeutic Activites  8. Therapeutic Exercise/Strengthening  9. Transfer Training  10. Neuromuscular reeducation      TREATMENT PLAN: Frequency/Duration: 3 times a week for duration of hospital stay  Rehabilitation Potential For Stated Goals: GOOD      RECOMMENDED REHABILITATION/EQUIPMENT: (at time of discharge pending progress): Continue Skilled Therapy and and to be determined at medical discharge.                      HISTORY:   History of Present Injury/Illness (Reason for Referral):  PER MD H&P  Patient is 79 y/o  female with pmhx of dyslipidemia, HTN, hypothyroidism, osteoarthritis, PVD who presented to ER in view of right sided weakness, lethargy and drowsiness. Patient was evaluated in ER in presence of patient's daughter. According to the daughter (primary history provider as patient is drowsy), her mother was in usual health until last night. This morning when she went to check on her mother, she looked very weak and complained of right sided weakness. She also wasn't answering questions appropriately. At baseline, she is pretty much active, independent with ADL. ROS could not be obtained, but patient denied having any chest pain or shortness of breath. In the ER, she also had new onset A. Fib with RVR, for which she was started on cardiazem drip after receiving a bolus. Her blood pressure was elevated SBP > 210, DBP > 100, which came down after one dose of IV hydralazine. During A.fib, she started frothing from the mouth, but no seizure like activity was noticed. Past Medical History/Comorbidities:   Ms. Sherry Freitas  has a past medical history of Anxiety (4/4/2017); Dementia (4/4/2017); Dizziness (4/4/2017); Hyperlipidemia (4/4/2017); Hypertension (4/4/2017); Hypothyroidism (4/4/2017); Osteoarthritis (4/4/2017); and PVD (peripheral vascular disease) [address] [address] (4/4/2017). Ms. Sherry Freitas  has a past surgical history that includes hysterectomy. Social History/Living Environment:   Home Environment: Private residence  One/Two Story Residence: Two story  Living Alone: No  Support Systems: Child(keysha), Family member(s)  Patient Expects to be Discharged to[de-identified] Rehabilitation facility  Current DME Used/Available at Home: None Per patient daughter, patient lives with her and walk around the house only. Prior Level of Function/Work/Activity:  Limited to in house ambulation and mobility only per patient   Dominant Side:         RIGHT  Personal Factors:          Sex:  female        Age:  80 y.o.    Number of Personal Factors/Comorbidities that affect the Plan of Care: 1-2: MODERATE COMPLEXITY   EXAMINATION:   Most Recent Physical Functioning:   Gross Assessment:                  Posture:     Balance:  Sitting: Impaired  Sitting - Static: Fair (occasional)  Sitting - Dynamic: Poor (constant support)  Standing: Impaired  Standing - Static: Constant support  Standing - Dynamic : Poor Bed Mobility:  Rolling: Minimum assistance; Additional time  Supine to Sit: Maximum assistance;Assist x2  Scooting: Maximum assistance;Assist x2  Duration: 28 Minutes  Wheelchair Mobility:     Transfers:  Sit to Stand: Maximum assistance;Assist x2  Stand to Sit: Maximum assistance;Assist x2  Bed to Chair: Maximum assistance;Assist x2  Gait:     Base of Support: Widened  Speed/Radha: Slow;Shuffled  Step Length: Right shortened;Left shortened  Gait Abnormalities: Shuffling gait  Distance (ft): 3 Feet (ft)  Assistive Device: Gait belt  Ambulation - Level of Assistance: Maximum assistance;Assist x2       Body Structures Involved:  1. Metabolic  2. Bones  3. Joints  4. Muscles  5. Ligaments Body Functions Affected:  1. Neuromusculoskeletal  2. Movement Related  3. Skin Related  4. Metobolic/Endocrine Activities and Participation Affected:  1. General Tasks and Demands  2. Mobility  3. Self Care  4. Community, Social and [place]   Number of elements that affect the Plan of Care: 4+: HIGH COMPLEXITY   CLINICAL PRESENTATION:   Presentation: Evolving clinical presentation with changing clinical characteristics: MODERATE COMPLEXITY   CLINICAL DECISION MAKIN [facility] Inpatient Short Form  How much difficulty does the patient currently have. .. Unable A Lot A Little None   1. Turning over in bed (including adjusting bedclothes, sheets and blankets)? [ ] 1   [X] 2   [ ] 3   [ ] 4   2. Sitting down on and standing up from a chair with arms ( e.g., wheelchair, bedside commode, etc.)   [ ] 1   [X] 2   [ ] 3   [ ] 4   3. Moving from lying on back to sitting on the side of the bed?    [ ] 1   [X] 2   [ ] 3   [ ] 4   How much help from another person does the patient currently need. .. Total A Lot A Little None   4. Moving to and from a bed to a chair (including a wheelchair)? [X] 1   [ ] 2   [ ] 3   [ ] 4   5. Need to walk in hospital room? [X] 1   [ ] 2   [ ] 3   [ ] 4   6. Climbing 3-5 steps with a railing? [X] 1   [ ] 2   [ ] 3   [ ] 4   © 2007, Trustees of [address] [place] [address] [address] [address], under license to [employer] All rights reserved    Score:  Initial: 9 Most Recent: X (Date: -- )     Interpretation of Tool:  Represents activities that are increasingly more difficult (i.e. Bed mobility, Transfers, Gait). Score 24 23 22-20 19-15 14-10 9-7 6       Modifier CH CI CJ CK CL CM CN         · Mobility - Walking and Moving Around:               - CURRENT STATUS:    CM - 80%-99% impaired, limited or restricted               - GOAL STATUS:           CL - 60%-79% impaired, limited or restricted               - D/C STATUS:                       ---------------To be determined---------------  [place] / Product Type: Medicare /       Medical Necessity:     · Patient demonstrates fair rehab potential due to higher previous functional level. Reason for Services/Other Comments:  · Patient continues to require skilled intervention due to medical complications, patient unable to attend/participate in therapy as expected and s/p CVA affecting right side. Use of outcome tool(s) and clinical judgement create a POC that gives a: Questionable prediction of patient's progress: MODERATE COMPLEXITY                 TREATMENT:   (In addition to Assessment/Re-Assessment sessions the following treatments were rendered)   Pre-treatment Symptoms/Complaints:  0/10 at this time.     Pain: Initial:   Pain Intensity 1: 0  Post Session:  0/10     Therapeutic Activity: (28 Minutes   ):  Therapeutic activities including Bed transfers, Ambulation on level ground and sitting balance and sit to stand to improve mobility, strength and balance. Required moderate   to promote static and dynamic balance in sitting and promote motor control of bilateral, lower extremity(s). Performed heel slides L LE with max cueing. Therapeutic Exercise: (  10):  Exercises per grid below to improve mobility, strength and coordination. Required moderate visual and verbal cues to promote proper body alignment. Progressed complexity of movement as indicated. Educated staff on positioning R UE with pillow for support. DATE: 9/26/17        Straight leg raise         Hip abduct/ adduct 2x5 AAL        Heel slides  2x5 AL, RI        Hip external/ internal rotation         Ankle dorsiflexion/ plantarflexion                                      Key:  A=active, AA=active assisted, P=passive, B=bilaterally, R=right, L=left                      Braces/Orthotics/Lines/Etc:   · mantilla catheter and nasogastric tube   · Mitten L UE  Treatment/Session Assessment:    · Response to Treatment: participated with decreased attention and command following  · Interdisciplinary Collaboration:  · Physical Therapist, Occupational Therapist, Registered Nurse, Rehabilitation Attendant and Certified Nursing Assistant/Patient Care Technician  · After treatment position/precautions:  · Up in chair, Bed/Chair-wheels locked, Bed in low position, Call light within reach and RN notified  · Compliance with Program/Exercises: Will assess as treatment progresses. · Recommendations/Intent for next treatment session: \"Next visit will focus on advancements to more challenging activities, reduction in assistance provided and neuromuscular reeducation. \".   Total Treatment Duration:  PT Patient Time In/Time Out  Time In: 1040  Time Out: BALWINDER Owen

## 2017-04-26 NOTE — PROGRESS NOTES
Problem: Self Care Deficits Care Plan (Adult)  Goal: *Acute Goals and Plan of Care (Insert Text)  1. Patient will complete upper body bathing and grooming with minimal assist and adaptive equipment as needed. 2. Patient will complete toileting with moderate assist.  3. Patient will tolerate 20 minutes of OT treatment with 1-2 rest breaks to increase activity tolerance for ADLs. 4. Patient will complete functional bed mobility with moderate assist and adaptive equipment as needed. 5. Patient will demonstrate sitting edge of bed with CGA to increase ADLS and precursor for transfers. Timeframe: 7 visits       OCCUPATIONAL THERAPY: Daily Note and Treatment Day: 1st 4/26/2017  INPATIENT: Hospital Day: 5  [place] / Product Type: Medicare /      NAME/AGE/GENDER: Ap Burk is a 80 y.o. female         PRIMARY DIAGNOSIS:  TIA (transient ischemic attack)  Hypertensive urgency  TIA (transient ischemic attack)  Hypertensive urgency Cerebrovascular accident (CVA) due to occlusion of precerebral artery ([address] Cerebrovascular accident (CVA) due to occlusion of precerebral artery ([address]        ICD-10: Treatment Diagnosis:        · Generalized Muscle Weakness (M62.81)  · Other lack of cordination (R27.8)   Precautions/Allergies:        FALLS Review of patient's allergies indicates no known allergies. ASSESSMENT:      Ms. Rayray Burk presents with right sided weakness with flaccid right UE, right inattention, and visual deficits on right eye. Pt s/p MRI and found to have an acute left sided basal ganglia stroke. Pt with decreased mobility and ADLS and will likely require extensive therapy to address deficits. 04/26/2017: Pt presents sitting in chair, agreeable to therapy. Language barrier is present but pt is able to follow most commands. Pt requires minimal assistance to wash face.   At first, pt appeared confused putting the facecloth to her face with LUE and then dropping in lap before proceeding to use only her hand. Replaced facecloth and initiated with hand over hand prompting, pt then able to complete. Pt attempted to use RUE during hand washing assisted by left then completed by therapist.  Pt transferred back to bed with maximal assist x 1 after joint compression to all extremities to increase proprioception/body awareness. She attempted weight bearing through both lower extremities. Cont. OT per POC    This section established at most recent assessment   PROBLEM LIST (Impairments causing functional limitations):  1. Decreased Strength  2. Decreased ADL/Functional Activities  3. Decreased Transfer Abilities  4. Decreased Ambulation Ability/Technique  5. Decreased Balance  6. Decreased Activity Tolerance  7. Decreased Pacing Skills  8. Increased Fatigue  9. Decreased Flexibility/Joint Mobility  10. Decreased Cognition    INTERVENTIONS PLANNED: (Benefits and precautions of occupational therapy have been discussed with the patient.)  1. Activities of daily living training  2. Adaptive equipment training  3. Balance training  4. Clothing management  5. Cognitive training  6. Donning&doffing training  7. Hygiene training  8. Neuromuscular re-eduation  9. Theraputic activity  10. Theraputic exercise      TREATMENT PLAN: Frequency/Duration: Follow patient 3 x/ week to address above goals. Rehabilitation Potential For Stated Goals: FAIR      RECOMMENDED REHABILITATION/EQUIPMENT: (at time of discharge pending progress): Continue Skilled Therapy. OCCUPATIONAL PROFILE AND HISTORY:   History of Present Injury/Illness (Reason for Referral):  See H & P  Past Medical History/Comorbidities:   Ms. Cb Campbell  has a past medical history of Anxiety (4/4/2017); Dementia (4/4/2017); Dizziness (4/4/2017); Hyperlipidemia (4/4/2017); Hypertension (4/4/2017); Hypothyroidism (4/4/2017); Osteoarthritis (4/4/2017); and PVD (peripheral vascular disease) ([facility] [address] (4/4/2017).   Ms. Cb Campbell has a past surgical history that includes hysterectomy. Social History/Living Environment:   Home Environment: Private residence  One/Two Story Residence: Two story  Living Alone: No  Support Systems: Child(keysha), Family member(s)  Patient Expects to be Discharged to[de-identified] Rehabilitation facility  Current DME Used/Available at Home: None  Prior Level of Function/Work/Activity:  No family present to assist with prior level of function history      Number of Personal Factors/Comorbidities that affect the Plan of Care: Expanded review of therapy/medical records (1-2):  MODERATE COMPLEXITY   ASSESSMENT OF OCCUPATIONAL PERFORMANCE[de-identified]   Activities of Daily Living:           Basic ADLs (From Assessment) Complex ADLs (From Assessment)   Basic ADL  Feeding: Maximum assistance, Additional time  Oral Facial Hygiene/Grooming: Maximum assistance, Additional time  Bathing: Maximum assistance, Additional time, Adaptive equipment  Upper Body Dressing: Maximum assistance, Additional time  Lower Body Dressing: Maximum assistance, Additional time  Toileting: Adaptive equipment, Maximum assistance, Additional time     Grooming/Bathing/Dressing Activities of Daily Living   Grooming  Grooming Assistance: Moderate assistance  Washing Face: Minimum assistance  Washing Hands: Moderate assistance Cognitive Retraining  Safety/Judgement: Fall prevention;Decreased insight into deficits; Decreased awareness of need for safety                       Bed/Mat Mobility  Rolling: Minimum assistance; Additional time  Supine to Sit: Maximum assistance;Assist x2  Sit to Stand: Maximum assistance;Assist x2  Bed to Chair: Maximum assistance;Assist x2  Scooting: Maximum assistance;Assist x2          Most Recent Physical Functioning:   Gross Assessment:                  Posture:  Posture (WDL): Exceptions to WDL  Posture Assessment: Cervical, Forward head  Balance:  Sitting: Impaired  Sitting - Static: Fair (occasional)  Sitting - Dynamic: Poor (constant support)  Standing: Impaired  Standing - Static: Constant support  Standing - Dynamic : Poor Bed Mobility:  Rolling: Minimum assistance; Additional time  Supine to Sit: Maximum assistance;Assist x2  Scooting: Maximum assistance;Assist x2  Duration: 28 Minutes  Wheelchair Mobility:     Transfers:  Sit to Stand: Maximum assistance;Assist x2  Stand to Sit: Maximum assistance;Assist x2  Bed to Chair: Maximum assistance;Assist x2                 Patient Vitals for the past 6 hrs:   BP BP Patient Position SpO2 O2 Flow Rate (L/min) Pulse   17 1127 158/50 - - - -   17 1148 - - 95 % 0 l/min -   17 1149 161/68 At rest 96 % - 73        Mental Status  Neurologic State: Alert  Orientation Level: Oriented to person  Cognition: Decreased attention/concentration, Follows commands  Perception: Appears intact  Perseveration: No perseveration noted  Safety/Judgement: Fall prevention, Decreased insight into deficits, Decreased awareness of need for safety                               Physical Skills Involved:  1. Range of Motion  2. Balance  3. Mobility  4. Strength  5. Endurance  6. Fine or Gross Motor Coordination  7. Sensation Cognitive Skills Affected (resulting in the inability to perform in a timely and safe manner):  1. Attending  2. Understanding  3. Problem Solving  4. Learning  5. Remembering Psychosocial Skills Affected:  1. Routines and Behaviors  2. Active Use of Coping Strategies  3. Environmental Adaptations   Number of elements that affect the Plan of Care: 3-5:  MODERATE COMPLEXITY   CLINICAL DECISION MAKIN [facility] [address] [id number] AM-PAC 6 Clicks   Basic Mobility Inpatient Short Form  How much help from another person does the patient currently need. .. Total A Lot A Little None   1. Putting on and taking off regular lower body clothing?   [ ] 1   [X] 2   [ ] 3   [ ] 4   2. Bathing (including washing, rinsing, drying)? [ ] 1   [X] 2   [ ] 3   [ ] 4   3.   Toileting, which includes using toilet, bedpan or urinal?   [ ] 1   [X] 2   [ ] 3   [ ] 4   4. Putting on and taking off regular upper body clothing?   [ ] 1   [X] 2   [ ] 3   [ ] 4   5. Taking care of personal grooming such as brushing teeth? [ ] 1   [X] 2   [ ] 3   [ ] 4   6. Eating meals? [ ] 1   [X] 2   [ ] 3   [ ] 4   © 2007, Trustees of [address] [place] [address] [address] [address], under license to [employer]. All rights reserved    Score:  Initial: 12, completed 4/23/17 Most Recent: X (Date: -- )     Interpretation of Tool:  Represents activities that are increasingly more difficult (i.e. Bed mobility, Transfers, Gait). Score 24 23 22-20 19-15 14-10 9-7 6       Modifier CH CI CJ CK CL CM CN         · Self Care:               - CURRENT STATUS:    CL - 60%-79% impaired, limited or restricted               - GOAL STATUS:           CK - 40%-59% impaired, limited or restricted               - D/C STATUS:                       ---------------To be determined---------------  [place] / Product Type: Medicare /       Medical Necessity:     · Patient is expected to demonstrate progress in strength, range of motion, balance, coordination and functional technique to decrease assistance required with mobility and increase independence with ADLS. Reason for Services/Other Comments:  · Patient continues to require skilled intervention due to pt in ICU; right UE flaccid, right inattention, maximum assist.   Use of outcome tool(s) and clinical judgement create a POC that gives a: MODERATE COMPLEXITY             TREATMENT:   (In addition to Assessment/Re-Assessment sessions the following treatments were rendered)      Pre-treatment Symptoms/Complaints:    Pain: Initial:   Pain Intensity 1: 0 does not indicate pain Post Session:  Does not indicate pain      Self Care: (15 minutes): Procedure(s) (per grid) utilized to improve and/or restore self-care/home management as related to grooming.  Required moderate visual, verbal and tactile cueing to facilitate activities of daily living skills and compensatory activities. Therapeutic Activity: (11 minutes): Therapeutic activities including Chair transfers to improve mobility, strength, balance and coordination. Required maximal   verbal and physical cueing to promote dynamic balance in standing. Braces/Orthotics/Lines/Etc:   · IV  · mantilla catheter  · O2 Device: Room air  Treatment/Session Assessment:    · Response to Treatment:  Cooperative but quiet, decreased verbalizations  · Interdisciplinary Collaboration:  · Physical Therapist  · Occupational Therapist  · Registered Nurse  · After treatment position/precautions:  · Supine in bed  · Nurse at bedside  · Compliance with Program/Exercises: Will assess as treatment progresses. · Recommendations/Intent for next treatment session: \"Next visit will focus on advancements to more challenging activities and reduction in assistance provided\".   Total Treatment Duration: 26 minutes  OT Patient Time In/Time Out  Time In: 4364  Time Out: 5581 Michael Neville OTR/L

## 2017-04-26 NOTE — PROGRESS NOTES
TRANSFER - OUT REPORT:    Verbal report given to Page Naranjo RN on Pen Degroft being transferred to [facility] for routine progression of care       Report consisted of patients Situation, Background, Assessment and Recommendations (SBAR). Information from the following report(s) SBAR and Med Rec Status was reviewed with the receiving nurse. Opportunity for questions and clarification was provided.

## 2017-04-26 NOTE — PROGRESS NOTES
Bedside and Verbal shift change report given to Cammy Marie RN (oncoming nurse) by self Louis jaffe). Report included the following information SBAR.

## 2017-04-26 NOTE — PROGRESS NOTES
Progress Note    Patient: Kevin Johnson MRN: [id number]  SSN: [id number]    YOB: 1935  Age: 80 y.o. Sex: female      Admit Date: 4/22/2017    LOS: 4 days     Subjective:     81 YO   patient  admitted with Afib + RVR and R sided weakness. She was on an Amio drip and converted to NSR, she was converted to oral amio today. She is on anticoagulation with heparin. MRI + MRA showed severe disease of the posterior circulation + left CVA of the basal ganglia. Yesterday she was drowsy and not cooperative with the nurse personnel. An NGT tube was inserted for tube feedings and medication administration. 04/25/2017- seen drowsy but arousable following commands    05/26/2017- pt seen- no adverse overnight events reported    Objective:     Vitals:    04/26/17 0818 04/26/17 1127 04/26/17 1148 04/26/17 1149   BP: 194/90 158/50  161/68   Pulse: 95   73   Resp: 19   16   Temp: 97.8 °F (36.6 °C)   98.4 °F (36.9 °C)   SpO2: 95%  95% 96%   Weight:       Height:            Intake and Output:  Current Shift: 04/26 0701 - 04/26 1900  In: 60   Out: 50 [Urine:50]  Last three shifts: 04/24 1901 - 04/26 0700  In: 940 [I.V.:500]  Out: 1500 [Urine:1500]    Physical Exam:   GENERAL: drowsy   EYE: conjunctivae/corneas clear. PERRL, EOM's intact. Fundi benign  LYMPHATIC: Cervical, supraclavicular, and axillary nodes normal.   THROAT & NECK: normal and no erythema or exudates noted. LUNG: clear to auscultation bilaterally  HEART: regular rate and rhythm, S1, S2 normal, no murmur, click, rub or gallop  ABDOMEN: soft, non-tender. Bowel sounds normal. No masses,  no organomegaly  EXTREMITIES:  extremities normal, atraumatic, no cyanosis or edema  SKIN: Normal.  NEUROLOGIC: R sided paresis   PSYCHIATRIC: non focal    Lab/Data Review:  Lab results reviewed. For significant abnormal values and values requiring intervention, see assessment and plan.          Assessment:     Principal Problem:    Cerebrovascular accident (CVA) due to occlusion of precerebral artery ([facility] [address] (4/24/2017)    Active Problems:    Hypertensive urgency (4/22/2017)      Paroxysmal atrial fibrillation ([address] (4/22/2017)      Elevated troponin (4/24/2017)        Plan:     # on oral amiodarone   # pt was on a heparin drip- had bleeding on 04/24/2017 and heparin drip was discontinued, repeat ct  Head negative was for any hemmorhagic conversion-   # neurology consulted for ac recommendations- see their detailed note- appreciated thank you. # on ASA -will add plavix as per neurology recommendations  # Seen by speech therapy,- initially they were allowing for a modified diet , however she was drowsy 04/24/2017- so an an NGT was inserted for tube feedings. Seen by speech 04/25/2107 when more alert- still failed and remains NPO  # On lopressor BID + norvasc .  Allowing her to have some permissive hypertension   # MRI showed acute CVA of the left basal ganglia   # MRA showed severe atherosclerosis of the posterior circulation   #Hypokalemia- replete- check mag  # Ppd placed  # sw consult for placement    Signed By: Bob Arnold MD     April 26, 2017

## 2017-04-26 NOTE — PROGRESS NOTES
Bedside and Verbal shift change report given to Sharita Garcia RN (oncoming nurse) by Melisa Santana RN (offgoing nurse). Report included the following information SBAR, Kardex, Accordion and Recent Results.

## 2017-04-26 NOTE — PROGRESS NOTES
Referral sent to [facility]. Patient's daughter states that is pretty close to where they live. She wanted something close to their home.

## 2017-04-27 ENCOUNTER — ANESTHESIA EVENT (OUTPATIENT)
Dept: ENDOSCOPY | Age: 82
DRG: 064 | End: 2017-04-27
Payer: MEDICARE

## 2017-04-27 LAB
ANION GAP BLD CALC-SCNC: 10 MMOL/L (ref 7–16)
APTT PPP: 30.7 SEC (ref 23.5–31.7)
BACTERIA SPEC CULT: NORMAL
BASOPHILS # BLD AUTO: 0 K/UL (ref 0–0.2)
BASOPHILS # BLD: 0 % (ref 0–2)
BUN SERPL-MCNC: 27 MG/DL (ref 8–23)
CALCIUM SERPL-MCNC: 8.2 MG/DL (ref 8.3–10.4)
CHLORIDE SERPL-SCNC: 108 MMOL/L (ref 98–107)
CO2 SERPL-SCNC: 26 MMOL/L (ref 21–32)
CREAT SERPL-MCNC: 0.79 MG/DL (ref 0.6–1)
DIFFERENTIAL METHOD BLD: ABNORMAL
EOSINOPHIL # BLD: 0.1 K/UL (ref 0–0.8)
EOSINOPHIL NFR BLD: 1 % (ref 0.5–7.8)
ERYTHROCYTE [DISTWIDTH] IN BLOOD BY AUTOMATED COUNT: 13.7 % (ref 11.9–14.6)
GLUCOSE SERPL-MCNC: 143 MG/DL (ref 65–100)
HCT VFR BLD AUTO: 45.6 % (ref 35.8–46.3)
HGB BLD-MCNC: 15.5 G/DL (ref 11.7–15.4)
IMM GRANULOCYTES # BLD: 0 K/UL (ref 0–0.5)
IMM GRANULOCYTES NFR BLD AUTO: 0.3 % (ref 0–5)
LYMPHOCYTES # BLD AUTO: 14 % (ref 13–44)
LYMPHOCYTES # BLD: 1 K/UL (ref 0.5–4.6)
MCH RBC QN AUTO: 29.6 PG (ref 26.1–32.9)
MCHC RBC AUTO-ENTMCNC: 34 G/DL (ref 31.4–35)
MCV RBC AUTO: 87 FL (ref 79.6–97.8)
MM INDURATION POC: 0 MM (ref 0–5)
MONOCYTES # BLD: 0.8 K/UL (ref 0.1–1.3)
MONOCYTES NFR BLD AUTO: 11 % (ref 4–12)
NEUTS SEG # BLD: 5.5 K/UL (ref 1.7–8.2)
NEUTS SEG NFR BLD AUTO: 74 % (ref 43–78)
PLATELET # BLD AUTO: 131 K/UL (ref 150–450)
PMV BLD AUTO: ABNORMAL FL (ref 10.8–14.1)
POTASSIUM SERPL-SCNC: 3.8 MMOL/L (ref 3.5–5.1)
PPD POC: NORMAL NEGATIVE
RBC # BLD AUTO: 5.24 M/UL (ref 4.05–5.25)
SERVICE CMNT-IMP: NORMAL
SODIUM SERPL-SCNC: 144 MMOL/L (ref 136–145)
WBC # BLD AUTO: 7.3 K/UL (ref 4.3–11.1)

## 2017-04-27 PROCEDURE — 74011250637 HC RX REV CODE- 250/637: Performed by: HOSPITALIST

## 2017-04-27 PROCEDURE — 74011250637 HC RX REV CODE- 250/637: Performed by: INTERNAL MEDICINE

## 2017-04-27 PROCEDURE — 77030018798 HC PMP KT ENTRL FED COVD -A

## 2017-04-27 PROCEDURE — 74011000250 HC RX REV CODE- 250: Performed by: INTERNAL MEDICINE

## 2017-04-27 PROCEDURE — 65270000029 HC RM PRIVATE

## 2017-04-27 PROCEDURE — 80048 BASIC METABOLIC PNL TOTAL CA: CPT | Performed by: INTERNAL MEDICINE

## 2017-04-27 PROCEDURE — 92526 ORAL FUNCTION THERAPY: CPT

## 2017-04-27 PROCEDURE — 97530 THERAPEUTIC ACTIVITIES: CPT

## 2017-04-27 PROCEDURE — 36415 COLL VENOUS BLD VENIPUNCTURE: CPT | Performed by: HOSPITALIST

## 2017-04-27 PROCEDURE — 74011250636 HC RX REV CODE- 250/636: Performed by: HOSPITALIST

## 2017-04-27 PROCEDURE — 85025 COMPLETE CBC W/AUTO DIFF WBC: CPT | Performed by: INTERNAL MEDICINE

## 2017-04-27 PROCEDURE — 74011000258 HC RX REV CODE- 258: Performed by: HOSPITALIST

## 2017-04-27 PROCEDURE — 85730 THROMBOPLASTIN TIME PARTIAL: CPT | Performed by: HOSPITALIST

## 2017-04-27 PROCEDURE — 74011250637 HC RX REV CODE- 250/637: Performed by: PHYSICIAN ASSISTANT

## 2017-04-27 RX ORDER — METRONIDAZOLE 500 MG/100ML
500 INJECTION, SOLUTION INTRAVENOUS ONCE
Status: COMPLETED | OUTPATIENT
Start: 2017-04-27 | End: 2017-04-27

## 2017-04-27 RX ORDER — GUAIFENESIN 100 MG/5ML
81 LIQUID (ML) ORAL DAILY
Status: DISCONTINUED | OUTPATIENT
Start: 2017-04-28 | End: 2017-04-27 | Stop reason: SDUPTHER

## 2017-04-27 RX ORDER — CEFAZOLIN SODIUM IN 0.9 % NACL 2 G/50 ML
2 INTRAVENOUS SOLUTION, PIGGYBACK (ML) INTRAVENOUS
Status: COMPLETED | OUTPATIENT
Start: 2017-04-27 | End: 2017-04-28

## 2017-04-27 RX ORDER — CIPROFLOXACIN 500 MG/1
500 TABLET ORAL EVERY 12 HOURS
Status: DISCONTINUED | OUTPATIENT
Start: 2017-04-27 | End: 2017-04-28

## 2017-04-27 RX ADMIN — METOPROLOL TARTRATE 50 MG: 50 TABLET ORAL at 08:40

## 2017-04-27 RX ADMIN — ACETAMINOPHEN 650 MG: 325 TABLET ORAL at 17:28

## 2017-04-27 RX ADMIN — LEVOTHYROXINE SODIUM 50 MCG: 50 TABLET ORAL at 05:34

## 2017-04-27 RX ADMIN — FAMOTIDINE 20 MG: 20 TABLET ORAL at 08:40

## 2017-04-27 RX ADMIN — Medication 5 ML: at 13:15

## 2017-04-27 RX ADMIN — LISINOPRIL 40 MG: 20 TABLET ORAL at 08:40

## 2017-04-27 RX ADMIN — AMLODIPINE BESYLATE 10 MG: 10 TABLET ORAL at 21:34

## 2017-04-27 RX ADMIN — CIPROFLOXACIN HYDROCHLORIDE 500 MG: 500 TABLET, FILM COATED ORAL at 08:40

## 2017-04-27 RX ADMIN — Medication 10 ML: at 05:34

## 2017-04-27 RX ADMIN — METOPROLOL TARTRATE 50 MG: 50 TABLET ORAL at 17:26

## 2017-04-27 RX ADMIN — CLOPIDOGREL BISULFATE 75 MG: 75 TABLET ORAL at 08:40

## 2017-04-27 RX ADMIN — CIPROFLOXACIN HYDROCHLORIDE 500 MG: 500 TABLET, FILM COATED ORAL at 21:34

## 2017-04-27 RX ADMIN — AMIODARONE HYDROCHLORIDE 400 MG: 200 TABLET ORAL at 08:40

## 2017-04-27 RX ADMIN — PRAVASTATIN SODIUM 40 MG: 20 TABLET ORAL at 21:34

## 2017-04-27 RX ADMIN — METRONIDAZOLE 500 MG: 500 INJECTION, SOLUTION INTRAVENOUS at 17:28

## 2017-04-27 RX ADMIN — ASPIRIN 81 MG: 81 TABLET, CHEWABLE ORAL at 08:40

## 2017-04-27 RX ADMIN — AMIODARONE HYDROCHLORIDE 400 MG: 200 TABLET ORAL at 21:34

## 2017-04-27 RX ADMIN — Medication 10 ML: at 21:35

## 2017-04-27 RX ADMIN — PIPERACILLIN SODIUM,TAZOBACTAM SODIUM 3.38 G: 3; .375 INJECTION, POWDER, FOR SOLUTION INTRAVENOUS at 05:33

## 2017-04-27 NOTE — PROGRESS NOTES
Problem: Nutrition Deficit  Goal: *Optimize nutritional status  Nutrition:  Reason for assessment: TF management   Assessment:   Food/Nutrition History: Patient tolerating TF at goal rate (Jevity 1.2 50ml/hr with 25 mlq1). FT in place,  Abdomen intact with active bowel sounds. Date of Last BM: 4/25. SLP recommends continued NPO as of 4/26. Plan for potential G tube placement. Labs are remarkable for Na 144. Patient may benefit from an increase in hourly water flush amount, however, Zosyn d/c today so Na may trend down. Anthropometrics:Height: 5' (152.4 cm), Weight Source: Bed, Weight: 52.2 kg (115 lb), Body mass index is 22.46 kg/(m^2). BMI class of underweight for age >65 years. Macronutrient Needs:  Estimated calorie needs - 6724-2293 aline/day (25-28 aline/kg/day)   Estimated protein needs - 54-70 gm pro/day (1.2-1.3 gm pro/kgIBW/day) (GFR >60 ml/min)  Max CHO/day - 210 gm CHO/day (60% aline/day)   Fluid/day - 1.3-1.4 liters/day (1 ml/aline/day)  Intake/Comparative standards: TF at goal rate of 50 ml/hr - 1440 calories/day (100% calorie goal), 67 grams protein/day (100% protein goal), 203 grams CHO/day (does not exceed max CHO limit) and 1572 ml water/day (100% fluid goal). Intervention:  Meals and snacks: NPO  EN:Continue TF at goal rate of 50 ml/hr - 1440 calories/day (100% calorie goal), 67 grams protein/day (100% protein goal), 203 grams CHO/day (does not exceed max CHO limit) and 1572 ml water/day (100% fluid goal). Patient may require increase in water flush tomorrow. Nutrition Supplement Therapy: Electrolyte replacement per nutritional support protocols are active on MAR. Coordination of nutrition care: Harreit RN  Please consult RD if G tube is placed.       Radha Henriquez [address], [address] [place] [address], [facility] [address] [place] [address], [phone]

## 2017-04-27 NOTE — PROGRESS NOTES
Problem: Dysphagia (Adult)  Goal: *Acute Goals and Plan of Care (Insert Text)  ST. Pt. Will tolerate PO trials of diet upgrades with SLP at bedside with no overt s/sx of aspiration/penetration. Goal discharged 2017   2. Patient will complete bedside swallow evaluation with 100% participation. (goal met 17)  3. Pt. Will participate in speech/language/cognitive evaluation with 100% participation. 4. Patient will tolerate puree/honey thick liquids/no straws with no overt s/sx of aspiration/penetration. Goal added 2017    LTG: Pt. Will tolerate least restrictive diet without respiratory decline. SPEECH LANGUAGE PATHOLOGY: BEDSIDE SWALLOW NOTE: Daily Note 4     NAME/AGE/GENDER: Ap Echevarria is a 80 y.o. female  DATE: 2017  PRIMARY DIAGNOSIS: TIA (transient ischemic attack)  Hypertensive urgency  TIA (transient ischemic attack)  Hypertensive urgency       ICD-10: Treatment Diagnosis: R13.12 Dysphagia, Oropharyngeal Phase     INTERDISCIPLINARY COLLABORATION: Registered Nurse  PRECAUTIONS/ALLERGIES: Review of patient's allergies indicates no known allergies. ASSESSMENT:   Patient re-assessed to determine improvement in swallowing function versus need for PEG placement. Patient is drowsy but states her name and follows simple one step directions with cues. Daughter is at bedside. Decreased ROM opening her mouth to accept trials this date. Patient with multiple, discoordinated swallows with a half tsp of honey thick liquids with wet vocal quality and delayed coughing. Multiple swallows with half tsp of pureed trial with patient at high risk for aspiration with residual given signs/sx of aspiration and likely poor pharyngeal clearance. Recommend continue NPO. Discussed with pt, family, RN, and hospitalist.  Plans for PEG tomorrow. Patient's daughter was provided with oral motor exercises that ST attempted with patient previous session.   Will continue to follow for dysphagia, dysarthria, and cognition. ?????? ? ? This section established at most recent assessment??????????  PROBLEM LIST (Impairments causing functional limitations):  1. dysphagia  REHABILITATION POTENTIAL FOR STATED GOALS: FAIR      PLAN OF CARE:   Patient will benefit from skilled intervention to address the following impairments. RECOMMENDATIONS AND PLANNED INTERVENTIONS (Benefits and precautions of therapy have been discussed with the patient.):  · NPO  MEDICATIONS:  · Non-oral  COMPENSATORY STRATEGIES/MODIFICATIONS INCLUDING:  ·   OTHER RECOMMENDATIONS (including follow up treatment recommendations): · Family training/education  · Patient education  RECOMMENDED DIET MODIFICATIONS DISCUSSED WITH:  · Nursing  · Patient  FREQUENCY/DURATION: Continue to follow patient 2 times a week as able or until goals met. RECOMMENDED REHABILITATION/EQUIPMENT: (at time of discharge pending progress):   Continue Skilled Therapy and Rehab. SUBJECTIVE:   Increased alertness/bolus acceptance, however +s/sx of aspiration. Tells me her daughter was here \"earlier. \" Answers some basic y/n questions. History of Present Injury/Illness: Ms. Cb Campbell  has a past medical history of Anxiety (4/4/2017); Dementia (4/4/2017); Dizziness (4/4/2017); Hyperlipidemia (4/4/2017); Hypertension (4/4/2017); Hypothyroidism (4/4/2017); Osteoarthritis (4/4/2017); and PVD (peripheral vascular disease) ([facility] [address] (4/4/2017). .  She also  has a past surgical history that includes hysterectomy. Present Symptoms:    Pain Intensity 1: 0  Current Medications:   No current facility-administered medications on file prior to encounter. Current Outpatient Prescriptions on File Prior to Encounter   Medication Sig Dispense Refill    levothyroxine (SYNTHROID) 50 mcg tablet Take  by mouth Daily (before breakfast).  metoprolol tartrate (LOPRESSOR) 50 mg tablet Take  by mouth daily.  pravastatin (PRAVACHOL) 40 mg tablet Take 40 mg by mouth nightly.        · Current Dietary Status:  NPO  ·   History of reflux:  NO  Social History/Home Situation:    Home Environment: Private residence  One/Two Story Residence: Two story  Living Alone: No  Support Systems: Child(keysha), Family member(s)  Patient Expects to be Discharged to[de-identified] Rehabilitation facility  Current DME Used/Available at Home: None      OBJECTIVE:   Respiratory Status:        CXR Results:IMPRESSION: No acute abnormality. MRI Pending  CT Results:IMPRESSION:   1. No definite acute intracranial abnormality. Note, acute/subacute CVA cannot  be excluded secondary to the severity of the underlying white matter disease. 2. Severe chronic white matter microangiopathic disease with moderate bilateral  atrophy. Oral Motor Structure/Speech:  Oral-Motor Structure/Motor Speech  Labial: Decreased rate, Impaired coordination, Right droop, Decreased seal  Dentition: Edentulous  Oral Hygiene: dry  Lingual: Decreased rate, Incoordinated     Cognitive and Communication Status:  Neurologic State: Drowsy  Orientation Level: Oriented to person  Cognition: Decreased attention/concentration  Perception: Cues to attend left visual field     Safety/Judgement: Decreased awareness of need for safety;Decreased insight into deficits     BEDSIDE SWALLOW EVALUATION  Oral Assessment:  Oral Assessment  Labial: Decreased rate; Impaired coordination;Right droop; Decreased seal  Dentition: Edentulous  Lingual: Decreased rate; Incoordinated  P.O.  Trials:  Patient Position: upright in bed     The patient was given bite/sip amounts of the following:   Consistency Presented: Honey thick liquid;Puree  How Presented: SLP-fed/presented;Spoon     ORAL PHASE:  Bolus Acceptance: Impaired  Bolus Formation/Control: Impaired  Propulsion: Delayed (# of seconds)  Type of Impairment: Delayed;Piecemeal  Oral Residue: None     PHARYNGEAL PHASE:  Initiation of Swallow: Delayed (# of seconds)  Laryngeal Elevation: Decreased;Weak  Aspiration Signs/Symptoms: Change vocal quality;Delayed cough/throat clear  Vocal Quality: Low volume (dysarthric)           Pharyngeal Phase Characteristics: Suspected pharyngeal residue;Effortful swallow;Multiple swallows;Easily fatigued ; Poor endurance     OTHER OBSERVATIONS:  Rate/bite size: Impaired   Endurance:  Impaired      Comments: not appropriate for further trials     Tool Used: Dysphagia Outcome and Severity Scale (BLANCHE)     Score Comments   Normal Diet  [ ] 7 With no strategies or extra time needed   Functional Swallow  [ ] 6 May have mild oral or pharyngeal delay         Mild Dysphagia     [ ] 5 Which may require one diet consistency restricted (those who demonstrate penetration which is entirely cleared on MBS would be included)   Mild-Moderate Dysphagia  [ ] 4 With 1-2 diet consistencies restricted         Moderate Dysphagia  [ ] 3 With 2 or more diet consistencies restricted         Moderately Severe Dysphagia  [X] 2 With partial PO strategies (trials with ST only)         Severe Dysphagia  [ ] 1 With inability to tolerate any PO safely            Score:  Initial: 2 Most Recent: X (Date: -- )   Interpretation of Tool: The Dysphagia Outcome and Severity Scale (BLANCHE) is a simple, easy-to-use, 7-point scale developed to systematically rate the functional severity of dysphagia based on objective assessment and make recommendations for diet level, independence level, and type of nutrition.        Score 7 6 5 4 3 2 1   Modifier CH CI CJ CK CL CM CN   · Swallowing:               - CURRENT STATUS:           CM - 80%-99% impaired, limited or restricted               - GOAL STATUS:                   CJ - 20%-39% impaired, limited or restricted               - D/C STATUS:                       ---------------To be determined---------------  [place] / [place] / Product Type: Medicare /       TREATMENT:         (In addition to Assessment/Re-Assessment sessions the following treatments were rendered)  Dysphagia Activities: Activities/Procedures listed utilized to improve progress in swallow strength, swallow timeliness, swallow function, diet tolerance and swallow safety. Required moderate to max cueing to improve swallow safety, work toward diet advancement and decrease aspiration risk. MODALITIES:                                                                     ORAL MOTOR  EXERCISES:                                                                                                                                                                       LARYNGEAL / PHARYNGEAL EXERCISES:                                                                                                                                      __________________________________________________________________________________________________  Safety:   After treatment position/precautions:  · Call light within reach  · RN notified  · Upright in Bed  Treatment Assessment:  Patient required mod cues to participate in PO trials. Progression/Medical Necessity:   · Skilled intervention continues to be required due to persistent signs and symptoms of aspiration. Compliance with Program/Exercises: Will assess as treatment progresses. Reason for Continuation of Services/Other Comments:  · Patient continues to require skilled intervention due to medical complications and patient unable to attend/participate in therapy as expected.   Recommendations/Intent for next treatment session: \"Treatment next visit will focus on oral motor exercises; ST rosa  Total Treatment Duration:  Time In: 1118  Time Out: [address] Chiu MS, SLP

## 2017-04-27 NOTE — CONSULTS
[facility] Consult Note         Referring Physician:  Dr Yelena Auguste Date:  4/27/2017    Admit Date:  4/22/2017    Chief Complaint:  Evaluate for PEG    Subjective:     History of Present Illness:  Patient is a 80 y.o. female with PMH of dementia, HLD, HTN, OA, admitted after a fib episode with RVR and new left ganglia CVA, who is seen in consultation at the request of Dr. Jocelyne Vanessa for PEG evaluation. She was seen by speech today and deemed high aspiration risk. She is currently getting NG tube feeds. Blu Vasquez was seen by cardiology, on IV amiodarone and now recommended for oral amiodarone 400 mg BID for 2 weeks, then 200 mg BID for 2 weeks then 200 mg daily. She has converted to NSR. She was on heparin drip that was stopped with bleeding but no evidence of hemorrhagic conversion. She has been seen by neurology with plans for asa/plavix for 2 weeks then conversion to full anticoagulation with either coumadin or NOAC. She has ECOLI UTI, day 6 antibiotics, quinolone sensitive. She has required NGT for feeds/meds. Plan is to discharge to SNF. No family at bedside. PMH:  Past Medical History:   Diagnosis Date    Anxiety 4/4/2017    --SEES DR. Dani Garza    Dementia 4/4/2017    --SEEN NEUROLOGIST - DR. MELENDEZ -     Dizziness 4/4/2017    Hyperlipidemia 4/4/2017    Hypertension 4/4/2017    Hypothyroidism 4/4/2017    Osteoarthritis 4/4/2017    PVD (peripheral vascular disease) ([facility] [address] 4/4/2017    --CAROTIDS       PSH:  Past Surgical History:   Procedure Laterality Date    HX HYSTERECTOMY         Allergies:  No Known Allergies    Home Medications:  Prior to Admission medications    Medication Sig Start Date End Date Taking? Authorizing Provider   levothyroxine (SYNTHROID) 50 mcg tablet Take  by mouth Daily (before breakfast). Yes Historical Provider   metoprolol tartrate (LOPRESSOR) 50 mg tablet Take  by mouth daily.    Yes Historical Provider   pravastatin (PRAVACHOL) 40 mg tablet Take 40 mg by mouth nightly. Yes Historical Provider       Hospital Medications:  Current Facility-Administered Medications   Medication Dose Route Frequency    ciprofloxacin HCl (CIPRO) tablet 500 mg  500 mg Oral Q12H    amLODIPine (NORVASC) tablet 10 mg  10 mg Oral QHS    lisinopril (PRINIVIL, ZESTRIL) tablet 40 mg  40 mg Oral DAILY    clopidogrel (PLAVIX) tablet 75 mg  75 mg Oral DAILY    famotidine (PEPCID) tablet 20 mg  20 mg Oral DAILY    LORazepam (ATIVAN) injection 1 mg  1 mg IntraVENous Q6H PRN    polyvinyl alcohol (LIQUIFILM TEARS) 1.4 % ophthalmic solution 1 Drop  1 Drop Both Eyes PRN    metoprolol tartrate (LOPRESSOR) tablet 50 mg  50 mg Oral BID    aspirin chewable tablet 81 mg  81 mg Oral DAILY    LORazepam (ATIVAN) injection 1 mg  1 mg IntraVENous ONCE PRN    amiodarone (CORDARONE) tablet 400 mg  400 mg Oral BID    hydrALAZINE (APRESOLINE) 20 mg/mL injection 20 mg  20 mg IntraVENous Q4H PRN    metoprolol (LOPRESSOR) injection 5 mg  5 mg IntraVENous Q4H PRN    levothyroxine (SYNTHROID) tablet 50 mcg  50 mcg Oral ACB    pravastatin (PRAVACHOL) tablet 40 mg  40 mg Oral QHS    sodium chloride (NS) flush 5-10 mL  5-10 mL IntraVENous Q8H    sodium chloride (NS) flush 5-10 mL  5-10 mL IntraVENous PRN    acetaminophen (TYLENOL) tablet 650 mg  650 mg Oral Q4H PRN       Social History:  Social History   Substance Use Topics    Smoking status: Never Smoker    Smokeless tobacco: Not on file    Alcohol use Not on file       Pt denies any history of drug use, blood transfusions, or tattoos. Family History:  No family history on file.     Review of Systems:  Unable to ascertain    Diet:  NG tube feeds    Objective:     Physical Exam:  Vitals:  Visit Vitals    /64 (BP 1 Location: Left arm, BP Patient Position: At rest)    Pulse 76    Temp 97.7 °F (36.5 °C)    Resp 16    Ht 5' (1.524 m)    Wt 52.2 kg (115 lb)    SpO2 95%    BMI 22.46 kg/m2     Gen:  Responds to voice but not appropriately   Skin:  Extremities and face reveal no rashes. HEENT: Sclerae anicteric. Extra-occular muscles are intact. No oral ulcers. No abnormal pigmentation of the lips. NG in place  Cardiovascular: Regular rate and rhythm. No murmurs, gallops, or rubs. Respiratory:  Comfortable breathing with no accessory muscle use. Clear breath sounds anteriorly with no wheezes, rales, or rhonchi. GI:  Abdomen nondistended, soft, and nontender. Normal active bowel sounds. No enlargement of the liver or spleen. No masses palpable. Rectal:  Deferred  Musculoskeletal:  No pitting edema of the lower legs. Neurological:  Patient is responsive to voice and touch. No meaningful communication  Psychiatric:  Unobtainable      Laboratory:    Recent Labs      04/27/17   0655  04/26/17   0430  04/25/17   0805  04/24/17   1830  04/24/17   1155   WBC  7.3  9.7  10.9   --    --    HGB  15.5*  16.5*  15.9*   --    --    HCT  45.6  47.6*  47.7*   --    --    PLT  131*  120*  127*   --    --    MCV  87.0  85.9  89.3   --    --    NA  144  143  148*   --    --    K  3.8  3.1*  3.4*   --    --    CL  108*  106  111*   --    --    CO2  26  26  25   --    --    BUN  27*  25*  29*   --    --    CREA  0.79  0.66  0.74   --    --    CA  8.2*  8.9  8.4   --    --    MG   --   2.2   --    --    --    GLU  143*  128*  106*   --    --    APTT  30.7  32.7*  29.6  57.8*  79.5*          Assessment:     Principal Problem:    Cerebrovascular accident (CVA) due to occlusion of precerebral artery (HCC) (4/24/2017)    Active Problems:    Hypertensive urgency (4/22/2017)      Paroxysmal atrial fibrillation (HCC) (4/22/2017)      Elevated troponin (4/24/2017)      81 yo patient seen  with PMH of dementia, HLD, HTN, OA, admitted after a fib episode with RVR and new left ganglia CVA, who is seen in consultation at the request of Dr. Burma Ano for PEG evlauation. She is high risk for aspiration with oral intake and speech recs NPO. Nuerology started 2 weeks of ASA/Plavix to be followed by Coumadin    Plan:     1) Consider PEG placement after 5 day Plavix washout, if Plavix can be held per neurology. Turner Betts NP  Patient is seen and examined in collaboration with Dr. Alonzo Roldan. Assessment and plan as per Dr. Jan Singh.

## 2017-04-27 NOTE — PROGRESS NOTES
Problem: Interdisciplinary Rounds  Goal: Interdisciplinary Rounds  Outcome: Progressing Towards Goal  Interdisciplinary team rounds were held 4/27/2017 with the following team members:Care Management, Physical Therapy, Physician and . Patient will need to be off Plavix for 5 days prior to PEG tube placement. Anticipate discharge next Thursday if medically stable. Plan of care discussed. See clinical pathway and/or care plan for interventions and desired outcomes.

## 2017-04-27 NOTE — PROGRESS NOTES
[facility].  Awaiting response. PPD placed 4/25/17. [facility] following.     2069:  [facility] is interesting in accepting the pt for admission but she does not have supplemental insurance so she would only qualify for 20 days for medicare to cover rehab at 100%. The facility is concerned that the pt would need more that 20 days and is unsure if paying a $160/day co pay starting on day 21 is feasible for the pt/family. The [facility] admissions coordinator is contacting the pt's dtr to discuss these financial issues and will notify [facility] if they can accept the pt. [facility] following.

## 2017-04-27 NOTE — PROGRESS NOTES
Hospitalist Progress Note    2017  9:59 AM  Admit Date: 2017  8:54 AM   NAME: Ap Cerna   :  1935   MRN:  [id number]   Attending: Tayla Gonzáles MD  PCP:  Phyllis Rockwell MD  Treatment Team: Attending Provider: Tayla Gonzáles MD; Care Manager: Kait Reyna RN; Physical Therapist: Ming Morales PT; Utilization Review: Darinel Felder; Charge Nurse: Gil Briggs RN  SUBJECTIVE:       Mr. Kitty Arora is a 81 yo female admitted with afib with RVR and new left basal ganglia CVA. Managed on tele, seen by cardiology, on IV amiodarone and now recommended for oral amiodarone 400 mg BID for 2 weeks, then 200 mg BID for 2 weeks then 200 mg daily. She has converted to NSR. She was on heparin drip that was stopped with bleeding but no evidence of hemorrhagic conversion. She has been seen by neurology with plans for asa/plavix for 2 weeks then conversion to full anticoagulation with either coumadin or NOAC. She has ECOLI UTI, day 6 antibiotics, quinolone sensitive. She has required NGT for feeds/meds and will need SNF.  SW assisting       17 no family present, patient resting with eyes closed, does not respond to voice/ command        Recent Results (from the past 24 hour(s))   PLEASE READ & DOCUMENT PPD TEST IN 24 HRS    Collection Time: 17  3:00 PM   Result Value Ref Range    PPD Neg Negative    mm Induration 0 mm   PTT    Collection Time: 17  6:55 AM   Result Value Ref Range    aPTT 30.7 23.5 - 31.7 SEC   CBC WITH AUTOMATED DIFF    Collection Time: 17  6:55 AM   Result Value Ref Range    WBC 7.3 4.3 - 11.1 K/uL    RBC 5.24 4.05 - 5.25 M/uL    HGB 15.5 (H) 11.7 - 15.4 g/dL    HCT 45.6 35.8 - 46.3 %    MCV 87.0 79.6 - 97.8 FL    MCH 29.6 26.1 - 32.9 PG    MCHC 34.0 31.4 - 35.0 g/dL    RDW 13.7 11.9 - 14.6 %    PLATELET 755 (L) 158 - 450 K/uL    MPV Cannot be calulated 10.8 - 14.1 FL    DF AUTOMATED      NEUTROPHILS 74 43 - 78 %    LYMPHOCYTES 14 13 - 44 %    MONOCYTES 11 4.0 - 12.0 % EOSINOPHILS 1 0.5 - 7.8 %    BASOPHILS 0 0.0 - 2.0 %    IMMATURE GRANULOCYTES 0.3 0.0 - 5.0 %    ABS. NEUTROPHILS 5.5 1.7 - 8.2 K/UL    ABS. LYMPHOCYTES 1.0 0.5 - 4.6 K/UL    ABS. MONOCYTES 0.8 0.1 - 1.3 K/UL    ABS. EOSINOPHILS 0.1 0.0 - 0.8 K/UL    ABS. BASOPHILS 0.0 0.0 - 0.2 K/UL    ABS. IMM.  GRANS. 0.0 0.0 - 0.5 K/UL   METABOLIC PANEL, BASIC    Collection Time: 04/27/17  6:55 AM   Result Value Ref Range    Sodium 144 136 - 145 mmol/L    Potassium 3.8 3.5 - 5.1 mmol/L    Chloride 108 (H) 98 - 107 mmol/L    CO2 26 21 - 32 mmol/L    Anion gap 10 7 - 16 mmol/L    Glucose 143 (H) 65 - 100 mg/dL    BUN 27 (H) 8 - 23 MG/DL    Creatinine 0.79 0.6 - 1.0 MG/DL    GFR est AA >60 >60 ml/min/1.73m2    GFR est non-AA >60 >60 ml/min/1.73m2    Calcium 8.2 (L) 8.3 - 10.4 MG/DL       No Known Allergies  Current Facility-Administered Medications   Medication Dose Route Frequency Provider Last Rate Last Dose    ciprofloxacin HCl (CIPRO) tablet 500 mg  500 mg Oral Q12H Nyla Mohamud MD   500 mg at 04/27/17 0840    amLODIPine (NORVASC) tablet 10 mg  10 mg Oral QHS Thomas Rabago MD   10 mg at 04/26/17 2202    lisinopril (PRINIVIL, ZESTRIL) tablet 40 mg  40 mg Oral DAILY MIRELLA Downing   40 mg at 04/27/17 0840    clopidogrel (PLAVIX) tablet 75 mg  75 mg Oral DAILY Alison De Los Santos MD   75 mg at 04/27/17 0840    famotidine (PEPCID) tablet 20 mg  20 mg Oral DAILY Aidee Peoples MD   20 mg at 04/27/17 0840    LORazepam (ATIVAN) injection 1 mg  1 mg IntraVENous Q6H PRN Pete Teague MD   1 mg at 04/25/17 0100    polyvinyl alcohol (LIQUIFILM TEARS) 1.4 % ophthalmic solution 1 Drop  1 Drop Both Eyes PRN Aidee Peoples MD   1 Drop at 04/25/17 1437    metoprolol tartrate (LOPRESSOR) tablet 50 mg  50 mg Oral BID Gary Saldaña MD   50 mg at 04/27/17 0840    aspirin chewable tablet 81 mg  81 mg Oral DAILY Gary Saldaña MD   81 mg at 04/27/17 0840    LORazepam (ATIVAN) injection 1 mg  1 mg IntraVENous ONCE PRN Hardeep Mcclure MD        amiodarone (CORDARONE) tablet 400 mg  400 mg Oral BID MIRELLA Tinoco   400 mg at 17 0840    hydrALAZINE (APRESOLINE) 20 mg/mL injection 20 mg  20 mg IntraVENous Q4H PRN Jose Carlos Dominguez MD   20 mg at 17 0803    metoprolol (LOPRESSOR) injection 5 mg  5 mg IntraVENous Q4H PRN Hardeep Mcclure MD   5 mg at 17 4822    levothyroxine (SYNTHROID) tablet 50 mcg  50 mcg Oral ACB Surya Valero MD   50 mcg at 17 0534    pravastatin (PRAVACHOL) tablet 40 mg  40 mg Oral QHS Surya Valero MD   40 mg at 17 2202    sodium chloride (NS) flush 5-10 mL  5-10 mL IntraVENous Q8H Surya Valero MD   10 mL at 17 0534    sodium chloride (NS) flush 5-10 mL  5-10 mL IntraVENous PRN Surya Valero MD        acetaminophen (TYLENOL) tablet 650 mg  650 mg Oral Q4H PRN Surya Valero MD               Review of Systems as noted above in HPI   PHYSICAL EXAM     Visit Vitals    /53 (BP 1 Location: Left arm, BP Patient Position: At rest)    Pulse 80    Temp 97.6 °F (36.4 °C)    Resp 16    Ht 5' (1.524 m)    Wt 52.2 kg (115 lb)    SpO2 97%    BMI 22.46 kg/m2      Temp (24hrs), Av.1 °F (36.7 °C), Min:97.6 °F (36.4 °C), Max:99.1 °F (37.3 °C)    Oxygen Therapy  O2 Sat (%): 97 % (17 0713)  Pulse via Oximetry: 76 beats per minute (17 1148)  O2 Device: Room air (17 1200)  O2 Flow Rate (L/min): 0 l/min (17 1148)    Intake/Output Summary (Last 24 hours) at 17 0959  Last data filed at 17 0803   Gross per 24 hour   Intake             2560 ml   Output             1675 ml   Net              885 ml      General: No acute distress,resting  Lungs:  CTAB, good effort anterior exam  Heart:  Regular rate and rhythm, no murmur, no edema   Abdomen: Soft, nontender and nondistended, normal bowel sounds  Extremities: Warm and dry         DIAGNOSTIC STUDIES      Labs and Studies from previous 24 hours have been personally reviewed by myself           Full Code    ASSESSMENT / Barrington Parra 169 Problems    Diagnosis Date Noted    Elevated troponin 04/24/2017    Cerebrovascular accident (CVA) due to occlusion of precerebral artery ([facility] [address] 04/24/2017    Hypertensive urgency 04/22/2017    Paroxysmal atrial fibrillation ([facility] [address] 04/22/2017     -appreciate cardio and neuro recommendations   -continue asa/plavix and change to anticoagulation in 2 weeks   -change zosyn to cipro day 6/7 for UTI  -will discuss PEG plans with family if fails to improve with speech  -SW for SNF     FEN:NGT  DVT Prophylaxis: SCD  Disposition:pending  Time spent with patient:  Care plan addressed: daughter   Risk Assessment:  Signed By: Cuong Fisher MD     April 27, 2017

## 2017-04-27 NOTE — PROGRESS NOTES
Problem: Mobility Impaired (Adult and Pediatric)  Goal: *Therapy Goal (Edit Goal, Insert Text)  Right sided hemiplegia  STG:  (1.)Ms. Margarito Kiser will move from supine to sit and sit to supine , scoot up and down and roll side to side with MODERATE ASSIST within 7 day(s). (2.)Ms. Margarito Kiser will transfer from bed to chair and chair to bed with MODERATE ASSIST using the least restrictive device within 7 day(s). (3.)Ms. aMrgarito Kiser will ambulate with MAXIMAL ASSIST for 5 feet with the least restrictive device within 7 day(s). LTG:  (1.)Ms. Margarito Kiser will move from supine to sit and sit to supine , scoot up and down and roll side to side in bed with MINIMAL ASSIST within 14 day(s). (2.)Ms. Margarito Kiser will transfer from bed to chair and chair to bed with MINIMAL ASSIST using the least restrictive device within 14 day(s). (3.)Ms. Margarito Kiser will ambulate with MODERATE ASSIST for 25 feet with the least restrictive device within 14 day(s). ________________________________________________________________________________________________       PHYSICAL THERAPY: Daily Note, Treatment Day: 3rd and PM 4/27/2017  INPATIENT: Hospital Day: 6  [place] / [place] / Product Type: Medicare /      NAME/AGE/GENDER: Ap Kiser is a 80 y.o. female         PRIMARY DIAGNOSIS: TIA (transient ischemic attack)  Hypertensive urgency  TIA (transient ischemic attack)  Hypertensive urgency Cerebrovascular accident (CVA) due to occlusion of precerebral artery ([address] Cerebrovascular accident (CVA) due to occlusion of precerebral artery ([address]        ICD-10: Treatment Diagnosis:       · Paralytic gait (R26.1)  · Hemiplegia and hemiparesis following cerebral infarction affecting right dominant side (D64.350)   Precaution/Allergies:  Review of patient's allergies indicates no known allergies. ASSESSMENT:      Ms. Margarito Kiser presents in supine. Flaccid RUE and decreased strength RLE; dysarthric. Mitt on LUE.  Able to state name. Performs bed mobility MAX A x2. Sits unsupported for ~10 min; initially requiring MOD A and decreasing to supervision with occasional MIN A. Performs 5 B heel raises in sitting (decreased coordination) and 2 LAQ AAROM on RLE with moderate verbal/tactile/visual cueing. Pt was able to clearly verbalize wanting to sit in chair. Able to slide R foot backwards when unweighted for proper placement with sit to stand. Performs sit<>stand x3 with MAX A x2 with increased posterior lean and decreased hip extension. Initially attempted chair transfer to R side but unable to take steps with RLE. When performed to L side, pt able to take 4-5 steps with MOD A x2 to chair. Required MOD A x1 to scoot back into seat. BLE elevated and RUE propped and positioned. Progressed today with decreased assistance required for chair transfer and performed a few exercises with RLE. Skilled PT is indicated for patient safety and mobility progression during current acute care episode. This section established at most recent assessment   PROBLEM LIST (Impairments causing functional limitations):  1. Decreased Strength  2. Decreased ADL/Functional Activities  3. Decreased Transfer Abilities  4. Decreased Ambulation Ability/Technique  5. Decreased Balance  6. Decreased Activity Tolerance  7. Decreased Pacing Skills  8. Decreased Flexibility/Joint Mobility  9. Decreased [place] with Home Exercise Program    INTERVENTIONS PLANNED: (Benefits and precautions of physical therapy have been discussed with the patient.)  1. Balance Exercise  2. Bed Mobility  3. Family Education  4. Gait Training  5. Home Exercise Program (HEP)  6. Range of Motion (ROM)  7. Therapeutic Activites  8. Therapeutic Exercise/Strengthening  9. Transfer Training  10.  Neuromuscular reeducation      TREATMENT PLAN: Frequency/Duration: 3 times a week for duration of hospital stay  Rehabilitation Potential For Stated Goals: GOOD      RECOMMENDED REHABILITATION/EQUIPMENT: (at time of discharge pending progress): Continue Skilled Therapy and and to be determined at medical discharge. HISTORY:   History of Present Injury/Illness (Reason for Referral):  PER MD H&P  Patient is 79 y/o  female with pmhx of dyslipidemia, HTN, hypothyroidism, osteoarthritis, PVD who presented to ER in view of right sided weakness, lethargy and drowsiness. Patient was evaluated in ER in presence of patient's daughter. According to the daughter (primary history provider as patient is drowsy), her mother was in usual health until last night. This morning when she went to check on her mother, she looked very weak and complained of right sided weakness. She also wasn't answering questions appropriately. At baseline, she is pretty much active, independent with ADL. ROS could not be obtained, but patient denied having any chest pain or shortness of breath. In the ER, she also had new onset A. Fib with RVR, for which she was started on cardiazem drip after receiving a bolus. Her blood pressure was elevated SBP > 210, DBP > 100, which came down after one dose of IV hydralazine. During A.fib, she started frothing from the mouth, but no seizure like activity was noticed. Past Medical History/Comorbidities:   Ms. Mya Davies  has a past medical history of Anxiety (4/4/2017); Dementia (4/4/2017); Dizziness (4/4/2017); Hyperlipidemia (4/4/2017); Hypertension (4/4/2017); Hypothyroidism (4/4/2017); Osteoarthritis (4/4/2017); and PVD (peripheral vascular disease) ([facility] [address] (4/4/2017). Ms. Mya Davies  has a past surgical history that includes hysterectomy.   Social History/Living Environment:   Home Environment: Private residence  One/Two Story Residence: Two story  Living Alone: No  Support Systems: Child(keysha), Family member(s)  Patient Expects to be Discharged to[de-identified] Rehabilitation facility  Current DME Used/Available at Home: None Per patient daughter, patient lives with her and walk around the house only. Prior Level of Function/Work/Activity:  Limited to in house ambulation and mobility only per patient   Dominant Side:         RIGHT  Personal Factors:          Sex:  female        Age:  80 y.o. Number of Personal Factors/Comorbidities that affect the Plan of Care: 1-2: MODERATE COMPLEXITY   EXAMINATION:   Most Recent Physical Functioning:   Gross Assessment:                  Posture:     Balance:  Sitting: Impaired  Sitting - Static: Fair (occasional)  Sitting - Dynamic: Poor (constant support)  Standing: Impaired  Standing - Static: Constant support  Standing - Dynamic : Poor Bed Mobility:  Rolling: Minimum assistance  Supine to Sit: Maximum assistance;Assist x2  Scooting: Maximum assistance;Assist x2  Wheelchair Mobility:     Transfers:  Sit to Stand: Maximum assistance;Assist x2  Stand to Sit: Maximum assistance;Assist x2  Stand Pivot Transfers: Moderate assistance  Bed to Chair: Moderate assistance;Assist x2  Level of Assistance: Maximum assistance  Interventions: Verbal cues; Tactile cues; Safety awareness training;Weight shifting training/Pressure relief  Duration: 30 Minutes  Gait:     Base of Support: Widened  Speed/Radha: Slow;Shuffled  Step Length: Right shortened;Left shortened  Gait Abnormalities: Shuffling gait; Decreased step clearance; Hemiplegic  Distance (ft): 3 Feet (ft)  Ambulation - Level of Assistance: Moderate assistance;Assist x2  Interventions: Tactile cues; Verbal cues;Manual cues; Safety awareness training;Visual/Demos       Body Structures Involved:  1. Metabolic  2. Bones  3. Joints  4. Muscles  5. Ligaments Body Functions Affected:  1. Neuromusculoskeletal  2. Movement Related  3. Skin Related  4. Metobolic/Endocrine Activities and Participation Affected:  1. General Tasks and Demands  2. Mobility  3. Self Care  4.  Community, Social and [place]   Number of elements that affect the Plan of Care: 4+: HIGH COMPLEXITY   CLINICAL PRESENTATION:   Presentation: Evolving clinical presentation with changing clinical characteristics: MODERATE COMPLEXITY   CLINICAL DECISION MAKIN [facility] Mobility Inpatient Short Form  How much difficulty does the patient currently have. .. Unable A Lot A Little None   1. Turning over in bed (including adjusting bedclothes, sheets and blankets)? [ ] 1   [X] 2   [ ] 3   [ ] 4   2. Sitting down on and standing up from a chair with arms ( e.g., wheelchair, bedside commode, etc.)   [ ] 1   [X] 2   [ ] 3   [ ] 4   3. Moving from lying on back to sitting on the side of the bed? [ ] 1   [X] 2   [ ] 3   [ ] 4   How much help from another person does the patient currently need. .. Total A Lot A Little None   4. Moving to and from a bed to a chair (including a wheelchair)? [X] 1   [ ] 2   [ ] 3   [ ] 4   5. Need to walk in hospital room? [X] 1   [ ] 2   [ ] 3   [ ] 4   6. Climbing 3-5 steps with a railing? [X] 1   [ ] 2   [ ] 3   [ ] 4   © 2007, [employer] [place] [address], under license to [employer]. All rights reserved    Score:  Initial: 9 Most Recent: X (Date: -- )     Interpretation of Tool:  Represents activities that are increasingly more difficult (i.e. Bed mobility, Transfers, Gait). Score 24 23 22-20 19-15 14-10 9-7 6       Modifier CH CI CJ CK CL CM CN         · Mobility - Walking and Moving Around:               - CURRENT STATUS:    CM - 80%-99% impaired, limited or restricted               - GOAL STATUS:           CL - 60%-79% impaired, limited or restricted               - D/C STATUS:                       ---------------To be determined---------------  [place] / [place] / Product Type: Medicare /       Medical Necessity:     · Patient demonstrates fair rehab potential due to higher previous functional level.   Reason for Services/Other Comments:  · Patient continues to require skilled intervention due to medical complications, patient unable to attend/participate in therapy as expected and s/p CVA affecting right side. Use of outcome tool(s) and clinical judgement create a POC that gives a: Questionable prediction of patient's progress: MODERATE COMPLEXITY                 TREATMENT:   (In addition to Assessment/Re-Assessment sessions the following treatments were rendered)   Pre-treatment Symptoms/Complaints: \"Sit in the chair\"   Pain: Initial:   Pain Intensity 1: 0  Post Session:  0/10     Therapeutic Activity: (  30 Minutes ):  Therapeutic activities including Bed transfers, chair transfers, Ambulation on level ground and sitting/standing balance and sit to stand to improve mobility, strength, balance and coordination. Required moderate Tactile cues; Verbal cues;Manual cues; Safety awareness training;Visual/Demos to promote static and dynamic balance in sitting and promote motor control of bilateral, lower extremity(s). Performed AROM heel raises and AAROM LAQ on LLE. DATE: 9/26/17 4/27/17       Straight leg raise         Hip abduct/ adduct 2x5 AAL        Heel slides  2x5 AL, IA        Hip external/ internal rotation         Ankle dorsiflexion/ plantarflexion  1x5 AB       LAQ  1x4 AR, 1x2 AAL                           Key:  A=active, AA=active assisted, P=passive, B=bilaterally, R=right, L=left    Braces/Orthotics/Lines/Etc:   · IV, mantilla catheter and nasogastric tube   · Mitten L UE  Treatment/Session Assessment:    · Response to Treatment: Completed chair transfer to L side with decreased assistance   · Interdisciplinary Collaboration:  · Physical Therapist and Registered Nurse  · After treatment position/precautions:  · Up in chair, Bed/Chair-wheels locked, Bed in low position, Call light within reach and RN notified  · Compliance with Program/Exercises: Will assess as treatment progresses. · Recommendations/Intent for next treatment session:   \"Next visit will focus on advancements to more challenging activities, reduction in assistance provided and neuromuscular reeducation. \".   Total Treatment Duration:  PT Patient Time In/Time Out  Time In: 1305  Time Out: 172 Lucien Chiu

## 2017-04-27 NOTE — PROGRESS NOTES
Skin assessment completed by this RN and Rosemary Roberts. Scattered moles and scar on abdomen. Crust on right eye. Reddened area around anus with some breakdown, zinc paste applied. Turn pt Q 2 hr.

## 2017-04-28 ENCOUNTER — ANESTHESIA (OUTPATIENT)
Dept: ENDOSCOPY | Age: 82
DRG: 064 | End: 2017-04-28
Payer: MEDICARE

## 2017-04-28 LAB
ANION GAP BLD CALC-SCNC: 11 MMOL/L (ref 7–16)
APTT PPP: 31.8 SEC (ref 23.5–31.7)
BASOPHILS # BLD AUTO: 0 K/UL (ref 0–0.2)
BASOPHILS # BLD: 0 % (ref 0–2)
BUN SERPL-MCNC: 22 MG/DL (ref 8–23)
CALCIUM SERPL-MCNC: 8.5 MG/DL (ref 8.3–10.4)
CHLORIDE SERPL-SCNC: 106 MMOL/L (ref 98–107)
CO2 SERPL-SCNC: 26 MMOL/L (ref 21–32)
CREAT SERPL-MCNC: 0.6 MG/DL (ref 0.6–1)
DIFFERENTIAL METHOD BLD: ABNORMAL
EOSINOPHIL # BLD: 0.1 K/UL (ref 0–0.8)
EOSINOPHIL NFR BLD: 1 % (ref 0.5–7.8)
ERYTHROCYTE [DISTWIDTH] IN BLOOD BY AUTOMATED COUNT: 13.3 % (ref 11.9–14.6)
GLUCOSE SERPL-MCNC: 125 MG/DL (ref 65–100)
HCT VFR BLD AUTO: 44.3 % (ref 35.8–46.3)
HGB BLD-MCNC: 15 G/DL (ref 11.7–15.4)
IMM GRANULOCYTES # BLD: 0 K/UL (ref 0–0.5)
IMM GRANULOCYTES NFR BLD AUTO: 0.2 % (ref 0–5)
LYMPHOCYTES # BLD AUTO: 10 % (ref 13–44)
LYMPHOCYTES # BLD: 1 K/UL (ref 0.5–4.6)
MCH RBC QN AUTO: 29.2 PG (ref 26.1–32.9)
MCHC RBC AUTO-ENTMCNC: 33.9 G/DL (ref 31.4–35)
MCV RBC AUTO: 86.2 FL (ref 79.6–97.8)
MM INDURATION POC: 0 MM (ref 0–5)
MONOCYTES # BLD: 0.8 K/UL (ref 0.1–1.3)
MONOCYTES NFR BLD AUTO: 8 % (ref 4–12)
NEUTS SEG # BLD: 7.8 K/UL (ref 1.7–8.2)
NEUTS SEG NFR BLD AUTO: 81 % (ref 43–78)
PLATELET # BLD AUTO: 136 K/UL (ref 150–450)
PMV BLD AUTO: 13.9 FL (ref 10.8–14.1)
POTASSIUM SERPL-SCNC: 3.7 MMOL/L (ref 3.5–5.1)
PPD POC: NORMAL NEGATIVE
RBC # BLD AUTO: 5.14 M/UL (ref 4.05–5.25)
SODIUM SERPL-SCNC: 143 MMOL/L (ref 136–145)
WBC # BLD AUTO: 9.7 K/UL (ref 4.3–11.1)

## 2017-04-28 PROCEDURE — 74011250637 HC RX REV CODE- 250/637: Performed by: INTERNAL MEDICINE

## 2017-04-28 PROCEDURE — 74011000250 HC RX REV CODE- 250: Performed by: INTERNAL MEDICINE

## 2017-04-28 PROCEDURE — 76040000025: Performed by: INTERNAL MEDICINE

## 2017-04-28 PROCEDURE — 74011000250 HC RX REV CODE- 250

## 2017-04-28 PROCEDURE — 80048 BASIC METABOLIC PNL TOTAL CA: CPT | Performed by: HOSPITALIST

## 2017-04-28 PROCEDURE — 77030005122 HC CATH GASTMY PEG BSC -B: Performed by: INTERNAL MEDICINE

## 2017-04-28 PROCEDURE — 74011250637 HC RX REV CODE- 250/637: Performed by: HOSPITALIST

## 2017-04-28 PROCEDURE — 85730 THROMBOPLASTIN TIME PARTIAL: CPT | Performed by: HOSPITALIST

## 2017-04-28 PROCEDURE — 74011250636 HC RX REV CODE- 250/636: Performed by: INTERNAL MEDICINE

## 2017-04-28 PROCEDURE — 76060000031 HC ANESTHESIA FIRST 0.5 HR: Performed by: INTERNAL MEDICINE

## 2017-04-28 PROCEDURE — 74011250636 HC RX REV CODE- 250/636: Performed by: ANESTHESIOLOGY

## 2017-04-28 PROCEDURE — 74011250636 HC RX REV CODE- 250/636

## 2017-04-28 PROCEDURE — 0DH63UZ INSERTION OF FEEDING DEVICE INTO STOMACH, PERCUTANEOUS APPROACH: ICD-10-PCS | Performed by: INTERNAL MEDICINE

## 2017-04-28 PROCEDURE — 74011250637 HC RX REV CODE- 250/637: Performed by: PHYSICIAN ASSISTANT

## 2017-04-28 PROCEDURE — 65270000029 HC RM PRIVATE

## 2017-04-28 PROCEDURE — 0DJ08ZZ INSPECTION OF UPPER INTESTINAL TRACT, VIA NATURAL OR ARTIFICIAL OPENING ENDOSCOPIC: ICD-10-PCS | Performed by: INTERNAL MEDICINE

## 2017-04-28 PROCEDURE — 36415 COLL VENOUS BLD VENIPUNCTURE: CPT | Performed by: HOSPITALIST

## 2017-04-28 PROCEDURE — 85025 COMPLETE CBC W/AUTO DIFF WBC: CPT | Performed by: HOSPITALIST

## 2017-04-28 RX ORDER — LIDOCAINE HYDROCHLORIDE 20 MG/ML
INJECTION, SOLUTION EPIDURAL; INFILTRATION; INTRACAUDAL; PERINEURAL AS NEEDED
Status: DISCONTINUED | OUTPATIENT
Start: 2017-04-28 | End: 2017-04-28 | Stop reason: HOSPADM

## 2017-04-28 RX ORDER — CIPROFLOXACIN 500 MG/1
500 TABLET ORAL ONCE
Status: COMPLETED | OUTPATIENT
Start: 2017-04-28 | End: 2017-04-28

## 2017-04-28 RX ORDER — METRONIDAZOLE 500 MG/100ML
500 INJECTION, SOLUTION INTRAVENOUS
Status: COMPLETED | OUTPATIENT
Start: 2017-04-28 | End: 2017-04-28

## 2017-04-28 RX ORDER — PROPOFOL 10 MG/ML
INJECTION, EMULSION INTRAVENOUS
Status: DISCONTINUED | OUTPATIENT
Start: 2017-04-28 | End: 2017-04-28 | Stop reason: HOSPADM

## 2017-04-28 RX ORDER — SODIUM CHLORIDE, SODIUM LACTATE, POTASSIUM CHLORIDE, CALCIUM CHLORIDE 600; 310; 30; 20 MG/100ML; MG/100ML; MG/100ML; MG/100ML
1000 INJECTION, SOLUTION INTRAVENOUS CONTINUOUS
Status: DISCONTINUED | OUTPATIENT
Start: 2017-04-28 | End: 2017-04-28 | Stop reason: HOSPADM

## 2017-04-28 RX ADMIN — CEFAZOLIN 2 G: 1 INJECTION, POWDER, FOR SOLUTION INTRAMUSCULAR; INTRAVENOUS; PARENTERAL at 10:42

## 2017-04-28 RX ADMIN — PRAVASTATIN SODIUM 40 MG: 20 TABLET ORAL at 21:25

## 2017-04-28 RX ADMIN — METRONIDAZOLE 500 MG: 500 INJECTION, SOLUTION INTRAVENOUS at 11:37

## 2017-04-28 RX ADMIN — LIDOCAINE HYDROCHLORIDE 40 MG: 20 INJECTION, SOLUTION EPIDURAL; INFILTRATION; INTRACAUDAL; PERINEURAL at 11:35

## 2017-04-28 RX ADMIN — AMLODIPINE BESYLATE 10 MG: 10 TABLET ORAL at 21:25

## 2017-04-28 RX ADMIN — CIPROFLOXACIN HYDROCHLORIDE 500 MG: 500 TABLET, FILM COATED ORAL at 16:02

## 2017-04-28 RX ADMIN — AMIODARONE HYDROCHLORIDE 400 MG: 200 TABLET ORAL at 21:25

## 2017-04-28 RX ADMIN — Medication 10 ML: at 05:26

## 2017-04-28 RX ADMIN — SODIUM CHLORIDE, SODIUM LACTATE, POTASSIUM CHLORIDE, AND CALCIUM CHLORIDE 1000 ML: 600; 310; 30; 20 INJECTION, SOLUTION INTRAVENOUS at 10:42

## 2017-04-28 RX ADMIN — METOPROLOL TARTRATE 50 MG: 50 TABLET ORAL at 16:02

## 2017-04-28 RX ADMIN — PROPOFOL 160 MCG/KG/MIN: 10 INJECTION, EMULSION INTRAVENOUS at 11:35

## 2017-04-28 RX ADMIN — Medication 10 ML: at 21:25

## 2017-04-28 NOTE — PROGRESS NOTES
TRANSFER - OUT REPORT:    Verbal report given to Marylu salinas(name) on Pen Degroft  being transferred to 719(unit) for routine progression of care       Report consisted of patients Situation, Background, Assessment and   Recommendations(SBAR). Information from the following report(s) Procedure Summary was reviewed with the receiving nurse. Lines:   Peripheral IV 04/22/17 Left Forearm (Active)   Site Assessment Clean, dry, & intact 4/28/2017 10:37 AM   Phlebitis Assessment 0 4/28/2017 10:37 AM   Infiltration Assessment 0 4/28/2017 10:37 AM   Dressing Status Clean, dry, & intact 4/28/2017 10:37 AM   Dressing Type Transparent;Tape 4/28/2017 10:37 AM   Hub Color/Line Status Pink; Infusing 4/28/2017 10:37 AM   Alcohol Cap Used No 4/23/2017  7:29 AM        Opportunity for questions and clarification was provided.       Patient transported with:

## 2017-04-28 NOTE — PROGRESS NOTES
Date of Surgery Update:  Ap Cerna was seen and examined. History and physical has been reviewed. The patient has been examined.  There have been no significant clinical changes since the completion of the originally dated History and Physical.    Signed By: Mila Alberto MD     April 28, 2017 11:33 AM

## 2017-04-28 NOTE — ANESTHESIA PREPROCEDURE EVALUATION
Anesthetic History               Review of Systems / Medical History  Patient summary reviewed and pertinent labs reviewed    Pulmonary                   Neuro/Psych       CVA  Dementia     Cardiovascular    Hypertension        Dysrhythmias (with RVR now controlled and converted to sinus, on amio) : atrial fibrillation  PAD    Exercise tolerance: <4 METS  Comments: Elevated troponin with stroke   GI/Hepatic/Renal               Comments: Aspiration risk Endo/Other             Other Findings              Physical Exam    Airway  Mallampati: IV  TM Distance: 4 - 6 cm  Neck ROM: decreased range of motion   Mouth opening: Diminished (comment)    Comments: Unable to give an airway exam Cardiovascular    Rhythm: regular  Rate: normal         Dental    Dentition: Edentulous     Pulmonary      Decreased breath sounds: bilateral           Abdominal         Other Findings            Anesthetic Plan    ASA: 4  Anesthesia type: total IV anesthesia          Induction: Intravenous  Anesthetic plan and risks discussed with: Patient      Patient oriented to name only, trying to get a family member for consent

## 2017-04-28 NOTE — PROGRESS NOTES
20 Fr PEG inserted by Dr. Mode Salamanca and Dr. Mariama Acharya at this time. 3 cms noted at skin level. Antibiotic ointment applied and dressing secured with tape by Dr. Mode Salamanca. Tolerated well.

## 2017-04-28 NOTE — PROGRESS NOTES
OT Note:    OT attempted to see patient this morning for therapy. Pt off the floor for procedure at this time. OT will re-attempt to see patient at a later date/time.     Thanks,  Mason Gaines

## 2017-04-28 NOTE — PROGRESS NOTES
SPEECH PATHOLOGY NOTE:    Attempted to see patient this am.  Remains off the floor for PEG placement. Will re-attempt at a later time/date.     Carlee Abdi MS, SLP

## 2017-04-28 NOTE — PROGRESS NOTES
Ancef started in pre-op. No consent at this time; nurse trying to get in touch with family to obtain consent. Pt non-verbal and unable to answer questions.

## 2017-04-28 NOTE — PROGRESS NOTES
Problem: Nutrition Deficit  Goal: *Optimize nutritional status  Nutrition:  Reason for assessment: TF management  Assessment:   Food/Nutrition History: TF on hold this morning for G tube placement. Abdomen noted with hypoactive bowel sounds, Date of last BM: 4/25. Labs unremarkable for Na trending down to 143 with d/c Zosyn, K 3.7, glucose 125. Anthropometrics:Height: 5' (152.4 cm), Weight Source: Bed, Weight: 52.2 kg (115 lb), Body mass index is 22.46 kg/(m^2). BMI class of underweight for age >65 years. Macronutrient Needs:  Estimated calorie needs - 7595-5218 aline/day (25-28 aline/kg/day)   Estimated protein needs - 54-70 gm pro/day (1.2-1.3 gm pro/kgIBW/day) (GFR >60 ml/min)  Max CHO/day - 210 gm CHO/day (60% aline/day)   Fluid/day - 1.3-1.4 liters/day (1 ml/alien/day)  Intake/Comparative standards: Currently NPO. Intervention:  Meals and snacks: NPO  EN: Once G tube is placed and medically appropriate to begin tube feeding via G tube, restart TF of Jevity 1.2 at 20ml/hr, increase by 10 ml q6 to goal rate of 50 ml/hr; provide 25 ml q1 water flushes - 1440 calories/day (100% calorie goal), 67 grams protein/day (100% protein goal), 203 grams CHO/day (does not exceed max CHO limit) and 1572 ml water/day (100% fluid goal). Nutrition Supplement Therapy: Electrolyte replacement per nutritional support protocols are active on MAR.   Coordination of nutrition care: Robin Arthur, [address], [address], [address] [place] [address], [facility] [address] [place] [address], [phone]

## 2017-04-28 NOTE — PROGRESS NOTES
TRANSFER - IN REPORT:    Verbal report received from [facility],[address], RN (name) on Pen Degroft  being received from room 719 (unit) for ordered procedure. Report consisted of patients Situation, Background, Assessment and   Recommendations(SBAR). Information from the following report(s) SBAR was reviewed with the receiving nurse. Opportunity for questions and clarification was provided. Awaiting transport to bring pt to the GI Lab at this time.

## 2017-04-28 NOTE — PROGRESS NOTES
Hospitalist Progress Note    2017  12:48 PM  Admit Date: 2017  8:54 AM   NAME: Ap Cerna   :  1935   MRN:  [id number]   Attending: Tayla Gonzáles MD  PCP:  Phyllis Rockwell MD  Treatment Team: Attending Provider: Tayla Gonzáles MD; Care Manager: Kait Reyna RN; Physical Therapist: Ming Morales PT; Utilization Review: Darinel Felder; Consulting Provider: Rizwana Kenny MD; Care Manager: Cristiano Ye [facility] Charge Nurse: Gil Briggs RN  SUBJECTIVE:       Mr. Kitty Arora is a 79 yo female admitted with afib with RVR and new left basal ganglia CVA. Managed on tele, seen by cardiology, on IV amiodarone and now recommended for oral amiodarone 400 mg BID for 2 weeks, then 200 mg BID for 2 weeks then 200 mg daily. She has converted to NSR. She was on heparin drip that was stopped with nasal bleeding but no evidence of hemorrhagic conversion. She has been seen by neurology with plans for asa/plavix for 2 weeks then conversion to full anticoagulation with either coumadin or NOAC. She has ECOLI UTI, day 7 antibiotics, quinolone sensitive. She has required NGT for feeds/meds-plans for PEG on , will need SNF.  SW assisting       17 no family present, patient resting with eyes closed, does not respond to voice/ command        Recent Results (from the past 24 hour(s))   PLEASE READ & DOCUMENT PPD TEST IN 48 HRS    Collection Time: 17  2:41 PM   Result Value Ref Range    PPD neg Negative    mm Induration 0 mm   PTT    Collection Time: 17  6:09 AM   Result Value Ref Range    aPTT 31.8 (H) 23.5 - 31.7 SEC   CBC WITH AUTOMATED DIFF    Collection Time: 17  6:09 AM   Result Value Ref Range    WBC 9.7 4.3 - 11.1 K/uL    RBC 5.14 4.05 - 5.25 M/uL    HGB 15.0 11.7 - 15.4 g/dL    HCT 44.3 35.8 - 46.3 %    MCV 86.2 79.6 - 97.8 FL    MCH 29.2 26.1 - 32.9 PG    MCHC 33.9 31.4 - 35.0 g/dL    RDW 13.3 11.9 - 14.6 %    PLATELET 200 (L) 365 - 450 K/uL    MPV 13.9 10.8 - 14.1 FL    DF AUTOMATED      NEUTROPHILS 81 (H) 43 - 78 %    LYMPHOCYTES 10 (L) 13 - 44 %    MONOCYTES 8 4.0 - 12.0 %    EOSINOPHILS 1 0.5 - 7.8 %    BASOPHILS 0 0.0 - 2.0 %    IMMATURE GRANULOCYTES 0.2 0.0 - 5.0 %    ABS. NEUTROPHILS 7.8 1.7 - 8.2 K/UL    ABS. LYMPHOCYTES 1.0 0.5 - 4.6 K/UL    ABS. MONOCYTES 0.8 0.1 - 1.3 K/UL    ABS. EOSINOPHILS 0.1 0.0 - 0.8 K/UL    ABS. BASOPHILS 0.0 0.0 - 0.2 K/UL    ABS. IMM.  GRANS. 0.0 0.0 - 0.5 K/UL   METABOLIC PANEL, BASIC    Collection Time: 04/28/17  6:09 AM   Result Value Ref Range    Sodium 143 136 - 145 mmol/L    Potassium 3.7 3.5 - 5.1 mmol/L    Chloride 106 98 - 107 mmol/L    CO2 26 21 - 32 mmol/L    Anion gap 11 7 - 16 mmol/L    Glucose 125 (H) 65 - 100 mg/dL    BUN 22 8 - 23 MG/DL    Creatinine 0.60 0.6 - 1.0 MG/DL    GFR est AA >60 >60 ml/min/1.73m2    GFR est non-AA >60 >60 ml/min/1.73m2    Calcium 8.5 8.3 - 10.4 MG/DL       No Known Allergies  Current Facility-Administered Medications   Medication Dose Route Frequency Provider Last Rate Last Dose    ciprofloxacin HCl (CIPRO) tablet 500 mg  500 mg Oral Q12H Abby Paz MD   Stopped at 04/28/17 0900    amLODIPine (NORVASC) tablet 10 mg  10 mg Oral QHS Guzman Wallace MD   10 mg at 04/27/17 2134    lisinopril (PRINIVIL, ZESTRIL) tablet 40 mg  40 mg Oral DAILY MIRELLA Gore   Stopped at 04/28/17 0900    famotidine (PEPCID) tablet 20 mg  20 mg Oral DAILY Sosa Gorman MD   Stopped at 04/28/17 0900    LORazepam (ATIVAN) injection 1 mg  1 mg IntraVENous Q6H PRN Belén Gonzalez MD   1 mg at 04/25/17 0100    polyvinyl alcohol (LIQUIFILM TEARS) 1.4 % ophthalmic solution 1 Drop  1 Drop Both Eyes PRN Sosa Gorman MD   1 Drop at 04/25/17 1437    metoprolol tartrate (LOPRESSOR) tablet 50 mg  50 mg Oral BID Brett Danielson MD   Stopped at 04/28/17 0900    aspirin chewable tablet 81 mg  81 mg Oral DAILY Mukesh Sim MD   Stopped at 04/28/17 0900    LORazepam (ATIVAN) injection 1 mg  1 mg IntraVENous ONCE PRN Bill Hirsch MD        amiodarone (CORDARONE) tablet 400 mg  400 mg Oral BID MIRELLA Stokes   Stopped at 17 0900    hydrALAZINE (APRESOLINE) 20 mg/mL injection 20 mg  20 mg IntraVENous Q4H PRN Maxine Santiago MD   20 mg at 17 0803    metoprolol (LOPRESSOR) injection 5 mg  5 mg IntraVENous Q4H PRN Bill Hirsch MD   5 mg at 17 2877    levothyroxine (SYNTHROID) tablet 50 mcg  50 mcg Oral ACB Ronald Fragoso MD   Stopped at 17 0730    pravastatin (PRAVACHOL) tablet 40 mg  40 mg Oral QHS Ronald Fragoso MD   40 mg at 17 2134    sodium chloride (NS) flush 5-10 mL  5-10 mL IntraVENous Q8H Ronald Fragoso MD   10 mL at 17 0526    sodium chloride (NS) flush 5-10 mL  5-10 mL IntraVENous PRN Ronald Fragoso MD        acetaminophen (TYLENOL) tablet 650 mg  650 mg Oral Q4H PRN Ronald Fragoso MD   650 mg at 17 1728           Review of Systems as noted above in HPI   PHYSICAL EXAM     Visit Vitals    /74    Pulse 79    Temp 98.6 °F (37 °C)    Resp 16    Ht 5' (1.524 m)    Wt 52.2 kg (115 lb)    SpO2 99%    BMI 22.46 kg/m2      Temp (24hrs), Av.3 °F (36.8 °C), Min:97.9 °F (36.6 °C), Max:98.6 °F (37 °C)    Oxygen Therapy  O2 Sat (%): 99 % (17 1219)  Pulse via Oximetry: 80 beats per minute (17 1219)  O2 Device: Room air (17 1219)  O2 Flow Rate (L/min): 2 l/min (17 1205)    Intake/Output Summary (Last 24 hours) at 17 1248  Last data filed at 17 1145   Gross per 24 hour   Intake              200 ml   Output             1250 ml   Net            -1050 ml      General: No acute distress,resting  Lungs:  CTAB, good effort anterior exam  Heart:  Regular rate and rhythm, no murmur, no edema   Abdomen: Soft, nontender and nondistended, normal bowel sounds  Extremities: Warm and dry         DIAGNOSTIC STUDIES      Labs and Studies from previous 24 hours have been personally reviewed by myself           Full Code    ASSESSMENT / Barrington Parra 169 Problems    Diagnosis Date Noted    Elevated troponin 04/24/2017    Cerebrovascular accident (CVA) due to occlusion of precerebral artery ([facility] [address] 04/24/2017    Hypertensive urgency 04/22/2017    Paroxysmal atrial fibrillation ([facility] [address] 04/22/2017     -appreciate cardio and neuro recommendations   -continue asa/plavix and change to anticoagulation in 2 weeks -plavix currently on hold for PEG  -changed zosyn to cipro day 7/7 for UTI  -PEG today  -SW for SNF pending    FEN:NGT  DVT Prophylaxis: SCD  Disposition:pending  Time spent with patient:  Care plan addressed: daughter   Risk Assessment:  Signed By: Kathy Hendricks MD     April 28, 2017

## 2017-04-28 NOTE — ANESTHESIA POSTPROCEDURE EVALUATION
Post-Anesthesia Evaluation and Assessment    Patient: Gladis Mccray MRN: [id number]  SSN: [id number]    YOB: 1935  Age: 80 y.o. Sex: female       Cardiovascular Function/Vital Signs  Visit Vitals    /58    Pulse 78    Temp 37 °C (98.6 °F)    Resp 16    Ht 5' (1.524 m)    Wt 52.2 kg (115 lb)    SpO2 99%    BMI 22.46 kg/m2       Patient is status post total IV anesthesia anesthesia for Procedure(s):  ESOPHAGOGASTRODUODENOSCOPY (EGD)/ BMI= 23 / ROOM 719  PERCUTANEOUS ENDOSCOPIC GASTROSTOMY TUBE INSERTION. Nausea/Vomiting: None    Postoperative hydration reviewed and adequate. Pain:  Pain Scale 1: Visual (04/28/17 1149)  Pain Intensity 1: 0 (04/28/17 1149)   Managed    Neurological Status:   Neuro  Neurologic State: Drowsy; Lethargic;Eyes open to voice (04/28/17 7603)  Orientation Level: Unable to verbalize (04/28/17 0286)  Cognition: Decreased attention/concentration;Decreased command following; Impaired decision making;Poor safety awareness (04/28/17 5285)  Speech: Incomprehensible words (04/28/17 5895)  Assessment L Pupil: Round (04/27/17 1941)  Size L Pupil (mm): 3 (04/27/17 1941)  Assessment R Pupil: Round (04/27/17 1941)  Size R Pupil (mm): 3 (04/27/17 1941)  LUE Motor Response: Purposeful (04/27/17 1941)  LLE Motor Response: Purposeful (04/27/17 1941)  RUE Motor Response: Flaccid (04/28/17 9995)  RLE Motor Response: Weak (04/28/17 1742)   At baseline    Mental Status and Level of Consciousness: at baseline, minimally responsive    Pulmonary Status:   O2 Device: Nasal cannula (04/28/17 1155)   Adequate oxygenation and airway patent    Complications related to anesthesia: None    Post-anesthesia assessment completed.  No immediate concerns    Signed By: Jamal Bhardwaj MD     April 28, 2017

## 2017-04-28 NOTE — PROCEDURES
ESOPHAGOGASTRODUODENOSCOPY    DATE of PROCEDURE: 4/28/2017    PT NAME: Ap Cerna     [id number]    MEDICATION:  MAC    INSTRUMENT: GIFQ  190    SPECIAL PROCEDURE: PEG   BLOOD LOSS- 0 to min. SPEC- no  IMPLANT- PEG    ENDOSCOPIST:Dr. Domenico Saunders    SURGEON: Dr. Renetta Berumen:  Standard EGD with PEG via standard Pontsky technique     ASSESSMENT:    1. PEG placed w/o difficulty  2. Gastric and duodenal erosions    PLAN:   1. OK to use PEG for meds today  2.  F/U in AM by Dr. Kyleigh Skinner MD

## 2017-04-28 NOTE — PROGRESS NOTES
[facility] SW will follow up with pt's dtr tomorrow to discuss other facility choices.

## 2017-04-29 LAB
ANION GAP BLD CALC-SCNC: 6 MMOL/L (ref 7–16)
BASOPHILS # BLD AUTO: 0 K/UL (ref 0–0.2)
BASOPHILS # BLD: 0 % (ref 0–2)
BUN SERPL-MCNC: 21 MG/DL (ref 8–23)
CALCIUM SERPL-MCNC: 8.3 MG/DL (ref 8.3–10.4)
CHLORIDE SERPL-SCNC: 105 MMOL/L (ref 98–107)
CO2 SERPL-SCNC: 29 MMOL/L (ref 21–32)
CREAT SERPL-MCNC: 0.57 MG/DL (ref 0.6–1)
DIFFERENTIAL METHOD BLD: ABNORMAL
EOSINOPHIL # BLD: 0.1 K/UL (ref 0–0.8)
EOSINOPHIL NFR BLD: 1 % (ref 0.5–7.8)
ERYTHROCYTE [DISTWIDTH] IN BLOOD BY AUTOMATED COUNT: 13.4 % (ref 11.9–14.6)
GLUCOSE SERPL-MCNC: 85 MG/DL (ref 65–100)
HCT VFR BLD AUTO: 41.6 % (ref 35.8–46.3)
HGB BLD-MCNC: 13.8 G/DL (ref 11.7–15.4)
IMM GRANULOCYTES # BLD: 0 K/UL (ref 0–0.5)
IMM GRANULOCYTES NFR BLD AUTO: 0.4 % (ref 0–5)
LYMPHOCYTES # BLD AUTO: 11 % (ref 13–44)
LYMPHOCYTES # BLD: 1.1 K/UL (ref 0.5–4.6)
MCH RBC QN AUTO: 28.9 PG (ref 26.1–32.9)
MCHC RBC AUTO-ENTMCNC: 33.2 G/DL (ref 31.4–35)
MCV RBC AUTO: 87.2 FL (ref 79.6–97.8)
MONOCYTES # BLD: 1 K/UL (ref 0.1–1.3)
MONOCYTES NFR BLD AUTO: 11 % (ref 4–12)
NEUTS SEG # BLD: 7.1 K/UL (ref 1.7–8.2)
NEUTS SEG NFR BLD AUTO: 77 % (ref 43–78)
PLATELET # BLD AUTO: 146 K/UL (ref 150–450)
PMV BLD AUTO: 14.1 FL (ref 10.8–14.1)
POTASSIUM SERPL-SCNC: 3.7 MMOL/L (ref 3.5–5.1)
RBC # BLD AUTO: 4.77 M/UL (ref 4.05–5.25)
SODIUM SERPL-SCNC: 140 MMOL/L (ref 136–145)
WBC # BLD AUTO: 9.3 K/UL (ref 4.3–11.1)

## 2017-04-29 PROCEDURE — 74011250637 HC RX REV CODE- 250/637: Performed by: PHYSICIAN ASSISTANT

## 2017-04-29 PROCEDURE — 74011250637 HC RX REV CODE- 250/637: Performed by: HOSPITALIST

## 2017-04-29 PROCEDURE — 36415 COLL VENOUS BLD VENIPUNCTURE: CPT | Performed by: INTERNAL MEDICINE

## 2017-04-29 PROCEDURE — 74011250637 HC RX REV CODE- 250/637: Performed by: INTERNAL MEDICINE

## 2017-04-29 PROCEDURE — 97530 THERAPEUTIC ACTIVITIES: CPT

## 2017-04-29 PROCEDURE — 80048 BASIC METABOLIC PNL TOTAL CA: CPT | Performed by: INTERNAL MEDICINE

## 2017-04-29 PROCEDURE — 65270000029 HC RM PRIVATE

## 2017-04-29 PROCEDURE — 85025 COMPLETE CBC W/AUTO DIFF WBC: CPT | Performed by: INTERNAL MEDICINE

## 2017-04-29 RX ADMIN — AMIODARONE HYDROCHLORIDE 400 MG: 200 TABLET ORAL at 21:57

## 2017-04-29 RX ADMIN — METOPROLOL TARTRATE 50 MG: 50 TABLET ORAL at 09:07

## 2017-04-29 RX ADMIN — Medication 10 ML: at 04:56

## 2017-04-29 RX ADMIN — AMLODIPINE BESYLATE 10 MG: 10 TABLET ORAL at 21:57

## 2017-04-29 RX ADMIN — Medication 10 ML: at 21:57

## 2017-04-29 RX ADMIN — AMIODARONE HYDROCHLORIDE 400 MG: 200 TABLET ORAL at 09:07

## 2017-04-29 RX ADMIN — FAMOTIDINE 20 MG: 20 TABLET ORAL at 09:08

## 2017-04-29 RX ADMIN — ASPIRIN 81 MG: 81 TABLET, CHEWABLE ORAL at 09:07

## 2017-04-29 RX ADMIN — PRAVASTATIN SODIUM 40 MG: 20 TABLET ORAL at 21:57

## 2017-04-29 RX ADMIN — LISINOPRIL 40 MG: 20 TABLET ORAL at 09:07

## 2017-04-29 RX ADMIN — LEVOTHYROXINE SODIUM 50 MCG: 50 TABLET ORAL at 04:56

## 2017-04-29 RX ADMIN — METOPROLOL TARTRATE 50 MG: 50 TABLET ORAL at 17:02

## 2017-04-29 NOTE — PROGRESS NOTES
SW met with pt/dtr to discuss choice of subacute facilities. Dtr [facility], [facility] and [facility]. Referral submitted to [facility] requesting a bed for Monday. SW provided her with the list of local facilities to review in the event these facilities are unable to accept the pt. PPD placed 4/25/17 and is complete. SW following to facilitate pt's transfer to rehab at discharge.

## 2017-04-29 NOTE — PROGRESS NOTES
Problem: Nutrition Deficit  Goal: *Optimize nutritional status  Nutrition F/U:  Nutrition Consult for TF Management. (Dr. Cherri Coyne)  Assessment:  Weight 52.2 kg (3rd floor telemetry 4/26/17), edema - none. The patient had a PEG placed yesterday and it has been evaluated by GI and deemed ready to use for nutrition support. Labs are unremarkable; AM glucose 85, reflective of NPO status with no dextrose-containing IVF support. Last bowel movement today. Semi-soft abdomen with hypoactive bowel sounds reported. Macronutrient Needs:  Estimated calorie needs - 8736-6001 aline/day (25-28 aline/kg/day)   Estimated protein needs - 54-70 gm pro/day (1.2-1.3 gm pro/kgIBW/day) (GFR >60 ml/min)  Max CHO/day - 210 gm CHO/day (60% aline/day)   Fluid/day - 1.3-1.4 liters/day (1 ml/aline/day)  Intake/Comparative Standards:  NPO for the last day for PEG placement. She was previously tolerating TF at its goal rate -  1440 calories/day (100% calorie goal), 67 grams protein/day (100% protein goal), 203 grams CHO/day (does not exceed max CHO limit) and 1572 ml water/day (>100% fluid goal). Intervention:   Meals and Snacks: NPO. Enteral Nutrition: Resume prior TF - Jevity 1.2 with the goal rate 50 ml/hr and 25 ml/hr water flush - as above. Lindsey Hidalgo  [phone]

## 2017-04-29 NOTE — PROGRESS NOTES
.  [facility] Consult Note             Date: 4/29/2017    GI Problem: PEG    History of Present Illness:  Patient is a 80 y.o. female who is seen in consultation for PEG placement yesterday. Today PEG looks c/d/i. Hospital Medications:  Current Facility-Administered Medications   Medication Dose Route Frequency    amLODIPine (NORVASC) tablet 10 mg  10 mg Oral QHS    lisinopril (PRINIVIL, ZESTRIL) tablet 40 mg  40 mg Oral DAILY    famotidine (PEPCID) tablet 20 mg  20 mg Oral DAILY    LORazepam (ATIVAN) injection 1 mg  1 mg IntraVENous Q6H PRN    polyvinyl alcohol (LIQUIFILM TEARS) 1.4 % ophthalmic solution 1 Drop  1 Drop Both Eyes PRN    metoprolol tartrate (LOPRESSOR) tablet 50 mg  50 mg Oral BID    aspirin chewable tablet 81 mg  81 mg Oral DAILY    LORazepam (ATIVAN) injection 1 mg  1 mg IntraVENous ONCE PRN    amiodarone (CORDARONE) tablet 400 mg  400 mg Oral BID    hydrALAZINE (APRESOLINE) 20 mg/mL injection 20 mg  20 mg IntraVENous Q4H PRN    metoprolol (LOPRESSOR) injection 5 mg  5 mg IntraVENous Q4H PRN    levothyroxine (SYNTHROID) tablet 50 mcg  50 mcg Oral ACB    pravastatin (PRAVACHOL) tablet 40 mg  40 mg Oral QHS    sodium chloride (NS) flush 5-10 mL  5-10 mL IntraVENous Q8H    sodium chloride (NS) flush 5-10 mL  5-10 mL IntraVENous PRN    acetaminophen (TYLENOL) tablet 650 mg  650 mg Oral Q4H PRN       Objective:     Physical Exam:  Vitals:  Visit Vitals    /48 (BP 1 Location: Left arm, BP Patient Position: At rest)    Pulse 65    Temp 97.8 °F (36.6 °C)    Resp 16    Ht 5' (1.524 m)    Wt 52.2 kg (115 lb)    SpO2 98%    BMI 22.46 kg/m2       General: No acute distress. Skin:  Extremities and face reveal no rashes. No kee erythema. No telangiectasias on the chest wall. HEENT: Sclerae anicteric. No oral ulcers. No abnormal pigmentation of the lips. The neck is supple. Cardiovascular: Regular rate and rhythm.  No murmurs, gallops, or rubs.  Respiratory:  Comfortable breathing  With no accessory muscle use. Clear breath sounds with no wheezes, rales, or rhonchi. GI:  Abdomen nondistended, soft, and nontender. Normal active bowel sounds. No enlargement of the liver or spleen. No masses palpable. Musculoskeletal:  No pitting edema of the lower legs. Extremities have good range of motion. Neurological:  Gross memory appears intact. Patient is alert and oriented. Psychiatric:  Mood appears appropriate with judgement intact. Lymphatic:  No cervical or supraclavicular adenopathy. Laboratory:    Recent Labs      04/29/17   0523   WBC  9.3   RBC  4.77   HGB  13.8   HCT  41.6   PLT  146*      Recent Labs      04/29/17   0523   GLU  85   NA  140   K  3.7   CL  105   CO2  29   BUN  21   CREA  0.57*   CA  8.3     Recent Labs      04/28/17   0609   APTT  31.8*     No results for input(s): TBIL, CBIL, SGOT, GPT, AP, ALB, TP, AML, LPSE in the last 72 hours.     Assessment:       A 80 y.o. female with new PEG      Plan:       OK to use PEG  Consulting nutrition about tube feeds      Signed By: Raghav Morales MD     April 29, 2017

## 2017-04-29 NOTE — PROGRESS NOTES
Hospitalist Progress Note    2017  1:30 PM  Admit Date: 2017  8:54 AM   NAME: Ap Cerna   :  1935   MRN:  [id number]   Attending: Catina Khan MD  PCP:  Jayant Gibbs MD  Treatment Team: Attending Provider: Catina Khan MD; Care Manager: Giovanna Eduardo, RN; Physical Therapist: Connie Reyes PT; Utilization Review: [facility]; Care Manager: Tamie Chowdhury LM  SUBJECTIVE:       Ms. Rachel Jeff is a 79 yo female admitted with afib with RVR and new left basal ganglia CVA. seen by cardiology, on IV amiodarone and now recommended for oral amiodarone 400 mg BID for 2 weeks, then 200 mg BID for 2 weeks then 200 mg daily. She has converted to NSR. She was on heparin drip that was stopped with nasal bleeding but no evidence of hemorrhagic conversion. She has been seen by neurology with plans for asa/plavix for 2 weeks then conversion to full anticoagulation with either coumadin or NOAC. She has ECOLI UTI, completed day 7 antibiotics, quinolone sensitive. She required NGT for feeds/meds-GI placed  PEG on , will need SNF.  SW assisting       17 daughter at bedside, patient resting with eyes closed, does not respond to voice/ command  , daughter notes mothers speech is difficult to understand, speaks Shonna       Recent Results (from the past 24 hour(s))   PLEASE READ & DOCUMENT PPD TEST IN 72 HRS    Collection Time: 17  3:00 PM   Result Value Ref Range    PPD neg Negative    mm Induration 0 mm   CBC WITH AUTOMATED DIFF    Collection Time: 17  5:23 AM   Result Value Ref Range    WBC 9.3 4.3 - 11.1 K/uL    RBC 4.77 4.05 - 5.25 M/uL    HGB 13.8 11.7 - 15.4 g/dL    HCT 41.6 35.8 - 46.3 %    MCV 87.2 79.6 - 97.8 FL    MCH 28.9 26.1 - 32.9 PG    MCHC 33.2 31.4 - 35.0 g/dL    RDW 13.4 11.9 - 14.6 %    PLATELET 435 (L) 258 - 450 K/uL    MPV 14.1 10.8 - 14.1 FL    DF AUTOMATED      NEUTROPHILS 77 43 - 78 %    LYMPHOCYTES 11 (L) 13 - 44 %    MONOCYTES 11 4.0 - 12.0 %    EOSINOPHILS 1 0.5 - 7.8 %    BASOPHILS 0 0.0 - 2.0 %    IMMATURE GRANULOCYTES 0.4 0.0 - 5.0 %    ABS. NEUTROPHILS 7.1 1.7 - 8.2 K/UL    ABS. LYMPHOCYTES 1.1 0.5 - 4.6 K/UL    ABS. MONOCYTES 1.0 0.1 - 1.3 K/UL    ABS. EOSINOPHILS 0.1 0.0 - 0.8 K/UL    ABS. BASOPHILS 0.0 0.0 - 0.2 K/UL    ABS. IMM.  GRANS. 0.0 0.0 - 0.5 K/UL   METABOLIC PANEL, BASIC    Collection Time: 04/29/17  5:23 AM   Result Value Ref Range    Sodium 140 136 - 145 mmol/L    Potassium 3.7 3.5 - 5.1 mmol/L    Chloride 105 98 - 107 mmol/L    CO2 29 21 - 32 mmol/L    Anion gap 6 (L) 7 - 16 mmol/L    Glucose 85 65 - 100 mg/dL    BUN 21 8 - 23 MG/DL    Creatinine 0.57 (L) 0.6 - 1.0 MG/DL    GFR est AA >60 >60 ml/min/1.73m2    GFR est non-AA >60 >60 ml/min/1.73m2    Calcium 8.3 8.3 - 10.4 MG/DL       No Known Allergies  Current Facility-Administered Medications   Medication Dose Route Frequency Provider Last Rate Last Dose    amLODIPine (NORVASC) tablet 10 mg  10 mg Oral QHS Ashok Saldaña MD   10 mg at 04/28/17 2125    lisinopril (PRINIVIL, ZESTRIL) tablet 40 mg  40 mg Oral DAILY MIRELLA Marte   40 mg at 04/29/17 7678    famotidine (PEPCID) tablet 20 mg  20 mg Oral DAILY Chavo Chun MD   20 mg at 04/29/17 0908    LORazepam (ATIVAN) injection 1 mg  1 mg IntraVENous Q6H PRN Dayo Kent MD   1 mg at 04/25/17 0100    polyvinyl alcohol (LIQUIFILM TEARS) 1.4 % ophthalmic solution 1 Drop  1 Drop Both Eyes PRN Chavo Chun MD   1 Drop at 04/25/17 1437    metoprolol tartrate (LOPRESSOR) tablet 50 mg  50 mg Oral BID Jenelle Blakely MD   50 mg at 04/29/17 0142    aspirin chewable tablet 81 mg  81 mg Oral DAILY Jenelle Player, MD   81 mg at 04/29/17 6224    LORazepam (ATIVAN) injection 1 mg  1 mg IntraVENous ONCE PRN Jenelle Blakely MD        amiodarone (CORDARONE) tablet 400 mg  400 mg Oral BID MIRELLA Marte   400 mg at 04/29/17 9700    hydrALAZINE (APRESOLINE) 20 mg/mL injection 20 mg  20 mg IntraVENous Q4H PRN Brandin Jones MD   20 mg at 17 0803    metoprolol (LOPRESSOR) injection 5 mg  5 mg IntraVENous Q4H PRN Ernestine Gifford MD   5 mg at 17 2285    levothyroxine (SYNTHROID) tablet 50 mcg  50 mcg Oral ACB Amina Wolff MD   50 mcg at 17 0456    pravastatin (PRAVACHOL) tablet 40 mg  40 mg Oral QHS Amina Wolff MD   40 mg at 17 2125    sodium chloride (NS) flush 5-10 mL  5-10 mL IntraVENous Q8H Amina Wolff MD   10 mL at 17 0456    sodium chloride (NS) flush 5-10 mL  5-10 mL IntraVENous PRN Amina Wolff MD        acetaminophen (TYLENOL) tablet 650 mg  650 mg Oral Q4H PRN Amina Wolff MD   650 mg at 17 1728           Review of Systems as noted above in HPI   PHYSICAL EXAM     Visit Vitals    /48 (BP 1 Location: Left arm, BP Patient Position: At rest)    Pulse 65    Temp 97.8 °F (36.6 °C)    Resp 16    Ht 5' (1.524 m)    Wt 52.2 kg (115 lb)    SpO2 98%    BMI 22.46 kg/m2      Temp (24hrs), Av.4 °F (36.9 °C), Min:97.8 °F (36.6 °C), Max:98.7 °F (37.1 °C)    Oxygen Therapy  O2 Sat (%): 98 % (17 1053)  Pulse via Oximetry: 80 beats per minute (17 1219)  O2 Device: Room air (17 1219)  O2 Flow Rate (L/min): 2 l/min (17 1205)    Intake/Output Summary (Last 24 hours) at 17 1330  Last data filed at 17 0201   Gross per 24 hour   Intake                0 ml   Output             1500 ml   Net            -1500 ml      General: No acute distress,resting  Lungs:  CTAB, good effort anterior exam  Heart:  Regular rate and rhythm, no murmur, no edema   Abdomen: Soft, nontender and nondistended, normal bowel sounds  Extremities: Warm and dry         DIAGNOSTIC STUDIES      Labs and Studies from previous 24 hours have been personally reviewed by myself           Full Code    ASSESSMENT / Barrington Parra 169 Problems    Diagnosis Date Noted    Elevated troponin 2017    Cerebrovascular accident (CVA) due to occlusion of precerebral artery ([facility] [address] 04/24/2017    Hypertensive urgency 04/22/2017    Paroxysmal atrial fibrillation ([facility] [address] 04/22/2017     -appreciate cardio and neuro recommendations   -continue asa/plavix and change to full anticoagulation in 2 weeks -plavix currently on hold for PEG and will discuss resumption with GI  -SW for SNF pending    FEN:NGT  DVT Prophylaxis: SCD  Disposition:pending  Time spent with patient:  Care plan addressed: daughter   Risk Assessment:  Signed By: Daisy Chua MD     April 29, 2017

## 2017-04-29 NOTE — PROGRESS NOTES
Problem: Mobility Impaired (Adult and Pediatric)  Goal: *Therapy Goal (Edit Goal, Insert Text)  Right sided hemiplegia  STG:  (1.)Ms. Nikolas Severino will move from supine to sit and sit to supine , scoot up and down and roll side to side with MODERATE ASSIST within 7 day(s). (2.)Ms. Nikolas Severino will transfer from bed to chair and chair to bed with MODERATE ASSIST using the least restrictive device within 7 day(s). (3.)Ms. Nikolas Severino will ambulate with MAXIMAL ASSIST for 5 feet with the least restrictive device within 7 day(s). LTG:  (1.)Ms. Nikolas Severino will move from supine to sit and sit to supine , scoot up and down and roll side to side in bed with MINIMAL ASSIST within 14 day(s). (2.)Ms. Nikolas Severino will transfer from bed to chair and chair to bed with MINIMAL ASSIST using the least restrictive device within 14 day(s). (3.)Ms. Nikolas Severino will ambulate with MODERATE ASSIST for 25 feet with the least restrictive device within 14 day(s). _______________________________________________________________________________________________  PHYSICAL THERAPY: Daily Note, Treatment Day: 4th and AM 4/29/2017  INPATIENT: Hospital Day: 8  [place] / Product Type: Medicare /      NAME/AGE/GENDER: Ap Severino is a 80 y.o. female         PRIMARY DIAGNOSIS: Other dysphagia [R13.19] Cerebrovascular accident (CVA) due to occlusion of precerebral artery ([address] Cerebrovascular accident (CVA) due to occlusion of precerebral artery ([address]  Procedure(s) (LRB):  ESOPHAGOGASTRODUODENOSCOPY (EGD)/ BMI= 23 / ROOM 719 (N/A)  PERCUTANEOUS ENDOSCOPIC GASTROSTOMY TUBE INSERTION (N/A)  1 Day Post-Op  ICD-10: Treatment Diagnosis:       · Paralytic gait (R26.1)  · Hemiplegia and hemiparesis following cerebral infarction affecting right dominant side (N67.005)   Precaution/Allergies:  Review of patient's allergies indicates no known allergies. ASSESSMENT:      Ms. Nikolas Severino presents in supine.  Flaccid RUE and decreased strength RLE; dysarthric. Mitt on LUE. Patient was supine upon contact and agreeable to PT. Patient minimally verbalizes today (only stated \"stand up\") with decreased command following. Treatment consisted of static/dynamic sitting/standing balance, posture training, transfer training, pre-gait activities, and functional activities including bed mobility, scooting, etc. Patient demonstrates fair static sitting balance and poor dynamic sitting balance. Attempted dynamic sitting balance training/exercises including reaching out for therapist hand, weight shifts in sitting, but patient did not follow commands for these activities. Patient did participate great in transfer training x 5 reps requiring min-mod assist and verbal/tactile/manual cues to improve transfer technique. Once standing patient demonstrates poor standing balance and posture requiring max verbal/tactlie/manual cues to improve. Unsafe to attempt transfer to chair or gait training with assist x 1 due to poor posture, balance, and weakness in RLE. Patient return to supine with min assist. Patient requires total assist x 2 to scoot up in the bed as patient did not follow commands for bridging. Overall good progress overall. Will continue efforts. This section established at most recent assessment   PROBLEM LIST (Impairments causing functional limitations):  1. Decreased Strength  2. Decreased ADL/Functional Activities  3. Decreased Transfer Abilities  4. Decreased Ambulation Ability/Technique  5. Decreased Balance  6. Decreased Activity Tolerance  7. Decreased Pacing Skills  8. Decreased Flexibility/Joint Mobility  9. Decreased [place] with Home Exercise Program    INTERVENTIONS PLANNED: (Benefits and precautions of physical therapy have been discussed with the patient.)  1. Balance Exercise  2. Bed Mobility  3. Family Education  4. Gait Training  5. Home Exercise Program (HEP)  6. Range of Motion (ROM)  7.  Therapeutic Activites  8. Therapeutic Exercise/Strengthening  9. Transfer Training  10. Neuromuscular reeducation      TREATMENT PLAN: Frequency/Duration: 3 times a week for duration of hospital stay  Rehabilitation Potential For Stated Goals: GOOD      RECOMMENDED REHABILITATION/EQUIPMENT: (at time of discharge pending progress): Continue Skilled Therapy and and to be determined at medical discharge. HISTORY:   History of Present Injury/Illness (Reason for Referral):  PER MD H&P  Patient is 81 y/o  female with pmhx of dyslipidemia, HTN, hypothyroidism, osteoarthritis, PVD who presented to ER in view of right sided weakness, lethargy and drowsiness. Patient was evaluated in ER in presence of patient's daughter. According to the daughter (primary history provider as patient is drowsy), her mother was in usual health until last night. This morning when she went to check on her mother, she looked very weak and complained of right sided weakness. She also wasn't answering questions appropriately. At baseline, she is pretty much active, independent with ADL. ROS could not be obtained, but patient denied having any chest pain or shortness of breath. In the ER, she also had new onset A. Fib with RVR, for which she was started on cardiazem drip after receiving a bolus. Her blood pressure was elevated SBP > 210, DBP > 100, which came down after one dose of IV hydralazine. During A.fib, she started frothing from the mouth, but no seizure like activity was noticed. Past Medical History/Comorbidities:   Ms. Joe Barrientos  has a past medical history of Anxiety (4/4/2017); Dementia (4/4/2017); Dizziness (4/4/2017); Hyperlipidemia (4/4/2017); Hypertension (4/4/2017); Hypothyroidism (4/4/2017); Osteoarthritis (4/4/2017); and PVD (peripheral vascular disease) ([facility] (4/4/2017). Ms. Joe Barrientos  has a past surgical history that includes hysterectomy and endoscopy (2017).   Social History/Living Environment:   Home Environment: Private residence  One/Two Story Residence: Two story  Living Alone: No  Support Systems: Child(keysha), Family member(s)  Patient Expects to be Discharged to[de-identified] Rehabilitation facility  Current DME Used/Available at Home: None Per patient daughter, patient lives with her and walk around the house only. Prior Level of Function/Work/Activity:  Limited to in house ambulation and mobility only per patient   Dominant Side:         RIGHT  Personal Factors:          Sex:  female        Age:  80 y.o. Number of Personal Factors/Comorbidities that affect the Plan of Care: 1-2: MODERATE COMPLEXITY   EXAMINATION:   Most Recent Physical Functioning:   Gross Assessment:                  Posture:     Balance:  Sitting: Impaired  Sitting - Static: Fair (occasional)  Sitting - Dynamic: Poor (constant support)  Standing: Impaired  Standing - Static: Poor;Constant support  Standing - Dynamic : Poor Bed Mobility:  Supine to Sit: Maximum assistance  Sit to Supine: Minimum assistance  Scooting: Total assistance (to scoot hips to EOB; total assist x 2 to scoot up in bed)  Wheelchair Mobility:     Transfers:  Sit to Stand: Moderate assistance;Minimum assistance  Stand to Sit: Moderate assistance;Minimum assistance  Stand Pivot Transfers:  (unsafe to attempt with assist x 1)  Bed to Chair:  (unsafe to attempt with assist x 1)  Gait:             Body Structures Involved:  1. Metabolic  2. Bones  3. Joints  4. Muscles  5. Ligaments Body Functions Affected:  1. Neuromusculoskeletal  2. Movement Related  3. Skin Related  4. Metobolic/Endocrine Activities and Participation Affected:  1. General Tasks and Demands  2. Mobility  3. Self Care  4.  Community, Social and [place]ine [place]   Number of elements that affect the Plan of Care: 4+: HIGH COMPLEXITY   CLINICAL PRESENTATION:   Presentation: Evolving clinical presentation with changing clinical characteristics: MODERATE COMPLEXITY   CLINICAL DECISION MAKING:   Roxanna Basic Mobility Inpatient Short Form  How much difficulty does the patient currently have. .. Unable A Lot A Little None   1. Turning over in bed (including adjusting bedclothes, sheets and blankets)? [ ] 1   [X] 2   [ ] 3   [ ] 4   2. Sitting down on and standing up from a chair with arms ( e.g., wheelchair, bedside commode, etc.)   [ ] 1   [X] 2   [ ] 3   [ ] 4   3. Moving from lying on back to sitting on the side of the bed? [ ] 1   [X] 2   [ ] 3   [ ] 4   How much help from another person does the patient currently need. .. Total A Lot A Little None   4. Moving to and from a bed to a chair (including a wheelchair)? [X] 1   [ ] 2   [ ] 3   [ ] 4   5. Need to walk in hospital room? [X] 1   [ ] 2   [ ] 3   [ ] 4   6. Climbing 3-5 steps with a railing? [X] 1   [ ] 2   [ ] 3   [ ] 4   © 2007, Trustees of [address] [place] [address], under license to [employer]. All rights reserved    Score:  Initial: 9 Most Recent: X (Date: -- )     Interpretation of Tool:  Represents activities that are increasingly more difficult (i.e. Bed mobility, Transfers, Gait). Score 24 23 22-20 19-15 14-10 9-7 6       Modifier CH CI CJ CK CL CM CN         · Mobility - Walking and Moving Around:               - CURRENT STATUS:    CM - 80%-99% impaired, limited or restricted               - GOAL STATUS:           CL - 60%-79% impaired, limited or restricted               - D/C STATUS:                       ---------------To be determined---------------  [place] / Product Type: Medicare /       Medical Necessity:     · Patient demonstrates fair rehab potential due to higher previous functional level. Reason for Services/Other Comments:  · Patient continues to require skilled intervention due to medical complications, patient unable to attend/participate in therapy as expected and s/p CVA affecting right side.    Use of outcome tool(s) and clinical judgement create a POC that gives a: Questionable prediction of patient's progress: MODERATE COMPLEXITY                 TREATMENT:   (In addition to Assessment/Re-Assessment sessions the following treatments were rendered)   Pre-treatment Symptoms/Complaints:  \"Stand up\"  Pain: Initial:   Pain Intensity 1: 0 (before and after treatment)  Post Session:  0/10     Therapeutic Activity: (   24 Minutes ):  Therapeutic activities including bed mobility training, static/dynamic sitting/standing balance activities, transfer training, posture training, pre-gait activities including weight shifts in standing, and patient education to improve mobility, strength, balance and coordination. Required maximal verbal, tactile, and manual cues   to promote static and dynamic balance in standing, promote coordination of bilateral, lower extremity(s), promote motor control of bilateral, lower extremity(s) and to promote improved technique with bed mobility, transfers, and posture in standing. DATE: 9/26/17 4/27/17       Straight leg raise         Hip abduct/ adduct 2x5 AAL        Heel slides  2x5 AL, WA        Hip external/ internal rotation         Ankle dorsiflexion/ plantarflexion  1x5 AB       LAQ  1x4 AR, 1x2 AAL                           Key:  A=active, AA=active assisted, P=passive, B=bilaterally, R=right, L=left    Braces/Orthotics/Lines/Etc:   · IV, mantilla catheter and PEG tube   · Mitten L UE  Treatment/Session Assessment:    · Response to Treatment: See above  · Interdisciplinary Collaboration:  · Physical Therapy Assistant and Registered Nurse  · After treatment position/precautions:  · Supine in bed, Bed alarm/tab alert on, Bed/Chair-wheels locked, Bed in low position, Call light within reach and RN notified  · Compliance with Program/Exercises: Will assess as treatment progresses. · Recommendations/Intent for next treatment session:   \"Next visit will focus on advancements to more challenging activities, reduction in assistance provided and neuromuscular reeducation. \".   Total Treatment Duration:  PT Patient Time In/Time Out  Time In: 9382  Time Out: [address]18 [address]

## 2017-04-30 PROCEDURE — 74011250637 HC RX REV CODE- 250/637: Performed by: PHYSICIAN ASSISTANT

## 2017-04-30 PROCEDURE — 77030018798 HC PMP KT ENTRL FED COVD -A

## 2017-04-30 PROCEDURE — 74011250637 HC RX REV CODE- 250/637: Performed by: HOSPITALIST

## 2017-04-30 PROCEDURE — 65270000029 HC RM PRIVATE

## 2017-04-30 PROCEDURE — 74011250637 HC RX REV CODE- 250/637: Performed by: INTERNAL MEDICINE

## 2017-04-30 RX ADMIN — FAMOTIDINE 20 MG: 20 TABLET ORAL at 09:08

## 2017-04-30 RX ADMIN — LEVOTHYROXINE SODIUM 50 MCG: 50 TABLET ORAL at 06:14

## 2017-04-30 RX ADMIN — Medication 10 ML: at 06:14

## 2017-04-30 RX ADMIN — METOPROLOL TARTRATE 50 MG: 50 TABLET ORAL at 15:40

## 2017-04-30 RX ADMIN — AMIODARONE HYDROCHLORIDE 400 MG: 200 TABLET ORAL at 09:08

## 2017-04-30 RX ADMIN — LISINOPRIL 40 MG: 20 TABLET ORAL at 09:08

## 2017-04-30 RX ADMIN — METOPROLOL TARTRATE 50 MG: 50 TABLET ORAL at 09:08

## 2017-04-30 RX ADMIN — ASPIRIN 81 MG: 81 TABLET, CHEWABLE ORAL at 09:08

## 2017-04-30 NOTE — PROGRESS NOTES
Hospitalist Progress Note    2017  9:19 AM  Admit Date: 2017  8:54 AM   NAME: Ap Cerna   :  1935   MRN:  [id number]   Attending: Leontine Nissen, MD  PCP:  Pineda Segovia MD  Treatment Team: Attending Provider: Leontine Nissen, MD; Care Manager: Carie Short, RN; Physical Therapist: Marva Rene, PT; Utilization Review: Chery Willoughby; Care Manager: Wang Loyola LMSW  SUBJECTIVE:       Ms. Rolando Flaherty is a 81 yo female admitted with afib with RVR and new left basal ganglia CVA. seen by cardiology, on IV amiodarone and now recommended for oral amiodarone 400 mg BID for 2 weeks, then 200 mg BID for 2 weeks then 200 mg daily. She has converted to NSR. She was on heparin drip that was stopped with nasal bleeding but no evidence of  CVA hemorrhagic conversion. She has been seen by neurology with plans for asa/plavix for 2 weeks then conversion to full anticoagulation with either coumadin or NOAC. She has ECOLI UTI, completed day 7 antibiotics, quinolone sensitive. She required NGT for feeds/meds-GI placed  PEG on , will need SNF. SW assisting       17 daughter at bedside, patient resting with eyes closed, does not respond to voice/ command -no new issues    No results found for this or any previous visit (from the past 24 hour(s)).     No Known Allergies  Current Facility-Administered Medications   Medication Dose Route Frequency Provider Last Rate Last Dose    amLODIPine (NORVASC) tablet 10 mg  10 mg Oral QHS Gema Alvarez MD   10 mg at 17    lisinopril (PRINIVIL, ZESTRIL) tablet 40 mg  40 mg Oral DAILY MIRELLA Miller   40 mg at 17 0908    famotidine (PEPCID) tablet 20 mg  20 mg Oral DAILY Leontine Nissen, MD   20 mg at 17 0908    LORazepam (ATIVAN) injection 1 mg  1 mg IntraVENous Q6H PRN Bill Villareal MD   1 mg at 17 0100    polyvinyl alcohol (LIQUIFILM TEARS) 1.4 % ophthalmic solution 1 Drop  1 Drop Both Eyes PRN Leontine Nissen, MD 1 Drop at 17 1437    metoprolol tartrate (LOPRESSOR) tablet 50 mg  50 mg Oral BID Desiree Hernandez MD   50 mg at 17 8408    aspirin chewable tablet 81 mg  81 mg Oral DAILY Desiree Hernandez MD   81 mg at 17 0908    LORazepam (ATIVAN) injection 1 mg  1 mg IntraVENous ONCE PRN Desiree Hernandez MD        amiodarone (CORDARONE) tablet 400 mg  400 mg Oral BID MIRELLA Rojas   400 mg at 17 5463    hydrALAZINE (APRESOLINE) 20 mg/mL injection 20 mg  20 mg IntraVENous Q4H PRN Abisai Jolly MD   20 mg at 17 0803    metoprolol (LOPRESSOR) injection 5 mg  5 mg IntraVENous Q4H PRN Desiree Hernandez MD   5 mg at 17 1144    levothyroxine (SYNTHROID) tablet 50 mcg  50 mcg Oral ACB Sia Allen MD   50 mcg at 17 9546    pravastatin (PRAVACHOL) tablet 40 mg  40 mg Oral QHS Sia Smoke, MD   40 mg at 17 2157    sodium chloride (NS) flush 5-10 mL  5-10 mL IntraVENous Q8H Sia Allen MD   10 mL at 17 7793    sodium chloride (NS) flush 5-10 mL  5-10 mL IntraVENous PRN Sia Allen MD        acetaminophen (TYLENOL) tablet 650 mg  650 mg Oral Q4H PRN Sia Allen MD   650 mg at 17 1728           Review of Systems as noted above in HPI   PHYSICAL EXAM     Visit Vitals    /59 (BP 1 Location: Right arm, BP Patient Position: At rest)    Pulse 82    Temp 98.2 °F (36.8 °C)    Resp 18    Ht 5' (1.524 m)    Wt 52.2 kg (115 lb)    SpO2 98%    BMI 22.46 kg/m2      Temp (24hrs), Av °F (36.7 °C), Min:97.4 °F (36.3 °C), Max:98.5 °F (36.9 °C)    Oxygen Therapy  O2 Sat (%): 98 % (17 0800)  Pulse via Oximetry: 67 beats per minute (17 0505)  O2 Device: Room air (17 0505)  O2 Flow Rate (L/min): 2 l/min (17 1205)    Intake/Output Summary (Last 24 hours) at 17 0919  Last data filed at 17 1298   Gross per 24 hour   Intake               75 ml   Output              850 ml Net             -775 ml      General: No acute distress,resting  Lungs:  CTAB, good effort anterior exam  Heart:  Regular rate and rhythm, no murmur, no edema   Abdomen: Soft, nontender and nondistended, normal bowel sounds  Extremities: Warm and dry         DIAGNOSTIC STUDIES      Labs and Studies from previous 24 hours have been personally reviewed by myself           Full Code    ASSESSMENT / [address] Problems    Diagnosis Date Noted    Elevated troponin 04/24/2017    Cerebrovascular accident (CVA) due to occlusion of precerebral artery ([facility] 04/24/2017    Hypertensive urgency 04/22/2017    Paroxysmal atrial fibrillation ([facility] 04/22/2017     -appreciate cardio and neuro recommendations   -continue asa/plavix and change to full anticoagulation in 2 weeks -plavix currently on hold for PEG for 5 days postop   -SW for SNF pending    FEN:NGT  DVT Prophylaxis: SCD  Disposition:pending  Time spent with patient:  Care plan addressed: daughter   Risk Assessment:  Signed By: Ruthy Christie MD     April 30, 2017

## 2017-05-01 VITALS
WEIGHT: 115 LBS | HEIGHT: 60 IN | RESPIRATION RATE: 18 BRPM | BODY MASS INDEX: 22.58 KG/M2 | OXYGEN SATURATION: 98 % | HEART RATE: 65 BPM | DIASTOLIC BLOOD PRESSURE: 54 MMHG | SYSTOLIC BLOOD PRESSURE: 144 MMHG | TEMPERATURE: 98 F

## 2017-05-01 PROCEDURE — 97530 THERAPEUTIC ACTIVITIES: CPT

## 2017-05-01 PROCEDURE — 74011250637 HC RX REV CODE- 250/637: Performed by: INTERNAL MEDICINE

## 2017-05-01 PROCEDURE — 77030018798 HC PMP KT ENTRL FED COVD -A

## 2017-05-01 PROCEDURE — 97532 HC OT COGNITIVE SKILL DEV 15 MIN: CPT

## 2017-05-01 PROCEDURE — 74011250637 HC RX REV CODE- 250/637: Performed by: HOSPITALIST

## 2017-05-01 PROCEDURE — 97112 NEUROMUSCULAR REEDUCATION: CPT

## 2017-05-01 PROCEDURE — 74011250637 HC RX REV CODE- 250/637: Performed by: PHYSICIAN ASSISTANT

## 2017-05-01 RX ORDER — AMLODIPINE BESYLATE 10 MG/1
10 TABLET ORAL
Qty: 30 TAB | Refills: 0 | Status: SHIPPED
Start: 2017-05-01

## 2017-05-01 RX ORDER — AMIODARONE HYDROCHLORIDE 400 MG/1
400 TABLET ORAL 2 TIMES DAILY
Qty: 60 TAB | Refills: 0 | Status: SHIPPED
Start: 2017-05-01

## 2017-05-01 RX ORDER — LISINOPRIL 40 MG/1
40 TABLET ORAL DAILY
Qty: 30 TAB | Refills: 0 | Status: SHIPPED
Start: 2017-05-01

## 2017-05-01 RX ORDER — GUAIFENESIN 100 MG/5ML
81 LIQUID (ML) ORAL DAILY
Qty: 30 TAB | Refills: 0 | Status: SHIPPED
Start: 2017-05-01

## 2017-05-01 RX ORDER — FAMOTIDINE 20 MG/1
20 TABLET, FILM COATED ORAL DAILY
Qty: 30 TAB | Refills: 0 | Status: SHIPPED
Start: 2017-05-01

## 2017-05-01 RX ORDER — METOPROLOL TARTRATE 50 MG/1
50 TABLET ORAL 2 TIMES DAILY
Qty: 60 TAB | Refills: 0 | Status: SHIPPED
Start: 2017-05-01

## 2017-05-01 RX ADMIN — ASPIRIN 81 MG: 81 TABLET, CHEWABLE ORAL at 08:04

## 2017-05-01 RX ADMIN — LISINOPRIL 40 MG: 20 TABLET ORAL at 08:03

## 2017-05-01 RX ADMIN — FAMOTIDINE 20 MG: 20 TABLET ORAL at 08:03

## 2017-05-01 RX ADMIN — AMIODARONE HYDROCHLORIDE 400 MG: 200 TABLET ORAL at 08:03

## 2017-05-01 RX ADMIN — Medication 10 ML: at 06:00

## 2017-05-01 RX ADMIN — METOPROLOL TARTRATE 50 MG: 50 TABLET ORAL at 08:03

## 2017-05-01 NOTE — PROGRESS NOTES
Care Management Interventions  Mode of Transport at Discharge: \[facility] [place] [facility]\""  Transition of Care Consult (CM Consult): Discharge Planning  Physical Therapy Consult: Yes  Occupational Therapy Consult: Yes  Speech Therapy Consult: Yes  Current Support Network: Relative's Home (Pt resides with and care is supported by her daughter.)  Confirm Follow Up Transport: Family  Plan discussed with Pt/Family/Caregiver: Yes  Freedom of Choice Offered: Yes  Discharge Location  Discharge Placement: Rehab Unit Subacute ([address] [place] [address])    Pt was accepted for STR admission to [address] [place] [address]. Pt was medically cleared for discharge today and transferred to [facility] for [address] services. Orders were faxed to the facility prior to discharge and originals were forwarded to facility via [facility] transport staff. EUN spoke with pt's dtr before noon to notify her of the bed offer and pt's impending discharge. EUN then called the dtr 2x and left VMM providing pt's transport time.

## 2017-05-01 NOTE — DISCHARGE SUMMARY
Physician Discharge Summary     Patient: Hetal Cosby MRN: [id number]  SSN: [id number]    YOB: 1935  Age: 80 y.o. Sex: female       Admit Date: 4/22/2017    Discharge Date: 5/1/2017    Admission Diagnoses: Other dysphagia [R13.19]    Discharge Diagnoses:   Problem List as of 5/1/2017  Never Reviewed          Codes Class Noted - Resolved    Elevated troponin ICD-10-CM: R74.8  ICD-9-CM: 790.6  4/24/2017 - Present        * (Principal)Cerebrovascular accident (CVA) due to occlusion of precerebral artery ([facility]) ICD-10-CM: I63.20  ICD-9-CM: 433.91  4/24/2017 - Present        Hypertensive urgency ICD-10-CM: I16.0  ICD-9-CM: 401.9  4/22/2017 - Present        Paroxysmal atrial fibrillation (HCC) (Chronic) ICD-10-CM: I48.0  ICD-9-CM: 427.31  4/22/2017 - Present        Hypothyroidism ICD-10-CM: E03.9  ICD-9-CM: 244.9  4/4/2017 - Present        Anxiety ICD-10-CM: F41.9  ICD-9-CM: 300.00  4/4/2017 - Present    Overview Signed 4/4/2017 12:52 PM by Delesanchez Hidden     --SE[facility] DR. Blake Juarez             Dementia ICD-10-CM: F03.90  ICD-9-CM: 294.20  4/4/2017 - Present    Overview Signed 4/4/2017 12:52 PM by Deleta Hidden     --[facility]              Dizziness (Chronic) ICD-10-CM: R42  ICD-9-CM: 780.4  4/4/2017 - Present        Hypertension ICD-10-CM: I10  ICD-9-CM: 401.9  4/4/2017 - Present        Hyperlipidemia ICD-10-CM: E78.5  ICD-9-CM: 272.4  4/4/2017 - Present        PVD (peripheral vascular disease) ([facility]) ICD-10-CM: I73.9  ICD-9-CM: 443.9  4/4/2017 - Present    Overview Signed 4/4/2017 12:54 PM by Deleta Sharee     --CAROTIDS             Osteoarthritis ICD-10-CM: M19.90  ICD-9-CM: 715.90  4/4/2017 - Present        RESOLVED: TIA (transient ischemic attack) ICD-10-CM: G45.9  ICD-9-CM: 435.9  4/22/2017 - 4/24/2017               Discharge Condition: Stable    Hospital Course: Ms. Barbara White is a 81 yo female admitted with afib with RVR and new left basal ganglia CVA.  seen by cardiology, on IV amiodarone and now recommended for oral amiodarone 400 mg BID for 2 weeks, then 200 mg BID for 2 weeks then 200 mg daily. She has converted to NSR. She was on heparin drip that was stopped with nasal bleeding but no evidence of CVA hemorrhagic conversion. She has been seen by neurology with plans for asa/plavix for 2 weeks then conversion to full anticoagulation with either coumadin or NOAC. Of note, plavix has been on a 5 day hold since 4-28-17 PEG placement and will need to be restarted 5-3-17. She has ECOLI UTI, completed day 7 antibiotics, quinolone sensitive. She required NGT for feeds/meds-GI placed PEG on 4-28, will need SNF. SW assisting and for SNF today. Primary Care Physician:  Rosi Johnson, MD     Consults: cardiology, GI, neurology,     Significant Diagnostic Studies:     Head CT     INDICATION: Recent CVA, increasing altered mental status, drowsiness     Multiple axial images obtained through the brain without intravenous contrast.   Radiation dose reduction techniques were used for this study: All CT scans  performed at this facility use one or all of the following: Automated exposure  control, adjustment of the mA and/or kVp according to patient's size, iterative  reconstruction.     FINDINGS: As noted on the recent MRI, there is an acute left basal ganglia  infarct. There is also extensive chronic change in the white matter. There is  no CT evidence of interval hemorrhage. No definite new area of acute infarction  is seen. There is no significant mass effect. The ventricles are normal in  size. There are no extra-axial fluid collections. No masses are seen. The  sinuses are clear. There are no bony lesions.     IMPRESSION  IMPRESSION: Stable left basal ganglia infarct.  No evidence of interval  hemorrhage.       Abdominal film for feeding GI tube placement.     History: GI tube placement.     Technique: Single AP view of the abdomen.     IMPRESSION  IMPRESSION: GI tube tip is projected over the proximal stomach. This could be  advanced 5 to 10 cm. .            Head MRA     History: TIA     Sequences: Contiguous axial 3D time-of-flight images of the head were used to  generate maximum intensity projection images of the Robinson of [place] and  vertebrobasilar systems.     Comparison: None     Findings:     The signal within the petrous cavernous and supraclinoid internal carotid  arteries is somewhat irregular consistent with diffuse atherosclerotic changes. The left A1 segment of the anterior circulation is extremely diminutive in size  however this is likely congenital. The middle cerebral arteries especially the  inferior origins demonstrate narrowing and decreased signal as well as luminal  regularity consistent with diffuse atherosclerotic changes.     There is faint 2 absent signal noted within the distal vertebral arteries and  basilar artery the distal basilar artery and posterior cerebral arteries do  reconstitute signal however the signal is extremely faint secondary to very slow  flow.     No definite evidence of aneurysm and/or vascular malformation.     IMPRESSION  IMPRESSION:  1. Diffuse atherosclerotic changes most notable within the posterior circulation  in which the distal vertebral arteries and basilar artery as well as the  posterior cerebral arteries demonstrate either very faint to absent signal. This  would indicate either partial occlusive disease versus extremely slow flow due  to atherosclerotic changes           MRI of the brain without contrast.     Clinical indications: Right-sided weakness, lethargy, drowsiness     PROCEDURE: Multiplanar and multisequence MR imaging performed through the brain  without the administration of intravenous gadolinium contrast.     COMPARISON: MRI of the brain dated 2/11/2013, CT of the head from the previous  day     FINDINGS: A rounded area of abnormal restricted diffusion is appreciated  throughout the left basal ganglia region.  This is in keeping with acute CVA. There is moderate to severe, confluent bilateral chronic white matter  microangiopathic disease. No abnormal extra-axial fluid collection evident. There is no hydrocephalus. Mild hemispheric atrophy is noted. There is no acute  hemorrhage. The included paranasal sinuses and mastoid air cells are clear. The  marrow signal in the calvaria and skull base is normal. The sella is  unremarkable. The large intracranial blastic flow voids and dural venous sinus  flow voids are maintained.     IMPRESSION  IMPRESSION:  1. Acute moderate sized left basal ganglia CVA. 2. No acute hemorrhage. 2. Moderate to severe chronic white matter microangiopathic disease.              Neck MRA     History: TIA     Sequences: Contiguous axial 3D time-of-flight images of the neck and upper chest  were used to generate maximum intensity projection images of the cervicocerebral  arch and neck vasculature.     Comparison: None.     Findings:  Within the arch demonstrates standard great vessel branching. The proximal great  vessels demonstrate normal signal.     Irregular signal is noted at the right carotid bulb with significant narrowing  of the origin of the external carotid artery. There appears to be less than 50%  narrowing of the origin of the right internal carotid artery.     Irregular and decreased signal is noted at the left carotid bulb and at the  origin of the external and internal carotid arteries suggestive of a significant  stenosis of the origin of the external carotid artery. Degree of stenosis of the  origin of the internal carotid artery appears to be approximately 60-65%.     Signal within the posterior circulation is extremely irregular and the distal  vertebral arteries and basilar artery demonstrate very faint to absent signal.  Please see MRA of the head for further evaluation.     IMPRESSION  IMPRESSION:  1. 50% narrowing of the origin of the right internal carotid artery.   2. 60-65% narrowing at the origin of the left internal carotid artery  3. Bilateral significant narrowing at the origins of the external carotid  arteries. 4. Significant posterior circulation disease.             [place]  [address], [address] [facility]  [phone]    Transthoracic Echocardiogram  2D, M-mode, Doppler, and Color Doppler    Patient: SUNG LAU  MR #: [id number]  : 1935  Age: 80 years  Gender: Female  Study date: 2017  Account #: [de-identified]  Height: 60 in  Weight: 99.9 lb  BSA: 1.39 mï¾²  Status:Routine  Location: 3104  BP: 161/ 68    Allergies: NO KNOWN ALLERGIES    Sonographer: BLESSING Velarde  Group:  [facility]  Referring Physician: Connie Liriano. Cristian Braga DO  Reading Physician: My Pradhan. Cosme Krueger MD [facility]    INDICATIONS: Ischemic Stroke    PROCEDURE: This was a routine study. A transthoracic echocardiogram was  performed. The study included complete 2D imaging, M-mode, complete spectral  Doppler, and color Doppler. Intravenous contrast (agitated saline) was  administered in the right arm. Image quality was adequate. LEFT VENTRICLE: Size was normal. Systolic function was hyperdynamic. Ejection  fraction was estimated in the range of 70 % to 75 %. There were no regional  wall motion abnormalities. Wall thickness was normal. The study was not  technically sufficient to allow evaluation of LV diastolic function. RIGHT VENTRICLE: The size was normal. Systolic function was normal. The  tricuspid jet envelope definition was inadequate for estimation of RV   systolic  pressure. LEFT ATRIUM: The atrium was moderately dilated. ATRIAL SEPTUM: Contrast injection was performed. There was no right-to-left  shunt, with provocative maneuvers to increase right atrial pressure. RIGHT ATRIUM: The atrium was mildly dilated. SYSTEMIC VEINS: IVC: The inferior vena cava was normal in size and course. AORTIC VALVE: The valve was trileaflet.  Leaflets exhibited mild sclerosis. There was no evidence for stenosis. There was no insufficiency. MITRAL VALVE: Valve structure was normal. There was no evidence for stenosis. There was trace regurgitation. TRICUSPID VALVE: The valve structure was normal. There was no evidence for  stenosis. There was trivial regurgitation. PULMONIC VALVE: Not well visualized. There was no evidence for stenosis. There  was no insufficiency. PERICARDIUM: There was no pericardial effusion. AORTA: The root exhibited normal size. SUMMARY:    -  Left ventricle: Systolic function was hyperdynamic. Ejection fraction was  estimated in the range of 70 % to 75 %. There were no regional wall motion  abnormalities. -  Left atrium: The atrium was moderately dilated. -  Atrial septum: Contrast injection was performed. There was no   right-to-left  shunt, with provocative maneuvers to increase right atrial pressure. -  Right atrium: The atrium was mildly dilated. SYSTEM MEASUREMENT TABLES    2D mode  AoR Diam (2D): 2.4 cm  LA Dimension (2D): 3.4 cm  Left Atrium Systolic Volume Index; Method of Disks, Biplane; 2D mode;: 37.4  ml/m2  IVS/LVPW (2D): 1  IVSd (2D): 1.1 cm  LVIDd (2D): 3.9 cm  LVIDs (2D): 2.3 cm  LVOT Area (2D): 2.3 cm2  LVPWd (2D): 1.1 cm    Unspecified Scan Mode  Peak Grad; Mean; Antegrade Flow: 8 mm[Hg]  Vmax; Antegrade Flow: 134 cm/s  LVOT Diam: 1.7 cm    Prepared and signed by    Linda Albert MD [facility]  Signed 22-Apr-2017 17:26:59                Discharge Exam:  Visit Vitals    /54 (BP 1 Location: Left arm, BP Patient Position: Sitting)  Comment (BP Patient Position): up in the chair    Pulse 65    Temp 98 °F (36.7 °C)    Resp 18    Ht 5' (1.524 m)    Wt 52.2 kg (115 lb)    SpO2 98%    BMI 22.46 kg/m2   General: No acute distress,resting  Lungs:  CTAB, good effort anterior exam  Heart:  Regular rate and rhythm, no murmur, no edema   Abdomen: Soft, nontender and nondistended, normal bowel sounds  Extremities: Warm and dry        Disposition: [place] (SNF)    Discharge Medications:   Current Discharge Medication List      START taking these medications    Details   amiodarone (PACERONE) 400 mg tablet Take 1 Tab by mouth two (2) times a day. Qty: 60 Tab, Refills: 0      amLODIPine (NORVASC) 10 mg tablet Take 1 Tab by mouth nightly. Qty: 30 Tab, Refills: 0      aspirin 81 mg chewable tablet Take 1 Tab by mouth daily. Qty: 30 Tab, Refills: 0      famotidine (PEPCID) 20 mg tablet Take 1 Tab by mouth daily. Qty: 30 Tab, Refills: 0      lisinopril (PRINIVIL, ZESTRIL) 40 mg tablet Take 1 Tab by mouth daily. Qty: 30 Tab, Refills: 0         CONTINUE these medications which have CHANGED    Details   metoprolol tartrate (LOPRESSOR) 50 mg tablet Take 1 Tab by mouth two (2) times a day. Qty: 60 Tab, Refills: 0         CONTINUE these medications which have NOT CHANGED    Details   levothyroxine (SYNTHROID) 50 mcg tablet Take  by mouth Daily (before breakfast). pravastatin (PRAVACHOL) 40 mg tablet Take 40 mg by mouth nightly. Activity: PT/OT Eval and Treat    Diet: PEG feeding     Wound Care: None needed    Follow-up Appointments   Procedures    FOLLOW UP VISIT Appointment in: Other (Specify) Cardio 2-4 weeks     Cardio 2-4 weeks     Standing Status:   Standing     Number of Occurrences:   1     Order Specific Question:   Appointment in     Answer:    Other (Specify)        Time to discharge:30 minutes     Signed By: Andrew Treviño MD     May 1, 2017

## 2017-05-01 NOTE — PROGRESS NOTES
Problem: Self Care Deficits Care Plan (Adult)  Goal: *Acute Goals and Plan of Care (Insert Text)  1. Patient will complete upper body bathing and grooming with minimal assist and adaptive equipment as needed. 2. Patient will complete toileting with moderate assist.  3. Patient will tolerate 20 minutes of OT treatment with 1-2 rest breaks to increase activity tolerance for ADLs. 4. Patient will complete functional bed mobility with moderate assist and adaptive equipment as needed. 5. Patient will demonstrate sitting edge of bed with CGA to increase ADLS and precursor for transfers. Timeframe: 7 visits       OCCUPATIONAL THERAPY: Daily Note, Treatment Day: 2nd and AM    5/1/2017  INPATIENT: Hospital Day: 10  [place] / Product Type: Medicare /      NAME/AGE/GENDER: Ap Talley is a 80 y.o. female         PRIMARY DIAGNOSIS:  Other dysphagia [R13.19] Cerebrovascular accident (CVA) due to occlusion of precerebral artery ([address] Cerebrovascular accident (CVA) due to occlusion of precerebral artery (HCC)  Procedure(s) (LRB):  ESOPHAGOGASTRODUODENOSCOPY (EGD)/ BMI= 23 / ROOM 719 (N/A)  PERCUTANEOUS ENDOSCOPIC GASTROSTOMY TUBE INSERTION (N/A)  3 Days Post-Op  ICD-10: Treatment Diagnosis:        · Generalized Muscle Weakness (M62.81)  · Other lack of cordination (R27.8)   Precautions/Allergies:        FALLS Review of patient's allergies indicates no known allergies. ASSESSMENT:      Ms. Gayla Talley presents with right sided weakness with flaccid right UE, right inattention, and visual deficits on right eye. Pt s/p MRI and found to have an acute left sided basal ganglia stroke. Pt with decreased mobility and ADLS and will likely require extensive therapy to address deficits. 5/1/2017 Pt was presented in supine upon arrival. Pt transferred to sitting with moderate assistance.  Pt sat edge of bed with CGA/min a and participated in NDT and cognitive activities to increase trunk strength and balance and pt's ability to follow commands and to stay on task. OMAR assisted PT with transferred the pt to standing and to the chair; refer to PT note for details. Pt participated with good effort and will continue to benefit from OT to increase progression toward above goals. This section established at most recent assessment   PROBLEM LIST (Impairments causing functional limitations):  1. Decreased Strength  2. Decreased ADL/Functional Activities  3. Decreased Transfer Abilities  4. Decreased Ambulation Ability/Technique  5. Decreased Balance  6. Decreased Activity Tolerance  7. Decreased Pacing Skills  8. Increased Fatigue  9. Decreased Flexibility/Joint Mobility  10. Decreased Cognition    INTERVENTIONS PLANNED: (Benefits and precautions of occupational therapy have been discussed with the patient.)  1. Activities of daily living training  2. Adaptive equipment training  3. Balance training  4. Clothing management  5. Cognitive training  6. Donning&doffing training  7. Hygiene training  8. Neuromuscular re-eduation  9. Theraputic activity  10. Theraputic exercise      TREATMENT PLAN: Frequency/Duration: Follow patient 3 x/ week to address above goals. Rehabilitation Potential For Stated Goals: FAIR      RECOMMENDED REHABILITATION/EQUIPMENT: (at time of discharge pending progress): Continue Skilled Therapy. OCCUPATIONAL PROFILE AND HISTORY:   History of Present Injury/Illness (Reason for Referral):  See H & P  Past Medical History/Comorbidities:   Ms. Kitty Arora  has a past medical history of Anxiety (4/4/2017); Dementia (4/4/2017); Dizziness (4/4/2017); Hyperlipidemia (4/4/2017); Hypertension (4/4/2017); Hypothyroidism (4/4/2017); Osteoarthritis (4/4/2017); and PVD (peripheral vascular disease) ([facility] [address] (4/4/2017). Ms. Kitty Arora  has a past surgical history that includes hysterectomy and endoscopy (2017).   Social History/Living Environment:   Home Environment: Private residence  One/Two Story Residence: Two story  Living Alone: No  Support Systems: Child(keysha), Family member(s)  Patient Expects to be Discharged to[de-identified] Rehabilitation facility  Current DME Used/Available at Home: None  Prior Level of Function/Work/Activity:  No family present to assist with prior level of function history      Number of Personal Factors/Comorbidities that affect the Plan of Care: Expanded review of therapy/medical records (1-2):  MODERATE COMPLEXITY   ASSESSMENT OF OCCUPATIONAL PERFORMANCE[de-identified]   Activities of Daily Living:           Basic ADLs (From Assessment) Complex ADLs (From Assessment)   Basic ADL  Feeding: Maximum assistance, Additional time  Oral Facial Hygiene/Grooming: Maximum assistance, Additional time  Bathing: Maximum assistance, Additional time, Adaptive equipment  Upper Body Dressing: Maximum assistance, Additional time  Lower Body Dressing: Maximum assistance, Additional time  Toileting: Adaptive equipment, Maximum assistance, Additional time     Grooming/Bathing/Dressing Activities of Daily Living                                        Most Recent Physical Functioning:   Gross Assessment:                  Posture:  Posture (WDL): Exceptions to WDL  Posture Assessment: Cervical, Forward head  Balance:    Bed Mobility:     Wheelchair Mobility:     Transfers:                    Patient Vitals for the past 6 hrs:   BP BP Patient Position SpO2 Pulse   05/01/17 0744 122/43 At rest 99 % 69   05/01/17 1129 144/54 Sitting 98 % 65        Mental Status  Neurologic State: Drowsy  Orientation Level: Unable to verbalize  Cognition: Unable to assess (comment)  Perception: Cues to attend left visual field  Perseveration: No perseveration noted  Safety/Judgement: Decreased awareness of need for safety, Decreased insight into deficits                               Physical Skills Involved:  1. Range of Motion  2. Balance  3. Mobility  4. Strength  5. Endurance  6.  Fine or Gross Motor Coordination  7. Sensation Cognitive Skills Affected (resulting in the inability to perform in a timely and safe manner):  1. Attending  2. Understanding  3. Problem Solving  4. Learning  5. Remembering Psychosocial Skills Affected:  1. Routines and Behaviors  2. Active Use of Coping Strategies  3. Environmental Adaptations   Number of elements that affect the Plan of Care: 3-5:  MODERATE COMPLEXITY   CLINICAL DECISION MAKIN[address] [place] [address] AM-PAC 6 Clicks   Basic Mobility Inpatient Short Form  How much help from another person does the patient currently need. .. Total A Lot A Little None   1. Putting on and taking off regular lower body clothing?   [ ] 1   [X] 2   [ ] 3   [ ] 4   2. Bathing (including washing, rinsing, drying)? [ ] 1   [X] 2   [ ] 3   [ ] 4   3. Toileting, which includes using toilet, bedpan or urinal?   [ ] 1   [X] 2   [ ] 3   [ ] 4   4. Putting on and taking off regular upper body clothing?   [ ] 1   [X] 2   [ ] 3   [ ] 4   5. Taking care of personal grooming such as brushing teeth? [ ] 1   [X] 2   [ ] 3   [ ] 4   6. Eating meals? [ ] 1   [X] 2   [ ] 3   [ ] 4   © , Trustees of [address] [place], [place] [id number], under license to [employer]. All rights reserved    Score:  Initial: 12, completed 17 Most Recent: X (Date: -- )     Interpretation of Tool:  Represents activities that are increasingly more difficult (i.e. Bed mobility, Transfers, Gait).        Score 24 23 22-20 19-15 14-10 9-7 6       Modifier CH CI CJ CK CL CM CN         · Self Care:               - CURRENT STATUS:    CL - 60%-79% impaired, limited or restricted               - GOAL STATUS:           CK - 40%-59% impaired, limited or restricted               - D/C STATUS:                       ---------------To be determined---------------  [place] / Product Type: Medicare /       Medical Necessity:     · Patient is expected to demonstrate progress in strength, range of motion, balance, coordination and functional technique to decrease assistance required with mobility and increase independence with ADLS. Reason for Services/Other Comments:  · Patient continues to require skilled intervention due to pt in ICU; right UE flaccid, right inattention, maximum assist.   Use of outcome tool(s) and clinical judgement create a POC that gives a: MODERATE COMPLEXITY             TREATMENT:   (In addition to Assessment/Re-Assessment sessions the following treatments were rendered)      Pre-treatment Symptoms/Complaints:    Pain: Initial:   Pain Intensity 1: 0 does not indicate pain Post Session:  Does not indicate pain      Cognitive Skills Development: (10 minutes): Procedure(s) (per grid) utilized to improve and/or restore cognitive functioning as related to ability to follow simple commands, attention to tasks, problem solving skills, memory, safety awareness, compensatory strategies and self awareness. Required moderate visual, verbal, manual and   cueing to facilitate perform graded activities focusing on attention skills. Neuromuscular Re-education: (15 minutes):  Exercise/activities per grid below to improve balance, kinesthetic sense, posture and proprioception. Required moderate visual, verbal and tactile cues to promote motor control of trunk. Re-Education Training  Re-Education Training: Maritza Hernandez Re-Education  Other Activities: Pt participated in lateral weight bearing through her RUE. Pt completed shrugs with trace trapezius elevation.                Braces/Orthotics/Lines/Etc:   · IV  · mantilla catheter  · O2 Device: Room air  Treatment/Session Assessment:    · Response to Treatment:  Cooperative but quiet, decreased verbalizations  · Interdisciplinary Collaboration:  · Physical Therapist, Certified Occupational Therapy Assistant and Registered Nurse  · After treatment position/precautions:  · Up in chair, Bed alarm/tab alert on, Bed/Chair-wheels locked, Call light within reach and RN notified  · Compliance with Program/Exercises: Will assess as treatment progresses. · Recommendations/Intent for next treatment session: \"Next visit will focus on advancements to more challenging activities and reduction in assistance provided\".   Total Treatment Duration:   OT Patient Time In/Time Out  Time In: 0915  Time Out: [address]

## 2017-05-01 NOTE — PROGRESS NOTES
Problem: Mobility Impaired (Adult and Pediatric)  Goal: *Therapy Goal (Edit Goal, Insert Text)  Right sided hemiplegia  STG:  (1.)Ms. Negar Baez will move from supine to sit and sit to supine , scoot up and down and roll side to side with MODERATE ASSIST within 7 day(s). (2.)Ms. Negar Baez will transfer from bed to chair and chair to bed with MODERATE ASSIST using the least restrictive device within 7 day(s). (3.)Ms. Negar Baez will ambulate with MAXIMAL ASSIST for 5 feet with the least restrictive device within 7 day(s). LTG:  (1.)Ms. Negar Baez will move from supine to sit and sit to supine , scoot up and down and roll side to side in bed with MINIMAL ASSIST within 14 day(s). (2.)Ms. Negar Baez will transfer from bed to chair and chair to bed with MINIMAL ASSIST using the least restrictive device within 14 day(s). (3.)Ms. Negar Baez will ambulate with MODERATE ASSIST for 25 feet with the least restrictive device within 14 day(s). _______________________________________________________________________________________________  PHYSICAL THERAPY: Daily Note, Treatment Day: 5th and AM    5/1/2017  INPATIENT: Hospital Day: 10  [place] / Product Type: Medicare /      NAME/AGE/GENDER: Ap Baez is a 80 y.o. female         PRIMARY DIAGNOSIS: Other dysphagia [R13.19] Cerebrovascular accident (CVA) due to occlusion of precerebral artery ([address] Cerebrovascular accident (CVA) due to occlusion of precerebral artery ([address]  Procedure(s) (LRB):  ESOPHAGOGASTRODUODENOSCOPY (EGD)/ BMI= 23 / ROOM 719 (N/A)  PERCUTANEOUS ENDOSCOPIC GASTROSTOMY TUBE INSERTION (N/A)  3 Days Post-Op  ICD-10: Treatment Diagnosis:       · Paralytic gait (R26.1)  · Hemiplegia and hemiparesis following cerebral infarction affecting right dominant side (N52.605)   Precaution/Allergies:  Review of patient's allergies indicates no known allergies.        ASSESSMENT:      Ms. 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 Patient seen this AM for physical therapy treatment session. Participated in therapeutic activity including bed mobility with moderate-maximal assistance, sit to stand/stand to sit  transfer training with maximal assistance x2, and bed to chair transfer with maximal assistance x2 with blocking/advancement of R LE. R LE quad 3-/5 appreciated today. Continues to be limited in proximal extension with static standing. Good progress this AM with patient tolerating a longer treatment session. Continue to recommend skilled PT service to optimize functional mobility. This section established at most recent assessment   PROBLEM LIST (Impairments causing functional limitations):  1. Decreased Strength  2. Decreased ADL/Functional Activities  3. Decreased Transfer Abilities  4. Decreased Ambulation Ability/Technique  5. Decreased Balance  6. Decreased Activity Tolerance  7. Decreased Pacing Skills  8. Decreased Flexibility/Joint Mobility  9. Decreased [place] with Home Exercise Program    INTERVENTIONS PLANNED: (Benefits and precautions of physical therapy have been discussed with the patient.)  1. Balance Exercise  2. Bed Mobility  3. Family Education  4. Gait Training  5. Home Exercise Program (HEP)  6. Range of Motion (ROM)  7. Therapeutic Activites  8. Therapeutic Exercise/Strengthening  9. Transfer Training  10. Neuromuscular reeducation      TREATMENT PLAN: Frequency/Duration: 3 times a week for duration of hospital stay  Rehabilitation Potential For Stated Goals: GOOD      RECOMMENDED REHABILITATION/EQUIPMENT: (at time of discharge pending progress): Continue Skilled Therapy and and to be determined at medical discharge.                      HISTORY:   History of Present Injury/Illness (Reason for Referral):  PER MD H&P  Patient is 79 y/o  female with pmhx of dyslipidemia, HTN, hypothyroidism, osteoarthritis, PVD who presented to ER in view of right sided weakness, lethargy and drowsiness. Patient was evaluated in ER in presence of patient's daughter. According to the daughter (primary history provider as patient is drowsy), her mother was in usual health until last night. This morning when she went to check on her mother, she looked very weak and complained of right sided weakness. She also wasn't answering questions appropriately. At baseline, she is pretty much active, independent with ADL. ROS could not be obtained, but patient denied having any chest pain or shortness of breath. In the ER, she also had new onset A. Fib with RVR, for which she was started on cardiazem drip after receiving a bolus. Her blood pressure was elevated SBP > 210, DBP > 100, which came down after one dose of IV hydralazine. During A.fib, she started frothing from the mouth, but no seizure like activity was noticed. Past Medical History/Comorbidities:   Ms. Herbert Spence  has a past medical history of Anxiety (4/4/2017); Dementia (4/4/2017); Dizziness (4/4/2017); Hyperlipidemia (4/4/2017); Hypertension (4/4/2017); Hypothyroidism (4/4/2017); Osteoarthritis (4/4/2017); and PVD (peripheral vascular disease) ([facility] [address] (4/4/2017). Ms. Herbert Spence  has a past surgical history that includes hysterectomy and endoscopy (2017). Social History/Living Environment:   Home Environment: Private residence  One/Two Story Residence: Two story  Living Alone: No  Support Systems: Child(keysha), Family member(s)  Patient Expects to be Discharged to[de-identified] Rehabilitation facility  Current DME Used/Available at Home: None Per patient daughter, patient lives with her and walk around the house only. Prior Level of Function/Work/Activity:  Limited to in house ambulation and mobility only per patient   Dominant Side:         RIGHT  Personal Factors:          Sex:  female        Age:  80 y.o.    Number of Personal Factors/Comorbidities that affect the Plan of Care: 1-2: MODERATE COMPLEXITY EXAMINATION:   Most Recent Physical Functioning:   Gross Assessment:                  Posture:     Balance:  Sitting: Impaired  Sitting - Static: Fair (occasional)  Sitting - Dynamic: Fair (occasional)  Standing: Impaired  Standing - Static: Poor  Standing - Dynamic : Poor Bed Mobility:  Supine to Sit: Moderate assistance;Assist x2  Scooting: Maximum assistance;Assist x2  Wheelchair Mobility:     Transfers:  Sit to Stand: Maximum assistance;Assist x2  Stand to Sit: Maximum assistance;Assist x2  Bed to Chair: Maximum assistance;Assist x2  Interventions: Safety awareness training; Tactile cues; Verbal cues  Gait:             Body Structures Involved:  1. Metabolic  2. Bones  3. Joints  4. Muscles  5. Ligaments Body Functions Affected:  1. Neuromusculoskeletal  2. Movement Related  3. Skin Related  4. Metobolic/Endocrine Activities and Participation Affected:  1. General Tasks and Demands  2. Mobility  3. Self Care  4. Community, Social and [place]   Number of elements that affect the Plan of Care: 4+: HIGH COMPLEXITY   CLINICAL PRESENTATION:   Presentation: Evolving clinical presentation with changing clinical characteristics: MODERATE COMPLEXITY   CLINICAL DECISION MAKIN [facility] Inpatient Short Form  How much difficulty does the patient currently have. .. Unable A Lot A Little None   1. Turning over in bed (including adjusting bedclothes, sheets and blankets)? [ ] 1   [X] 2   [ ] 3   [ ] 4   2. Sitting down on and standing up from a chair with arms ( e.g., wheelchair, bedside commode, etc.)   [ ] 1   [X] 2   [ ] 3   [ ] 4   3. Moving from lying on back to sitting on the side of the bed? [ ] 1   [X] 2   [ ] 3   [ ] 4   How much help from another person does the patient currently need. .. Total A Lot A Little None   4. Moving to and from a bed to a chair (including a wheelchair)? [X] 1   [ ] 2   [ ] 3   [ ] 4   5. Need to walk in hospital room?    [X] 1   [ ] 2   [ ] 3   [ ] 4   6. Climbing 3-5 steps with a railing? [X] 1   [ ] 2   [ ] 3   [ ] 4   © 2007, Trustees of [address] [place] [address] [address] [id number], under license to [employer]. All rights reserved    Score:  Initial: 9 Most Recent: X (Date: -- )     Interpretation of Tool:  Represents activities that are increasingly more difficult (i.e. Bed mobility, Transfers, Gait). Score 24 23 22-20 19-15 14-10 9-7 6       Modifier CH CI CJ CK CL CM CN         · Mobility - Walking and Moving Around:               - CURRENT STATUS:    CM - 80%-99% impaired, limited or restricted               - GOAL STATUS:           CL - 60%-79% impaired, limited or restricted               - D/C STATUS:                       ---------------To be determined---------------  [place] / Product Type: Medicare /       Medical Necessity:     · Patient demonstrates fair rehab potential due to higher previous functional level. Reason for Services/Other Comments:  · Patient continues to require skilled intervention due to medical complications, patient unable to attend/participate in therapy as expected and s/p CVA affecting right side. Use of outcome tool(s) and clinical judgement create a POC that gives a: Questionable prediction of patient's progress: MODERATE COMPLEXITY                 TREATMENT:   (In addition to Assessment/Re-Assessment sessions the following treatments were rendered)   Pre-treatment Symptoms/Complaints:  \"Stand up\"  Pain: Initial:   Pain Intensity 1: 4  Post Session:  0/10     Therapeutic Activity: (   25 Minutes ):  Therapeutic activities including bed mobility, sit to stand and stand to sit transfer training, bed to chair transfer training, positioning to provide pressure relief, and patient education to improve mobility, strength and coordination.   Required maximal verbal, tactile, and manual cues   to promote coordination of right, lower extremity(s) and promote motor control of right, lower extremity(s). Braces/Orthotics/Lines/Etc:   · IV, mantilla catheter and PEG tube (held with mobility; restarted s/p mobility)   Treatment/Session Assessment:    · Response to Treatment: See above  · Interdisciplinary Collaboration:  · Physical Therapy Assistant and Registered Nurse  · After treatment position/precautions:  · Up in chair, Bed alarm/tab alert on, Bed/Chair-wheels locked, Bed in low position, Call light within reach, RN notified and positioned to provide pressure relief  · Compliance with Program/Exercises: Will assess as treatment progresses. · Recommendations/Intent for next treatment session: \"Next visit will focus on advancements to more challenging activities, reduction in assistance provided and neuromuscular reeducation. \".     Total Treatment Duration:  PT Patient Time In/Time Out  Time In: 0915  Time Out: 0940     Yelitza Lim DPT

## 2017-05-12 ENCOUNTER — HOSPITAL ENCOUNTER (OUTPATIENT)
Dept: LAB | Age: 82
Discharge: HOME | End: 2017-05-12

## 2017-05-12 LAB
BACTERIA SPEC CULT: ABNORMAL
BACTERIA SPEC CULT: ABNORMAL
BACTERIA SPEC CULT: POSITIVE
ERYTHROCYTE [DISTWIDTH] IN BLOOD BY AUTOMATED COUNT: 13.6 % (ref 11.9–14.6)
HCT VFR BLD AUTO: 43.5 % (ref 35.8–46.3)
HGB BLD-MCNC: 14 G/DL (ref 11.7–15.4)
MCH RBC QN AUTO: 29.2 PG (ref 26.1–32.9)
MCHC RBC AUTO-ENTMCNC: 32.2 G/DL (ref 31.4–35)
MCV RBC AUTO: 90.6 FL (ref 79.6–97.8)
PLATELET # BLD AUTO: 257 K/UL (ref 150–450)
PMV BLD AUTO: 14.2 FL (ref 10.8–14.1)
RBC # BLD AUTO: 4.8 M/UL (ref 4.05–5.25)
SERVICE CMNT-IMP: ABNORMAL
WBC # BLD AUTO: 8.8 K/UL (ref 4.3–11.1)

## 2017-05-12 PROCEDURE — 85027 COMPLETE CBC AUTOMATED: CPT | Performed by: GENERAL PRACTICE

## 2017-05-12 PROCEDURE — 87493 C DIFF AMPLIFIED PROBE: CPT | Performed by: GENERAL PRACTICE

## 2017-10-23 ENCOUNTER — HOSPITAL ENCOUNTER (OUTPATIENT)
Dept: LAB | Age: 82
Discharge: HOME | End: 2017-10-23

## 2017-10-23 LAB
ANION GAP SERPL CALC-SCNC: 7 MMOL/L (ref 7–16)
BUN SERPL-MCNC: 15 MG/DL (ref 8–23)
CALCIUM SERPL-MCNC: 9 MG/DL (ref 8.3–10.4)
CHLORIDE SERPL-SCNC: 109 MMOL/L (ref 98–107)
CO2 SERPL-SCNC: 30 MMOL/L (ref 21–32)
CREAT SERPL-MCNC: 0.98 MG/DL (ref 0.6–1)
GLUCOSE SERPL-MCNC: 127 MG/DL (ref 65–100)
POTASSIUM SERPL-SCNC: 3.5 MMOL/L (ref 3.5–5.1)
SODIUM SERPL-SCNC: 146 MMOL/L (ref 136–145)

## 2017-10-23 PROCEDURE — 80048 BASIC METABOLIC PNL TOTAL CA: CPT | Performed by: FAMILY MEDICINE

## 2018-03-01 ENCOUNTER — APPOINTMENT (OUTPATIENT)
Dept: GENERAL RADIOLOGY | Age: 83
DRG: 482 | End: 2018-03-01
Attending: EMERGENCY MEDICINE
Payer: MEDICARE

## 2018-03-01 ENCOUNTER — ANESTHESIA EVENT (OUTPATIENT)
Dept: SURGERY | Age: 83
DRG: 482 | End: 2018-03-01
Payer: MEDICARE

## 2018-03-01 ENCOUNTER — HOSPITAL ENCOUNTER (INPATIENT)
Age: 83
LOS: 5 days | Discharge: NURSING, REHAB OR LONG-TERM CARE | DRG: 482 | End: 2018-03-06
Attending: EMERGENCY MEDICINE | Admitting: INTERNAL MEDICINE
Payer: MEDICARE

## 2018-03-01 DIAGNOSIS — S72.001A CLOSED FRACTURE OF RIGHT HIP, INITIAL ENCOUNTER (HCC): Primary | ICD-10-CM

## 2018-03-01 PROBLEM — S72.009A HIP FRACTURE (HCC): Status: ACTIVE | Noted: 2018-03-01

## 2018-03-01 PROBLEM — Z66 DNR (DO NOT RESUSCITATE): Chronic | Status: ACTIVE | Noted: 2018-03-01

## 2018-03-01 PROBLEM — I10 ESSENTIAL HYPERTENSION: Chronic | Status: ACTIVE | Noted: 2018-03-01

## 2018-03-01 PROBLEM — F03.90 DEMENTIA WITHOUT BEHAVIORAL DISTURBANCE (HCC): Chronic | Status: ACTIVE | Noted: 2018-03-01

## 2018-03-01 LAB
ABO + RH BLD: NORMAL
ALBUMIN SERPL-MCNC: 2.9 G/DL (ref 3.2–4.6)
ALBUMIN/GLOB SERPL: 0.6 {RATIO} (ref 1.2–3.5)
ALP SERPL-CCNC: 97 U/L (ref 50–136)
ALT SERPL-CCNC: 46 U/L (ref 12–65)
AMORPH CRY URNS QL MICRO: ABNORMAL
ANION GAP SERPL CALC-SCNC: 6 MMOL/L (ref 7–16)
APPEARANCE UR: ABNORMAL
AST SERPL-CCNC: 59 U/L (ref 15–37)
BACTERIA SPEC CULT: NORMAL
BACTERIA URNS QL MICRO: 0 /HPF
BASOPHILS # BLD: 0 K/UL (ref 0–0.2)
BASOPHILS NFR BLD: 0 % (ref 0–2)
BILIRUB SERPL-MCNC: 0.6 MG/DL (ref 0.2–1.1)
BILIRUB UR QL: NEGATIVE
BLOOD GROUP ANTIBODIES SERPL: NORMAL
BUN SERPL-MCNC: 15 MG/DL (ref 8–23)
CALCIUM SERPL-MCNC: 8.7 MG/DL (ref 8.3–10.4)
CALCIUM SERPL-MCNC: 8.8 MG/DL (ref 8.3–10.4)
CHLORIDE SERPL-SCNC: 108 MMOL/L (ref 98–107)
CO2 SERPL-SCNC: 32 MMOL/L (ref 21–32)
COLOR UR: YELLOW
CREAT SERPL-MCNC: 0.87 MG/DL (ref 0.6–1)
DIFFERENTIAL METHOD BLD: NORMAL
EOSINOPHIL # BLD: 0.1 K/UL (ref 0–0.8)
EOSINOPHIL NFR BLD: 1 % (ref 0.5–7.8)
EPI CELLS #/AREA URNS HPF: ABNORMAL /HPF
ERYTHROCYTE [DISTWIDTH] IN BLOOD BY AUTOMATED COUNT: 13.9 % (ref 11.9–14.6)
GLOBULIN SER CALC-MCNC: 4.5 G/DL (ref 2.3–3.5)
GLUCOSE SERPL-MCNC: 88 MG/DL (ref 65–100)
GLUCOSE UR STRIP.AUTO-MCNC: NEGATIVE MG/DL
HCT VFR BLD AUTO: 41.9 % (ref 35.8–46.3)
HGB BLD-MCNC: 13.6 G/DL (ref 11.7–15.4)
HGB UR QL STRIP: ABNORMAL
IMM GRANULOCYTES # BLD: 0 K/UL (ref 0–0.5)
IMM GRANULOCYTES NFR BLD AUTO: 0 % (ref 0–5)
INR PPP: 1.3
KETONES UR QL STRIP.AUTO: NEGATIVE MG/DL
LEUKOCYTE ESTERASE UR QL STRIP.AUTO: NEGATIVE
LYMPHOCYTES # BLD: 1.3 K/UL (ref 0.5–4.6)
LYMPHOCYTES NFR BLD: 19 % (ref 13–44)
MCH RBC QN AUTO: 30.2 PG (ref 26.1–32.9)
MCHC RBC AUTO-ENTMCNC: 32.5 G/DL (ref 31.4–35)
MCV RBC AUTO: 93.1 FL (ref 79.6–97.8)
MONOCYTES # BLD: 0.6 K/UL (ref 0.1–1.3)
MONOCYTES NFR BLD: 9 % (ref 4–12)
NEUTS SEG # BLD: 4.8 K/UL (ref 1.7–8.2)
NEUTS SEG NFR BLD: 71 % (ref 43–78)
NITRITE UR QL STRIP.AUTO: NEGATIVE
PH UR STRIP: 7.5 [PH] (ref 5–9)
PLATELET # BLD AUTO: 271 K/UL (ref 150–450)
PMV BLD AUTO: 12.9 FL (ref 10.8–14.1)
POTASSIUM SERPL-SCNC: 3.2 MMOL/L (ref 3.5–5.1)
PREALB SERPL-MCNC: 16.2 MG/DL (ref 18–35.7)
PROT SERPL-MCNC: 7.4 G/DL (ref 6.3–8.2)
PROT UR STRIP-MCNC: 100 MG/DL
PROTHROMBIN TIME: 15.2 SEC (ref 11.5–14.5)
PTH-INTACT SERPL-MCNC: 56.1 PG/ML (ref 14–72)
RBC # BLD AUTO: 4.5 M/UL (ref 4.05–5.25)
RBC #/AREA URNS HPF: ABNORMAL /HPF
SERVICE CMNT-IMP: NORMAL
SODIUM SERPL-SCNC: 146 MMOL/L (ref 136–145)
SP GR UR REFRACTOMETRY: 1.02 (ref 1–1.02)
SPECIMEN EXP DATE BLD: NORMAL
UROBILINOGEN UR QL STRIP.AUTO: 1 EU/DL (ref 0.2–1)
WBC # BLD AUTO: 6.8 K/UL (ref 4.3–11.1)
WBC URNS QL MICRO: ABNORMAL /HPF

## 2018-03-01 PROCEDURE — 73552 X-RAY EXAM OF FEMUR 2/>: CPT

## 2018-03-01 PROCEDURE — 84134 ASSAY OF PREALBUMIN: CPT | Performed by: ORTHOPAEDIC SURGERY

## 2018-03-01 PROCEDURE — 85025 COMPLETE CBC W/AUTO DIFF WBC: CPT | Performed by: EMERGENCY MEDICINE

## 2018-03-01 PROCEDURE — 82306 VITAMIN D 25 HYDROXY: CPT | Performed by: ORTHOPAEDIC SURGERY

## 2018-03-01 PROCEDURE — 81001 URINALYSIS AUTO W/SCOPE: CPT | Performed by: ORTHOPAEDIC SURGERY

## 2018-03-01 PROCEDURE — 77030005515 HC CATH URETH FOL14 BARD -B

## 2018-03-01 PROCEDURE — 99284 EMERGENCY DEPT VISIT MOD MDM: CPT | Performed by: EMERGENCY MEDICINE

## 2018-03-01 PROCEDURE — 0T9B70Z DRAINAGE OF BLADDER WITH DRAINAGE DEVICE, VIA NATURAL OR ARTIFICIAL OPENING: ICD-10-PCS | Performed by: EMERGENCY MEDICINE

## 2018-03-01 PROCEDURE — 65270000029 HC RM PRIVATE

## 2018-03-01 PROCEDURE — 74011250637 HC RX REV CODE- 250/637: Performed by: ORTHOPAEDIC SURGERY

## 2018-03-01 PROCEDURE — 86580 TB INTRADERMAL TEST: CPT | Performed by: INTERNAL MEDICINE

## 2018-03-01 PROCEDURE — 71045 X-RAY EXAM CHEST 1 VIEW: CPT

## 2018-03-01 PROCEDURE — 74011250637 HC RX REV CODE- 250/637: Performed by: INTERNAL MEDICINE

## 2018-03-01 PROCEDURE — 36415 COLL VENOUS BLD VENIPUNCTURE: CPT | Performed by: INTERNAL MEDICINE

## 2018-03-01 PROCEDURE — 80053 COMPREHEN METABOLIC PANEL: CPT | Performed by: EMERGENCY MEDICINE

## 2018-03-01 PROCEDURE — 85610 PROTHROMBIN TIME: CPT | Performed by: INTERNAL MEDICINE

## 2018-03-01 PROCEDURE — 51702 INSERT TEMP BLADDER CATH: CPT | Performed by: EMERGENCY MEDICINE

## 2018-03-01 PROCEDURE — 87641 MR-STAPH DNA AMP PROBE: CPT | Performed by: INTERNAL MEDICINE

## 2018-03-01 PROCEDURE — 74011000302 HC RX REV CODE- 302: Performed by: INTERNAL MEDICINE

## 2018-03-01 PROCEDURE — 86900 BLOOD TYPING SEROLOGIC ABO: CPT | Performed by: EMERGENCY MEDICINE

## 2018-03-01 PROCEDURE — 74011250636 HC RX REV CODE- 250/636: Performed by: ORTHOPAEDIC SURGERY

## 2018-03-01 PROCEDURE — 73502 X-RAY EXAM HIP UNI 2-3 VIEWS: CPT

## 2018-03-01 PROCEDURE — 83970 ASSAY OF PARATHORMONE: CPT | Performed by: ORTHOPAEDIC SURGERY

## 2018-03-01 RX ORDER — SODIUM CHLORIDE 0.9 % (FLUSH) 0.9 %
5-10 SYRINGE (ML) INJECTION EVERY 8 HOURS
Status: DISCONTINUED | OUTPATIENT
Start: 2018-03-01 | End: 2018-03-02

## 2018-03-01 RX ORDER — ACETAMINOPHEN 325 MG/1
650 TABLET ORAL EVERY 8 HOURS
Status: DISCONTINUED | OUTPATIENT
Start: 2018-03-01 | End: 2018-03-06 | Stop reason: HOSPADM

## 2018-03-01 RX ORDER — AMLODIPINE BESYLATE 10 MG/1
10 TABLET ORAL DAILY
COMMUNITY

## 2018-03-01 RX ORDER — SODIUM CHLORIDE 0.9 % (FLUSH) 0.9 %
5-10 SYRINGE (ML) INJECTION EVERY 8 HOURS
Status: DISCONTINUED | OUTPATIENT
Start: 2018-03-01 | End: 2018-03-02 | Stop reason: HOSPADM

## 2018-03-01 RX ORDER — SODIUM CHLORIDE 0.9 % (FLUSH) 0.9 %
5-10 SYRINGE (ML) INJECTION AS NEEDED
Status: DISCONTINUED | OUTPATIENT
Start: 2018-03-01 | End: 2018-03-02 | Stop reason: HOSPADM

## 2018-03-01 RX ORDER — LEVOTHYROXINE SODIUM 50 UG/1
50 TABLET ORAL
COMMUNITY

## 2018-03-01 RX ORDER — TRAMADOL HYDROCHLORIDE 50 MG/1
50 TABLET ORAL
Status: ON HOLD | COMMUNITY
End: 2018-03-06

## 2018-03-01 RX ORDER — METOPROLOL TARTRATE 50 MG/1
50 TABLET ORAL EVERY 12 HOURS
COMMUNITY

## 2018-03-01 RX ORDER — TRAMADOL HYDROCHLORIDE 50 MG/1
50 TABLET ORAL
Status: DISCONTINUED | OUTPATIENT
Start: 2018-03-01 | End: 2018-03-06 | Stop reason: HOSPADM

## 2018-03-01 RX ORDER — SODIUM CHLORIDE 9 MG/ML
2000 INJECTION, SOLUTION INTRAVENOUS CONTINUOUS
Status: DISCONTINUED | OUTPATIENT
Start: 2018-03-01 | End: 2018-03-02

## 2018-03-01 RX ORDER — HYDROMORPHONE HYDROCHLORIDE 1 MG/ML
1 INJECTION, SOLUTION INTRAMUSCULAR; INTRAVENOUS; SUBCUTANEOUS
Status: DISCONTINUED | OUTPATIENT
Start: 2018-03-01 | End: 2018-03-01

## 2018-03-01 RX ORDER — RIVASTIGMINE 13.3 MG/24H
1 PATCH, EXTENDED RELEASE TRANSDERMAL DAILY
COMMUNITY

## 2018-03-01 RX ORDER — AMIODARONE HYDROCHLORIDE 200 MG/1
200 TABLET ORAL DAILY
COMMUNITY

## 2018-03-01 RX ORDER — PRAVASTATIN SODIUM 40 MG/1
40 TABLET ORAL DAILY
COMMUNITY

## 2018-03-01 RX ORDER — HYDROMORPHONE HYDROCHLORIDE 2 MG/ML
0.5 INJECTION, SOLUTION INTRAMUSCULAR; INTRAVENOUS; SUBCUTANEOUS
Status: DISCONTINUED | OUTPATIENT
Start: 2018-03-01 | End: 2018-03-02

## 2018-03-01 RX ORDER — HYDROCODONE BITARTRATE AND ACETAMINOPHEN 7.5; 325 MG/1; MG/1
1 TABLET ORAL
Status: DISCONTINUED | OUTPATIENT
Start: 2018-03-01 | End: 2018-03-01

## 2018-03-01 RX ORDER — CEFAZOLIN SODIUM/WATER 2 G/20 ML
2 SYRINGE (ML) INTRAVENOUS
Status: COMPLETED | OUTPATIENT
Start: 2018-03-01 | End: 2018-03-02

## 2018-03-01 RX ORDER — ACETAMINOPHEN 325 MG/1
650 TABLET ORAL EVERY 8 HOURS
Status: DISCONTINUED | OUTPATIENT
Start: 2018-03-01 | End: 2018-03-01

## 2018-03-01 RX ORDER — SODIUM CHLORIDE 0.9 % (FLUSH) 0.9 %
5-10 SYRINGE (ML) INJECTION AS NEEDED
Status: DISCONTINUED | OUTPATIENT
Start: 2018-03-01 | End: 2018-03-02

## 2018-03-01 RX ORDER — SODIUM CHLORIDE, SODIUM LACTATE, POTASSIUM CHLORIDE, CALCIUM CHLORIDE 600; 310; 30; 20 MG/100ML; MG/100ML; MG/100ML; MG/100ML
75 INJECTION, SOLUTION INTRAVENOUS
Status: COMPLETED | OUTPATIENT
Start: 2018-03-01 | End: 2018-03-02

## 2018-03-01 RX ORDER — MEMANTINE HYDROCHLORIDE 28 MG/1
28 CAPSULE, EXTENDED RELEASE ORAL DAILY
COMMUNITY

## 2018-03-01 RX ORDER — FAMOTIDINE 20 MG/1
20 TABLET, FILM COATED ORAL DAILY
COMMUNITY

## 2018-03-01 RX ORDER — ESCITALOPRAM OXALATE 5 MG/1
5 TABLET ORAL DAILY
COMMUNITY

## 2018-03-01 RX ORDER — POTASSIUM CHLORIDE 20 MEQ/1
40 TABLET, EXTENDED RELEASE ORAL
Status: COMPLETED | OUTPATIENT
Start: 2018-03-01 | End: 2018-03-01

## 2018-03-01 RX ORDER — ACETAMINOPHEN 325 MG/1
325 TABLET ORAL 3 TIMES DAILY
COMMUNITY

## 2018-03-01 RX ORDER — OXYCODONE HYDROCHLORIDE 5 MG/1
5 TABLET ORAL
Status: DISCONTINUED | OUTPATIENT
Start: 2018-03-01 | End: 2018-03-06 | Stop reason: HOSPADM

## 2018-03-01 RX ORDER — FUROSEMIDE 20 MG/1
20 TABLET ORAL DAILY
COMMUNITY

## 2018-03-01 RX ORDER — LISINOPRIL 40 MG/1
40 TABLET ORAL DAILY
COMMUNITY

## 2018-03-01 RX ADMIN — ACETAMINOPHEN 650 MG: 325 TABLET ORAL at 19:03

## 2018-03-01 RX ADMIN — POTASSIUM CHLORIDE 40 MEQ: 20 TABLET, EXTENDED RELEASE ORAL at 19:02

## 2018-03-01 RX ADMIN — SODIUM CHLORIDE 2000 ML: 900 INJECTION, SOLUTION INTRAVENOUS at 19:02

## 2018-03-01 RX ADMIN — Medication 1 AMPULE: at 23:58

## 2018-03-01 RX ADMIN — Medication 10 ML: at 19:04

## 2018-03-01 RX ADMIN — TUBERCULIN PURIFIED PROTEIN DERIVATIVE 5 UNITS: 5 INJECTION, SOLUTION INTRADERMAL at 19:04

## 2018-03-01 NOTE — ED TRIAGE NOTES
Patient arrives via EMS from [facility]. Patient had fall on feb 19, had xrays done of hand, but not of hip. Patient began complaining about hip pain yesterday, and has confirmed right hip fracture. Ems reports patient complains of left hip pain. Unsure of actual fracture. Patient alert, primary language is [place], with communication barriers. Patient appears in no acute distress.

## 2018-03-01 NOTE — PROGRESS NOTES
ORTHO:    PATIENT TO BE ADMITTED TO ROOM 737 BY HOSPITALIST FOR RIGHT HIP FRACTURE. SURGERY PLANNED WITH DR. ALLEN TOMORROW. PLEASE KEEP NPO AFTER MIDNIGHT.

## 2018-03-01 NOTE — ED NOTES
TRANSFER - OUT REPORT:    Verbal report given to Eleanor Lernerconnor (name) on Pen Degroft  being transferred to [address] [place] [address] (unit) for routine progression of care       Report consisted of patients Situation, Background, Assessment and   Recommendations(SBAR). Information from the following report(s) ED Summary was reviewed with the receiving nurse. Lines:   Peripheral IV 03/01/18 Right Antecubital (Active)   Site Assessment Clean, dry, & intact 3/1/2018  3:26 PM   Phlebitis Assessment 0 3/1/2018  3:26 PM   Infiltration Assessment 0 3/1/2018  3:26 PM   Dressing Status Clean, dry, & intact 3/1/2018  3:26 PM        Opportunity for questions and clarification was provided.       Patient transported with:   Monitor

## 2018-03-01 NOTE — H&P
Hospitalist H&P/Consult Note     Admit Date:  3/1/2018  1:04 PM   Name:  Ap Echavarria   Age:  80 y.o.  :  1935   MRN:  [id number]   PCP:  Wing Lewis MD  Treatment Team: Attending Provider: Carter Alcocer MD; Primary Nurse: Mora Rea RN; Primary Nurse: Pawan Bolivar    HPI:   Pt is an 81 y/o F who presented from [facility] after being diagnosed with R fem neck fracture there. She suffered a fall on 18 which is believed to be the etiology per notes. She is not very talkative but denies complaints except for R hip pain, though controlled with pain meds. She denies CP, SOB, palpitations, fevers, chills, n/v, or other complaints NOS. Does not know her medical or surgical history. Per report she has dementia. DNR papers on chart from SNF. No family present. 10 systems reviewed and negative except as noted in HPI. No past medical history on file. No past surgical history on file. No Known Allergies   Social History   Substance Use Topics    Smoking status: Not on file    Smokeless tobacco: Not on file    Alcohol use Not on file      No family history on file. There is no immunization history on file for this patient. PTA Medications:  None       Objective:     Patient Vitals for the past 24 hrs:   Temp Pulse Resp BP SpO2   18 1524 - 61 - 174/74 97 %   18 1434 - 61 - 183/74 97 %   18 1412 - 60 - 169/72 95 %   18 1308 98.6 °F (37 °C) (!) 57 16 174/75 97 %     Oxygen Therapy  O2 Sat (%): 97 % (18 1524)  Pulse via Oximetry: 61 beats per minute (18 1524)  O2 Device: Room air (18 1308)  No intake or output data in the 24 hours ending 18 1559    Physical Exam:  General:    Well nourished. Alert. Pleasantly confused  Eyes:   Normal sclera. Extraocular movements intact. ENT:  Normocephalic, atraumatic. Moist mucous membranes  CV:   Regular rate and rhythm. No murmur, rub, or gallop.     Lungs:  Clear to auscultation bilaterally. No wheezing, rhonchi, or rales. Extremities: Warm and dry. No cyanosis or edema. Neurologic: CN II-XII grossly intact. Sensation intact. Skin:     No rashes or jaundice. No wounds. Psych:  Normal mood and affect. I reviewed the labs, imaging, EKGs, telemetry, and other studies done this admission. Data Review:   Recent Results (from the past 24 hour(s))   CBC WITH AUTOMATED DIFF    Collection Time: 03/01/18  2:18 PM   Result Value Ref Range    WBC 6.8 4.3 - 11.1 K/uL    RBC 4.50 4.05 - 5.25 M/uL    HGB 13.6 11.7 - 15.4 g/dL    HCT 41.9 35.8 - 46.3 %    MCV 93.1 79.6 - 97.8 FL    MCH 30.2 26.1 - 32.9 PG    MCHC 32.5 31.4 - 35.0 g/dL    RDW 13.9 11.9 - 14.6 %    PLATELET 721 637 - 880 K/uL    MPV 12.9 10.8 - 14.1 FL    DF AUTOMATED      NEUTROPHILS 71 43 - 78 %    LYMPHOCYTES 19 13 - 44 %    MONOCYTES 9 4.0 - 12.0 %    EOSINOPHILS 1 0.5 - 7.8 %    BASOPHILS 0 0.0 - 2.0 %    IMMATURE GRANULOCYTES 0 0.0 - 5.0 %    ABS. NEUTROPHILS 4.8 1.7 - 8.2 K/UL    ABS. LYMPHOCYTES 1.3 0.5 - 4.6 K/UL    ABS. MONOCYTES 0.6 0.1 - 1.3 K/UL    ABS. EOSINOPHILS 0.1 0.0 - 0.8 K/UL    ABS. BASOPHILS 0.0 0.0 - 0.2 K/UL    ABS. IMM. GRANS. 0.0 0.0 - 0.5 K/UL   METABOLIC PANEL, COMPREHENSIVE    Collection Time: 03/01/18  2:18 PM   Result Value Ref Range    Sodium 146 (H) 136 - 145 mmol/L    Potassium 3.2 (L) 3.5 - 5.1 mmol/L    Chloride 108 (H) 98 - 107 mmol/L    CO2 32 21 - 32 mmol/L    Anion gap 6 (L) 7 - 16 mmol/L    Glucose 88 65 - 100 mg/dL    BUN 15 8 - 23 MG/DL    Creatinine 0.87 0.6 - 1.0 MG/DL    GFR est AA >60 >60 ml/min/1.73m2    GFR est non-AA >60 >60 ml/min/1.73m2    Calcium 8.7 8.3 - 10.4 MG/DL    Bilirubin, total 0.6 0.2 - 1.1 MG/DL    ALT (SGPT) 46 12 - 65 U/L    AST (SGOT) 59 (H) 15 - 37 U/L    Alk.  phosphatase 97 50 - 136 U/L    Protein, total 7.4 6.3 - 8.2 g/dL    Albumin 2.9 (L) 3.2 - 4.6 g/dL    Globulin 4.5 (H) 2.3 - 3.5 g/dL    A-G Ratio 0.6 (L) 1.2 - 3.5         All Micro Results Procedure Component Value Units Date/Time    MSSA/MRSA SC BY PCR, NASAL SWAB [[id number]]     Order Status:  Sent Specimen:  Swab           Other Studies:  Xr Hip Rt W Or Wo Pelv 2-3 Vws    Result Date: 3/1/2018  Right hip series INDICATION: Fall in mid February 2018. Right hip pain FINDINGS: 3 images of the right hip show an impacted right subcapital femoral neck fracture. The bony pelvis is demineralized but intact. There is a moderate-sized rectal stool ball. Atherosclerotic calcifications in the soft tissues. IMPRESSION: Impacted right femoral neck fracture. Xr Femur Rt 2 Vs    Result Date: 3/1/2018  Right femur series INDICATION: Leg pain FINDINGS: 4 images of the right femur including crosstable lateral images show no femoral shaft fracture but there is an impacted subcapital femoral neck fracture. No hip dislocation. Atherosclerotic calcifications in the soft tissues. IMPRESSION: Subcapital right femoral neck fracture. Xr Chest Port    Result Date: 3/1/2018  Portable chest x-ray INDICATION: Hip fracture FINDINGS: No prior studies for comparison. Heart size upper limits of normal. Lungs are clear. No focal infiltrate or pneumothorax. Left costophrenic angle is minimally blunted. IMPRESSION: Possible tiny left pleural effusion. Assessment and Plan:     Hospital Problems as of 3/1/2018  Never Reviewed          Codes Class Noted - Resolved POA    * (Principal)Closed fracture of right hip ([address] ICD-10-CM: S72.001A  ICD-9-CM: 820.8  3/1/2018 - Present Yes        DNR (do not resuscitate) (Chronic) ICD-10-CM: Z66  ICD-9-CM: V49.86  3/1/2018 - Present Yes        Dementia without behavioral disturbance (Chronic) ICD-10-CM: F03.90  ICD-9-CM: 294.20  3/1/2018 - Present Yes                PLAN:  · Admit to ortho unit  · Ortho consulted. Perioperative cardiac risk assessment: moderate.     · Per history has afib; unknown if she is on [facility]; asked nursing to complete med rec with the rehab facility list.  PT/INR pending  · Fracture protocols started  · Pain control with narcotics as per fracture order set, or as per surgery  · Bedrest  · Continue home meds for stable chronic conditions  · Nursing med rec order placed as no meds listed. DVT ppx:  Per surgery  Discharge planning:  Ppd ordered. CM consulted.   PT/OT evals after surgery  Code status:  full  Estimated LOS:  Greater than 2 midnights  Risk:  high    Signed:  Cherelle Kelley MD

## 2018-03-01 NOTE — ED PROVIDER NOTES
HPI Comments: 63-year-old [facility] female presents to the department from [facility] after being diagnosed with a impacted right femoral neck fracture believed to be suffered after a fall on 2/19/18. From what I can gather on the medical records the patient has a history of atrial fibrillation, dementia, reflux, hypertension, hypothyroidism, anxiety, left basal ganglia CVA and recurrent UTIs. Patient had baseline appears to be nonambulatory and normally only bears weight with transfer. Patient is a 80 y.o. female presenting with hip pain. The history is provided by the patient and the EMS personnel. The history is limited by the condition of the patient. Hip Injury    This is a new problem. The current episode started more than 2 days ago. The problem occurs constantly. The problem has been gradually worsening. The pain is present in the right hip. The quality of the pain is described as aching. Associated symptoms include limited range of motion. The symptoms are aggravated by palpation and movement. She has tried rest for the symptoms. The treatment provided mild relief. There has been a history of trauma. No past medical history on file. No past surgical history on file. No family history on file. Social History     Social History    Marital status:      Spouse name: N/A    Number of children: N/A    Years of education: N/A     Occupational History    Not on file. Social History Main Topics    Smoking status: Not on file    Smokeless tobacco: Not on file    Alcohol use Not on file    Drug use: Not on file    Sexual activity: Not on file     Other Topics Concern    Not on file     Social History Narrative         ALLERGIES: Review of patient's allergies indicates no known allergies.     Review of Systems   Unable to perform ROS: Dementia       Vitals:    03/01/18 1308   BP: 174/75   Pulse: (!) 57   Resp: 16   Temp: 98.6 °F (37 °C)   SpO2: 97% Physical Exam   Constitutional: She appears well-developed and well-nourished. No distress. HENT:   Head: Normocephalic and atraumatic. Right Ear: Tympanic membrane and external ear normal.   Left Ear: Tympanic membrane and external ear normal.   Mouth/Throat: Oropharynx is clear and moist.   Eyes: Conjunctivae and EOM are normal. Pupils are equal, round, and reactive to light. Neck: Normal range of motion. Neck supple. No tracheal deviation present. Cardiovascular: Normal rate, regular rhythm, normal heart sounds and intact distal pulses. Exam reveals no gallop and no friction rub. No murmur heard. Pulmonary/Chest: Effort normal and breath sounds normal. No respiratory distress. She has no wheezes. Abdominal: Soft. Bowel sounds are normal. She exhibits no distension and no mass. There is no hepatosplenomegaly. There is no tenderness. There is no rebound and no guarding. Musculoskeletal: She exhibits edema and tenderness. Patient has bilateral hip tenderness without evidence of external rotation or deformity. Distal pulses are 2+ with 1+ edema bilateral lower extremities. Lymphadenopathy:     She has no cervical adenopathy. Neurological: She is alert. She is disoriented. She displays normal reflexes. Skin: Skin is warm and dry. No rash noted. She is not diaphoretic. No erythema. Psychiatric: She has a normal mood and affect. Patient will respond often with echoing what I say or few words of it. Nothing else intelligible   Nursing note and vitals reviewed.        MDM  Number of Diagnoses or Management Options  Closed fracture of right hip, initial encounter [facility]): new and requires workup     Amount and/or Complexity of Data Reviewed  Clinical lab tests: ordered and reviewed  Tests in the radiology section of CPT®: ordered and reviewed  Obtain history from someone other than the patient: yes  Review and summarize past medical records: yes  Discuss the patient with other providers: yes  Independent visualization of images, tracings, or specimens: yes    Risk of Complications, Morbidity, and/or Mortality  Presenting problems: high  Diagnostic procedures: moderate  Management options: high    Patient Progress  Patient progress: stable        ED Course       Procedures

## 2018-03-02 ENCOUNTER — ANESTHESIA (OUTPATIENT)
Dept: SURGERY | Age: 83
DRG: 482 | End: 2018-03-02
Payer: MEDICARE

## 2018-03-02 ENCOUNTER — APPOINTMENT (OUTPATIENT)
Dept: GENERAL RADIOLOGY | Age: 83
DRG: 482 | End: 2018-03-02
Attending: ORTHOPAEDIC SURGERY
Payer: MEDICARE

## 2018-03-02 PROBLEM — I48.91 ATRIAL FIBRILLATION (HCC): Status: ACTIVE | Noted: 2018-03-02

## 2018-03-02 PROBLEM — E78.5 HYPERLIPEMIA: Status: ACTIVE | Noted: 2018-03-02

## 2018-03-02 PROBLEM — E03.9 ACQUIRED HYPOTHYROIDISM: Status: ACTIVE | Noted: 2018-03-02

## 2018-03-02 LAB
ANION GAP SERPL CALC-SCNC: 7 MMOL/L (ref 7–16)
ATRIAL RATE: 61 BPM
BUN SERPL-MCNC: 13 MG/DL (ref 8–23)
CALCIUM SERPL-MCNC: 8.1 MG/DL (ref 8.3–10.4)
CALCULATED P AXIS, ECG09: 80 DEGREES
CALCULATED R AXIS, ECG10: 72 DEGREES
CALCULATED T AXIS, ECG11: 71 DEGREES
CHLORIDE SERPL-SCNC: 115 MMOL/L (ref 98–107)
CO2 SERPL-SCNC: 29 MMOL/L (ref 21–32)
CREAT SERPL-MCNC: 0.67 MG/DL (ref 0.6–1)
DIAGNOSIS, 93000: NORMAL
ERYTHROCYTE [DISTWIDTH] IN BLOOD BY AUTOMATED COUNT: 13.9 % (ref 11.9–14.6)
GLUCOSE SERPL-MCNC: 86 MG/DL (ref 65–100)
HCT VFR BLD AUTO: 36.6 % (ref 35.8–46.3)
HGB BLD-MCNC: 11.6 G/DL (ref 11.7–15.4)
MCH RBC QN AUTO: 29.5 PG (ref 26.1–32.9)
MCHC RBC AUTO-ENTMCNC: 31.7 G/DL (ref 31.4–35)
MCV RBC AUTO: 93.1 FL (ref 79.6–97.8)
MM INDURATION POC: 0 MM (ref 0–5)
P-R INTERVAL, ECG05: 164 MS
PLATELET # BLD AUTO: 236 K/UL (ref 150–450)
PMV BLD AUTO: 12.6 FL (ref 10.8–14.1)
POTASSIUM SERPL-SCNC: 3.7 MMOL/L (ref 3.5–5.1)
PPD POC: NORMAL NEGATIVE
Q-T INTERVAL, ECG07: 384 MS
QRS DURATION, ECG06: 80 MS
QTC CALCULATION (BEZET), ECG08: 386 MS
RBC # BLD AUTO: 3.93 M/UL (ref 4.05–5.25)
SODIUM SERPL-SCNC: 151 MMOL/L (ref 136–145)
VENTRICULAR RATE, ECG03: 61 BPM
WBC # BLD AUTO: 5.7 K/UL (ref 4.3–11.1)

## 2018-03-02 PROCEDURE — 77030020143 HC AIRWY LARYN INTUB CGAS -A: Performed by: ANESTHESIOLOGY

## 2018-03-02 PROCEDURE — 74011250636 HC RX REV CODE- 250/636: Performed by: NURSE PRACTITIONER

## 2018-03-02 PROCEDURE — 77030002933 HC SUT MCRYL J&J -A: Performed by: ORTHOPAEDIC SURGERY

## 2018-03-02 PROCEDURE — C1769 GUIDE WIRE: HCPCS | Performed by: ORTHOPAEDIC SURGERY

## 2018-03-02 PROCEDURE — 74011250636 HC RX REV CODE- 250/636: Performed by: ORTHOPAEDIC SURGERY

## 2018-03-02 PROCEDURE — 77030008467 HC STPLR SKN COVD -B: Performed by: ORTHOPAEDIC SURGERY

## 2018-03-02 PROCEDURE — 97162 PT EVAL MOD COMPLEX 30 MIN: CPT

## 2018-03-02 PROCEDURE — 74011250636 HC RX REV CODE- 250/636: Performed by: ANESTHESIOLOGY

## 2018-03-02 PROCEDURE — 80048 BASIC METABOLIC PNL TOTAL CA: CPT | Performed by: INTERNAL MEDICINE

## 2018-03-02 PROCEDURE — 76010000161 HC OR TIME 1 TO 1.5 HR INTENSV-TIER 1: Performed by: ORTHOPAEDIC SURGERY

## 2018-03-02 PROCEDURE — 74011250637 HC RX REV CODE- 250/637: Performed by: ANESTHESIOLOGY

## 2018-03-02 PROCEDURE — 65270000029 HC RM PRIVATE

## 2018-03-02 PROCEDURE — 74011000250 HC RX REV CODE- 250

## 2018-03-02 PROCEDURE — 74011250636 HC RX REV CODE- 250/636

## 2018-03-02 PROCEDURE — 73501 X-RAY EXAM HIP UNI 1 VIEW: CPT

## 2018-03-02 PROCEDURE — 77030011640 HC PAD GRND REM COVD -A: Performed by: ORTHOPAEDIC SURGERY

## 2018-03-02 PROCEDURE — 74011250637 HC RX REV CODE- 250/637: Performed by: ORTHOPAEDIC SURGERY

## 2018-03-02 PROCEDURE — C1713 ANCHOR/SCREW BN/BN,TIS/BN: HCPCS | Performed by: ORTHOPAEDIC SURGERY

## 2018-03-02 PROCEDURE — 0QH634Z INSERTION OF INTERNAL FIXATION DEVICE INTO RIGHT UPPER FEMUR, PERCUTANEOUS APPROACH: ICD-10-PCS | Performed by: ORTHOPAEDIC SURGERY

## 2018-03-02 PROCEDURE — 85027 COMPLETE CBC AUTOMATED: CPT | Performed by: INTERNAL MEDICINE

## 2018-03-02 PROCEDURE — 74011000258 HC RX REV CODE- 258: Performed by: NURSE PRACTITIONER

## 2018-03-02 PROCEDURE — 77030008703 HC TU ET UNCUF COVD -A: Performed by: ANESTHESIOLOGY

## 2018-03-02 PROCEDURE — 74011250637 HC RX REV CODE- 250/637: Performed by: NURSE PRACTITIONER

## 2018-03-02 PROCEDURE — 74011250637 HC RX REV CODE- 250/637: Performed by: INTERNAL MEDICINE

## 2018-03-02 PROCEDURE — 36415 COLL VENOUS BLD VENIPUNCTURE: CPT | Performed by: INTERNAL MEDICINE

## 2018-03-02 PROCEDURE — 76210000006 HC OR PH I REC 0.5 TO 1 HR: Performed by: ORTHOPAEDIC SURGERY

## 2018-03-02 PROCEDURE — 77030008477 HC STYL SATN SLP COVD -A: Performed by: ANESTHESIOLOGY

## 2018-03-02 PROCEDURE — 97161 PT EVAL LOW COMPLEX 20 MIN: CPT

## 2018-03-02 PROCEDURE — 76060000033 HC ANESTHESIA 1 TO 1.5 HR: Performed by: ORTHOPAEDIC SURGERY

## 2018-03-02 PROCEDURE — 77030018836 HC SOL IRR NACL ICUM -A: Performed by: ORTHOPAEDIC SURGERY

## 2018-03-02 PROCEDURE — 93005 ELECTROCARDIOGRAM TRACING: CPT | Performed by: NURSE PRACTITIONER

## 2018-03-02 PROCEDURE — 77030020782 HC GWN BAIR PAWS FLX 3M -B: Performed by: ANESTHESIOLOGY

## 2018-03-02 DEVICE — IMPLANTABLE DEVICE: Type: IMPLANTABLE DEVICE | Site: HIP | Status: FUNCTIONAL

## 2018-03-02 RX ORDER — HYDROMORPHONE HYDROCHLORIDE 2 MG/ML
0.5 INJECTION, SOLUTION INTRAMUSCULAR; INTRAVENOUS; SUBCUTANEOUS
Status: DISCONTINUED | OUTPATIENT
Start: 2018-03-02 | End: 2018-03-02 | Stop reason: HOSPADM

## 2018-03-02 RX ORDER — PRAVASTATIN SODIUM 20 MG/1
40 TABLET ORAL DAILY
Status: DISCONTINUED | OUTPATIENT
Start: 2018-03-03 | End: 2018-03-06 | Stop reason: HOSPADM

## 2018-03-02 RX ORDER — DOCUSATE SODIUM 100 MG/1
100 CAPSULE, LIQUID FILLED ORAL 2 TIMES DAILY
Status: DISCONTINUED | OUTPATIENT
Start: 2018-03-02 | End: 2018-03-06 | Stop reason: HOSPADM

## 2018-03-02 RX ORDER — DEXTROSE MONOHYDRATE 50 MG/ML
100 INJECTION, SOLUTION INTRAVENOUS CONTINUOUS
Status: DISCONTINUED | OUTPATIENT
Start: 2018-03-02 | End: 2018-03-02

## 2018-03-02 RX ORDER — ENOXAPARIN SODIUM 100 MG/ML
30 INJECTION SUBCUTANEOUS EVERY 24 HOURS
Status: DISCONTINUED | OUTPATIENT
Start: 2018-03-03 | End: 2018-03-02

## 2018-03-02 RX ORDER — FERROUS SULFATE, DRIED 160(50) MG
1 TABLET, EXTENDED RELEASE ORAL
Status: DISCONTINUED | OUTPATIENT
Start: 2018-03-02 | End: 2018-03-06 | Stop reason: HOSPADM

## 2018-03-02 RX ORDER — SODIUM CHLORIDE, SODIUM LACTATE, POTASSIUM CHLORIDE, CALCIUM CHLORIDE 600; 310; 30; 20 MG/100ML; MG/100ML; MG/100ML; MG/100ML
100 INJECTION, SOLUTION INTRAVENOUS CONTINUOUS
Status: DISCONTINUED | OUTPATIENT
Start: 2018-03-02 | End: 2018-03-02

## 2018-03-02 RX ORDER — ESCITALOPRAM OXALATE 10 MG/1
5 TABLET ORAL DAILY
Status: DISCONTINUED | OUTPATIENT
Start: 2018-03-03 | End: 2018-03-06 | Stop reason: HOSPADM

## 2018-03-02 RX ORDER — SODIUM CHLORIDE 9 MG/ML
50 INJECTION, SOLUTION INTRAVENOUS CONTINUOUS
Status: DISCONTINUED | OUTPATIENT
Start: 2018-03-02 | End: 2018-03-02

## 2018-03-02 RX ORDER — SODIUM CHLORIDE 0.9 % (FLUSH) 0.9 %
5-10 SYRINGE (ML) INJECTION AS NEEDED
Status: DISCONTINUED | OUTPATIENT
Start: 2018-03-02 | End: 2018-03-06 | Stop reason: HOSPADM

## 2018-03-02 RX ORDER — LIDOCAINE HYDROCHLORIDE 20 MG/ML
INJECTION, SOLUTION EPIDURAL; INFILTRATION; INTRACAUDAL; PERINEURAL AS NEEDED
Status: DISCONTINUED | OUTPATIENT
Start: 2018-03-02 | End: 2018-03-02 | Stop reason: HOSPADM

## 2018-03-02 RX ORDER — SODIUM CHLORIDE, SODIUM LACTATE, POTASSIUM CHLORIDE, CALCIUM CHLORIDE 600; 310; 30; 20 MG/100ML; MG/100ML; MG/100ML; MG/100ML
75 INJECTION, SOLUTION INTRAVENOUS CONTINUOUS
Status: DISCONTINUED | OUTPATIENT
Start: 2018-03-02 | End: 2018-03-04

## 2018-03-02 RX ORDER — ACETAMINOPHEN 325 MG/1
650 TABLET ORAL EVERY 8 HOURS
Status: DISCONTINUED | OUTPATIENT
Start: 2018-03-02 | End: 2018-03-02

## 2018-03-02 RX ORDER — ACETAMINOPHEN 10 MG/ML
1000 INJECTION, SOLUTION INTRAVENOUS
Status: COMPLETED | OUTPATIENT
Start: 2018-03-02 | End: 2018-03-02

## 2018-03-02 RX ORDER — AMIODARONE HYDROCHLORIDE 200 MG/1
200 TABLET ORAL DAILY
Status: DISCONTINUED | OUTPATIENT
Start: 2018-03-03 | End: 2018-03-06 | Stop reason: HOSPADM

## 2018-03-02 RX ORDER — METOPROLOL TARTRATE 50 MG/1
50 TABLET ORAL 2 TIMES DAILY
Status: DISCONTINUED | OUTPATIENT
Start: 2018-03-02 | End: 2018-03-06 | Stop reason: HOSPADM

## 2018-03-02 RX ORDER — FENTANYL CITRATE 50 UG/ML
INJECTION, SOLUTION INTRAMUSCULAR; INTRAVENOUS AS NEEDED
Status: DISCONTINUED | OUTPATIENT
Start: 2018-03-02 | End: 2018-03-02 | Stop reason: HOSPADM

## 2018-03-02 RX ORDER — DEXAMETHASONE SODIUM PHOSPHATE 4 MG/ML
INJECTION, SOLUTION INTRA-ARTICULAR; INTRALESIONAL; INTRAMUSCULAR; INTRAVENOUS; SOFT TISSUE AS NEEDED
Status: DISCONTINUED | OUTPATIENT
Start: 2018-03-02 | End: 2018-03-02 | Stop reason: HOSPADM

## 2018-03-02 RX ORDER — SODIUM CHLORIDE 0.9 % (FLUSH) 0.9 %
5-10 SYRINGE (ML) INJECTION EVERY 8 HOURS
Status: DISCONTINUED | OUTPATIENT
Start: 2018-03-02 | End: 2018-03-06 | Stop reason: HOSPADM

## 2018-03-02 RX ORDER — EPHEDRINE SULFATE 50 MG/ML
INJECTION, SOLUTION INTRAVENOUS AS NEEDED
Status: DISCONTINUED | OUTPATIENT
Start: 2018-03-02 | End: 2018-03-02 | Stop reason: HOSPADM

## 2018-03-02 RX ORDER — LIDOCAINE HYDROCHLORIDE 10 MG/ML
0.1 INJECTION INFILTRATION; PERINEURAL AS NEEDED
Status: DISCONTINUED | OUTPATIENT
Start: 2018-03-02 | End: 2018-03-02 | Stop reason: HOSPADM

## 2018-03-02 RX ORDER — OXYCODONE HYDROCHLORIDE 5 MG/1
5 TABLET ORAL
Status: DISCONTINUED | OUTPATIENT
Start: 2018-03-02 | End: 2018-03-02 | Stop reason: HOSPADM

## 2018-03-02 RX ORDER — FAMOTIDINE 20 MG/1
20 TABLET, FILM COATED ORAL ONCE
Status: COMPLETED | OUTPATIENT
Start: 2018-03-02 | End: 2018-03-02

## 2018-03-02 RX ORDER — PROPOFOL 10 MG/ML
INJECTION, EMULSION INTRAVENOUS AS NEEDED
Status: DISCONTINUED | OUTPATIENT
Start: 2018-03-02 | End: 2018-03-02 | Stop reason: HOSPADM

## 2018-03-02 RX ORDER — OXYCODONE HYDROCHLORIDE 5 MG/1
10 TABLET ORAL
Status: DISCONTINUED | OUTPATIENT
Start: 2018-03-02 | End: 2018-03-02 | Stop reason: HOSPADM

## 2018-03-02 RX ORDER — AMLODIPINE BESYLATE 10 MG/1
10 TABLET ORAL DAILY
Status: DISCONTINUED | OUTPATIENT
Start: 2018-03-03 | End: 2018-03-06 | Stop reason: HOSPADM

## 2018-03-02 RX ORDER — SODIUM CHLORIDE, SODIUM LACTATE, POTASSIUM CHLORIDE, CALCIUM CHLORIDE 600; 310; 30; 20 MG/100ML; MG/100ML; MG/100ML; MG/100ML
75 INJECTION, SOLUTION INTRAVENOUS CONTINUOUS
Status: DISCONTINUED | OUTPATIENT
Start: 2018-03-02 | End: 2018-03-02 | Stop reason: HOSPADM

## 2018-03-02 RX ORDER — LEVOTHYROXINE SODIUM 50 UG/1
50 TABLET ORAL
Status: DISCONTINUED | OUTPATIENT
Start: 2018-03-03 | End: 2018-03-06 | Stop reason: HOSPADM

## 2018-03-02 RX ORDER — RIVASTIGMINE 13.3 MG/24H
1 PATCH, EXTENDED RELEASE TRANSDERMAL DAILY
Status: DISCONTINUED | OUTPATIENT
Start: 2018-03-03 | End: 2018-03-06 | Stop reason: HOSPADM

## 2018-03-02 RX ORDER — LISINOPRIL 20 MG/1
40 TABLET ORAL DAILY
Status: DISCONTINUED | OUTPATIENT
Start: 2018-03-03 | End: 2018-03-06 | Stop reason: HOSPADM

## 2018-03-02 RX ORDER — MAG HYDROX/ALUMINUM HYD/SIMETH 200-200-20
30 SUSPENSION, ORAL (FINAL DOSE FORM) ORAL
Status: DISCONTINUED | OUTPATIENT
Start: 2018-03-02 | End: 2018-03-06 | Stop reason: HOSPADM

## 2018-03-02 RX ORDER — SODIUM CHLORIDE 0.9 % (FLUSH) 0.9 %
5-10 SYRINGE (ML) INJECTION EVERY 8 HOURS
Status: DISCONTINUED | OUTPATIENT
Start: 2018-03-02 | End: 2018-03-02 | Stop reason: HOSPADM

## 2018-03-02 RX ORDER — SODIUM CHLORIDE 0.9 % (FLUSH) 0.9 %
5-10 SYRINGE (ML) INJECTION AS NEEDED
Status: DISCONTINUED | OUTPATIENT
Start: 2018-03-02 | End: 2018-03-02 | Stop reason: HOSPADM

## 2018-03-02 RX ORDER — ONDANSETRON 2 MG/ML
4 INJECTION INTRAMUSCULAR; INTRAVENOUS
Status: DISCONTINUED | OUTPATIENT
Start: 2018-03-02 | End: 2018-03-06 | Stop reason: HOSPADM

## 2018-03-02 RX ORDER — ONDANSETRON 2 MG/ML
INJECTION INTRAMUSCULAR; INTRAVENOUS AS NEEDED
Status: DISCONTINUED | OUTPATIENT
Start: 2018-03-02 | End: 2018-03-02 | Stop reason: HOSPADM

## 2018-03-02 RX ORDER — SUCCINYLCHOLINE CHLORIDE 20 MG/ML
INJECTION INTRAMUSCULAR; INTRAVENOUS AS NEEDED
Status: DISCONTINUED | OUTPATIENT
Start: 2018-03-02 | End: 2018-03-02 | Stop reason: HOSPADM

## 2018-03-02 RX ORDER — OXYCODONE HYDROCHLORIDE 5 MG/1
5 TABLET ORAL
Status: DISCONTINUED | OUTPATIENT
Start: 2018-03-02 | End: 2018-03-02

## 2018-03-02 RX ADMIN — DEXAMETHASONE SODIUM PHOSPHATE 10 MG: 4 INJECTION, SOLUTION INTRA-ARTICULAR; INTRALESIONAL; INTRAMUSCULAR; INTRAVENOUS; SOFT TISSUE at 12:26

## 2018-03-02 RX ADMIN — FENTANYL CITRATE 25 MCG: 50 INJECTION, SOLUTION INTRAMUSCULAR; INTRAVENOUS at 12:39

## 2018-03-02 RX ADMIN — SUCCINYLCHOLINE CHLORIDE 100 MG: 20 INJECTION INTRAMUSCULAR; INTRAVENOUS at 12:21

## 2018-03-02 RX ADMIN — SODIUM CHLORIDE, SODIUM LACTATE, POTASSIUM CHLORIDE, AND CALCIUM CHLORIDE: 600; 310; 30; 20 INJECTION, SOLUTION INTRAVENOUS at 12:00

## 2018-03-02 RX ADMIN — EPHEDRINE SULFATE 5 MG: 50 INJECTION, SOLUTION INTRAVENOUS at 12:30

## 2018-03-02 RX ADMIN — Medication 10 ML: at 14:57

## 2018-03-02 RX ADMIN — SODIUM CHLORIDE, SODIUM LACTATE, POTASSIUM CHLORIDE, AND CALCIUM CHLORIDE 75 ML/HR: 600; 310; 30; 20 INJECTION, SOLUTION INTRAVENOUS at 10:25

## 2018-03-02 RX ADMIN — EPHEDRINE SULFATE 5 MG: 50 INJECTION, SOLUTION INTRAVENOUS at 12:46

## 2018-03-02 RX ADMIN — SODIUM CHLORIDE, SODIUM LACTATE, POTASSIUM CHLORIDE, AND CALCIUM CHLORIDE 75 ML/HR: 600; 310; 30; 20 INJECTION, SOLUTION INTRAVENOUS at 16:19

## 2018-03-02 RX ADMIN — Medication 10 ML: at 16:20

## 2018-03-02 RX ADMIN — PROPOFOL 150 MG: 10 INJECTION, EMULSION INTRAVENOUS at 12:06

## 2018-03-02 RX ADMIN — Medication 1 AMPULE: at 21:52

## 2018-03-02 RX ADMIN — ONDANSETRON 4 MG: 2 INJECTION INTRAMUSCULAR; INTRAVENOUS at 12:34

## 2018-03-02 RX ADMIN — Medication 2 G: at 12:30

## 2018-03-02 RX ADMIN — FENTANYL CITRATE 25 MCG: 50 INJECTION, SOLUTION INTRAMUSCULAR; INTRAVENOUS at 12:30

## 2018-03-02 RX ADMIN — CALCIUM CARBONATE 500 MG (1,250 MG)-VITAMIN D3 200 UNIT TABLET 1 TABLET: at 16:14

## 2018-03-02 RX ADMIN — LIDOCAINE HYDROCHLORIDE 100 MG: 20 INJECTION, SOLUTION EPIDURAL; INFILTRATION; INTRACAUDAL; PERINEURAL at 12:06

## 2018-03-02 RX ADMIN — OXYCODONE HYDROCHLORIDE 5 MG: 5 TABLET ORAL at 16:14

## 2018-03-02 RX ADMIN — ACETAMINOPHEN 1000 MG: 10 INJECTION, SOLUTION INTRAVENOUS at 13:45

## 2018-03-02 RX ADMIN — CEFAZOLIN SODIUM 1 G: 1 INJECTION, POWDER, FOR SOLUTION INTRAMUSCULAR; INTRAVENOUS at 21:49

## 2018-03-02 RX ADMIN — FAMOTIDINE 20 MG: 20 TABLET, FILM COATED ORAL at 10:47

## 2018-03-02 RX ADMIN — DOCUSATE SODIUM 100 MG: 100 CAPSULE, LIQUID FILLED ORAL at 16:14

## 2018-03-02 RX ADMIN — PROPOFOL 50 MG: 10 INJECTION, EMULSION INTRAVENOUS at 12:21

## 2018-03-02 RX ADMIN — EPHEDRINE SULFATE 5 MG: 50 INJECTION, SOLUTION INTRAVENOUS at 12:27

## 2018-03-02 NOTE — PROGRESS NOTES
TRANSFER - IN REPORT:    Verbal report received from Macho RN(name) on Pen Degroft  being received from PACU(unit) for routine progression of care      Report consisted of patients Situation, Background, Assessment and   Recommendations(SBAR). Information from the following report(s) SBAR, Kardex, Intake/Output and MAR was reviewed with the receiving nurse. Opportunity for questions and clarification was provided. Report given to primary RN, Hafsa Brown.

## 2018-03-02 NOTE — PROGRESS NOTES
Problem: Falls - Risk of  Goal: *Absence of Falls  Document Calvin Fall Risk and appropriate interventions in the flowsheet.    Outcome: Progressing Towards Goal  Fall Risk Interventions:  Mobility Interventions: Bed/chair exit alarm, Communicate number of staff needed for ambulation/transfer    Mentation Interventions: Bed/chair exit alarm, Adequate sleep, hydration, pain control, More frequent rounding    Medication Interventions: Bed/chair exit alarm, Patient to call before getting OOB    Elimination Interventions: Call light in reach, Patient to call for help with toileting needs    History of Falls Interventions: Bed/chair exit alarm, Door open when patient unattended, Room close to nurse's station

## 2018-03-02 NOTE — PROGRESS NOTES
Patient has PEG tube not currently in use. Mechanical soft diet ordered. Paperwork from [facility] stated that this was patient's current diet.

## 2018-03-02 NOTE — ANESTHESIA POSTPROCEDURE EVALUATION
Post-Anesthesia Evaluation and Assessment    Patient: Analia Gutierrez MRN: [id number]  SSN: [id number]    YOB: 1935  Age: 80 y.o. Sex: female       Cardiovascular Function/Vital Signs  Visit Vitals    /70 (BP 1 Location: Left arm, BP Patient Position: At rest)    Pulse 61    Temp 36.7 °C (98 °F)    Resp 13    Wt 44.7 kg (98 lb 8 oz)    SpO2 100%    Breastfeeding No       Patient is status post general anesthesia for Procedure(s):  RIGHT HIP PERCUTANEOUS PINNING CANNULATED SCREWS. Nausea/Vomiting: None    Postoperative hydration reviewed and adequate. Pain:  Pain Scale 1: Visual (03/02/18 1358)  Pain Intensity 1: 3 (03/02/18 1358)   Managed    Neurological Status:   Neuro (WDL): Exceptions to WDL (03/02/18 1358)  Neuro  Neurologic State: Eyes open to stimulus; Eyes open to voice;Sleeping (03/02/18 1358)  Orientation Level: Oriented to person (03/02/18 1316)   At baseline    Mental Status and Level of Consciousness: Arousable    Pulmonary Status:   O2 Device: Nasal cannula (03/02/18 1358)   Adequate oxygenation and airway patent    Complications related to anesthesia: None    Post-anesthesia assessment completed.  No concerns    Signed By: Connre Elmore MD     March 2, 2018

## 2018-03-02 NOTE — PERIOP NOTES
Called Ginger Pfeiffer NP regarding consent issue-verified Dr. Nanci Khalil aware of inability to reach family. She said he is aware. Dr. Yunier White made aware.

## 2018-03-02 NOTE — PROGRESS NOTES
Hospitalist Progress Note    Patient: Ap Cerna MRN: [id number]  SSN: [id number]    YOB: 1935  Age: 80 y.o. Sex: female      Admit Date: 3/1/2018    LOS: 1 day     Subjective:     From H&P: \"79 y/o F who presented from [facility] after being diagnosed with R fem neck fracture there. She suffered a fall on 2/19/18 which is believed to be the etiology per notes. She is not very talkative but denies complaints except for R hip pain, though controlled with pain meds. She denies CP, SOB, palpitations, fevers, chills, n/v, or other complaints NOS. Does not know her medical or surgical history. Per report she has dementia. DNR papers on chart from SNF. No family present. \"    3/2 - I saw the patient after surgery. She was still sleepy. Only shook her head to questions, so not sure if she understood. Staff has been trying to get in touch with family. Review of systems negative except stated above. Objective:     Visit Vitals    /70    Pulse 61    Temp 98 °F (36.7 °C)    Resp 16    Wt 44.7 kg (98 lb 8 oz)    SpO2 98%    Breastfeeding No      Oxygen Therapy  O2 Sat (%): 98 % (03/02/18 1413)  Pulse via Oximetry: 61 beats per minute (03/02/18 1413)  O2 Device: Nasal cannula (03/02/18 1358)  O2 Flow Rate (L/min): 2 l/min (03/02/18 1358)      Intake and Output:   Intake/Output Summary (Last 24 hours) at 03/02/18 1442  Last data filed at 03/02/18 1243   Gross per 24 hour   Intake              500 ml   Output              880 ml   Net             -380 ml         Physical Exam:   GENERAL: Drowsy, cooperative, no distress, appears stated age  EYE: conjunctivae/corneas clear. PERRL. THROAT & NECK: normal and no erythema or exudates noted. LUNG: clear to auscultation bilaterally  HEART: regular rate and rhythm, S1S2, no murmur, no JVD  ABDOMEN: soft, non-tender, non-distended.  Bowel sounds normal.   EXTREMITIES:  No edema, 2+ pedal/radial pulses bilaterally  SKIN: no rash or abnormalities  NEUROLOGIC: Drowsy. Cranial nerves 2-12 grossly intact. Lab/Data Review:  Recent Results (from the past 24 hour(s))   MSSA/MRSA SC BY PCR, NASAL SWAB    Collection Time: 03/01/18  6:02 PM   Result Value Ref Range    Special Requests: NO SPECIAL REQUESTS      Culture result:        SA target not detected. A MRSA NEGATIVE, SA NEGATIVE test result does not preclude MRSA or SA nasal colonization.    URINALYSIS W/ RFLX MICROSCOPIC    Collection Time: 03/01/18  6:05 PM   Result Value Ref Range    Color YELLOW      Appearance CLOUDY      Specific gravity 1.016 1.001 - 1.023      pH (UA) 7.5 5.0 - 9.0      Protein 100 (A) NEG mg/dL    Glucose NEGATIVE  mg/dL    Ketone NEGATIVE  NEG mg/dL    Bilirubin NEGATIVE  NEG      Blood SMALL (A) NEG      Urobilinogen 1.0 0.2 - 1.0 EU/dL    Nitrites NEGATIVE  NEG      Leukocyte Esterase NEGATIVE  NEG      WBC 0-3 0 /hpf    RBC 10-20 0 /hpf    Epithelial cells 5-10 0 /hpf    Bacteria 0 0 /hpf    Amorphous Crystals 1+ (H) 0   PROTHROMBIN TIME + INR    Collection Time: 03/01/18  6:19 PM   Result Value Ref Range    Prothrombin time 15.2 (H) 11.5 - 14.5 sec    INR 1.3     PTH INTACT    Collection Time: 03/01/18  6:19 PM   Result Value Ref Range    Calcium 8.8 8.3 - 10.4 MG/DL    PTH, Intact 56.1 14.0 - 72.0 pg/mL   PREALBUMIN    Collection Time: 03/01/18  6:19 PM   Result Value Ref Range    Prealbumin 16.2 (L) 18.0 - 35.7 MG/DL   EKG, 12 LEAD, INITIAL    Collection Time: 03/02/18  3:45 AM   Result Value Ref Range    Ventricular Rate 61 BPM    Atrial Rate 61 BPM    P-R Interval 164 ms    QRS Duration 80 ms    Q-T Interval 384 ms    QTC Calculation (Bezet) 386 ms    Calculated P Axis 80 degrees    Calculated R Axis 72 degrees    Calculated T Axis 71 degrees    Diagnosis       Normal sinus rhythm  Normal ECG  When compared with ECG of 02-MAR-2018 03:44,  Sinus rhythm is no longer with 2nd degree A-V block  QT has shortened  Confirmed by Akua Iba MD (), VICTORIA ROSARIO ([id number]) on 3/2/2018 6:46:80 AM     METABOLIC PANEL, BASIC    Collection Time: 03/02/18  5:25 AM   Result Value Ref Range    Sodium 151 (H) 136 - 145 mmol/L    Potassium 3.7 3.5 - 5.1 mmol/L    Chloride 115 (H) 98 - 107 mmol/L    CO2 29 21 - 32 mmol/L    Anion gap 7 7 - 16 mmol/L    Glucose 86 65 - 100 mg/dL    BUN 13 8 - 23 MG/DL    Creatinine 0.67 0.6 - 1.0 MG/DL    GFR est AA >60 >60 ml/min/1.73m2    GFR est non-AA >60 >60 ml/min/1.73m2    Calcium 8.1 (L) 8.3 - 10.4 MG/DL   CBC W/O DIFF    Collection Time: 03/02/18  5:25 AM   Result Value Ref Range    WBC 5.7 4.3 - 11.1 K/uL    RBC 3.93 (L) 4.05 - 5.25 M/uL    HGB 11.6 (L) 11.7 - 15.4 g/dL    HCT 36.6 35.8 - 46.3 %    MCV 93.1 79.6 - 97.8 FL    MCH 29.5 26.1 - 32.9 PG    MCHC 31.7 31.4 - 35.0 g/dL    RDW 13.9 11.9 - 14.6 %    PLATELET 076 843 - 357 K/uL    MPV 12.6 10.8 - 14.1 FL       Imaging:  Xr Hip Rt W Or Wo Pelv 2-3 Vws    Result Date: 3/1/2018  IMPRESSION: Impacted right femoral neck fracture. Xr Hip Rt During Or Proc    Result Date: 3/2/2018  Impression: Intraoperative fixation as above. Xr Femur Rt 2 Vs    Result Date: 3/1/2018  IMPRESSION: Subcapital right femoral neck fracture. Xr Chest Port    Result Date: 3/1/2018  IMPRESSION: Possible tiny left pleural effusion. Cultures: All Micro Results     Procedure Component Value Units Date/Time    MSSA/MRSA SC BY PCR, NASAL SWAB [[id number]] Collected:  03/01/18 7742    Order Status:  Completed Specimen:  Swab Updated:  03/01/18 2044     Special Requests: NO SPECIAL REQUESTS        Culture result:         SA target not detected. A MRSA NEGATIVE, SA NEGATIVE test result does not preclude MRSA or SA nasal colonization.     MSSA/MRSA SC BY PCR, NASAL SWAB [[id number]]     Order Status:  Sent Specimen:  Swab           Assessment/Plan:     Principal Problem:    Closed fracture of right hip ([address] (3/1/2018)  - S/P Intramedullary Pin  - Appears to be doing well  - PT/OT  - Pain control per ortho    Active Problems:    Dementia without behavioral disturbance (3/1/2018)  - Exelon patch  - Lexapro  - Hold Namenda      Atrial Fibrillation (3/2/2018)  - Stable  - Continue Amiodarone and Lopressor  - Restart Eliquis when ok with ortho      Essential hypertension (3/1/2018)  - Stable  - Restart Amlodipine  - Restart Lisinopril  - Restart Lopressor      Hyperlipidemia (3/2/2018)  - Continue Pravastain      DNR (do not resuscitate) (3/1/2018)  - Per SNF paperwork    Dispo - Discharge to rehab    DIET NPO EXCEPT MEDS With Ice Chips  DIET NUTRITIONAL SUPPLEMENTS All Meals; Magic Cups  DIET NUTRITIONAL SUPPLEMENTS All Meals;  Ensure Enlive    DVT Prophylaxis: Lovenox      Signed By: Jenna Bills DO     March 2, 2018

## 2018-03-02 NOTE — PROGRESS NOTES
Problem: Nutrition Deficit  Goal: *Optimize nutritional status  Nutrition  Reason for assessment: Referral received from nursing admission Malnutrition Screening Tool for recently lost unsure amount of weight without trying and eating poorly due to decreased appetite. Assessment:   Diet order(s): NPO, magic cup TID and ensure enlive TID ordered  Food/Nutrition Patient History:  The patient is noted to have a h/o dementia, chinese speaking, no family at bedside. She is s/p repair of hip fracture today. Noted h/o CVA and she does have a PEG in place. According to her nurse at [place] [id number], they have not used her PEG for nutrition in some time and they just routinely flush it. She was receiving magic cups at facility per RN. Anthropometrics: Ht: Per rehab facility: 5 ft,  Weight: 44.7 kg (98 lb 8 oz), Weight Source: Bed scale 3/1,   BMI: 19.1; BMI class of underweight for age. Per RN at [facility]: The last weight they have on file for this patient is 109 pounds. Potential for an 11 pound, 10.1% weight loss (unknown time frame)  Macronutrient needs:  EER:  2602-2528 kcal /day (30-35 kcal/kg listed BW)  EPR:  45-59 grams protein/day (1-1.3 grams/kg listed BW)  Intake/Comparative Standards: Per RD meal rounds: patient NPO, meeting 0% of needs. No recorded meal intakes. Nutrition Diagnosis: Inadequate oral intake r/t decreased ability to consume sufficient oral intake as evidenced by patient NPO, meeting 0% of needs, underweight for age, and potential weight loss noted above. Intervention:  Meals and snacks: Per MD  Nutrition Supplement Therapy: continue ensure enlive TID and magic cup TID   EN: May need to consider bolusing ensure enlive via PEG if patient is unable to meet ~75% of needs via oral intake. Discharge nutrition plan: Too soon to determine.   Coordination of Nutrition Care: [facility] facility and spoke with the patient's Gabbi Jerome, MS, RD, LD, [phone]

## 2018-03-02 NOTE — BRIEF OP NOTE
BRIEF OPERATIVE NOTE    Date of Procedure: 3/2/2018   Preoperative Diagnosis: fractured right hip  Postoperative Diagnosis: Right femoral neck fracture    Procedure(s):  RIGHT HIP PERCUTANEOUS PINNING CANNULATED SCREWS  Surgeon(s) and Role:     * Horace Stoner MD - Primary         Assistant Staff: None      Surgical Staff:  Circ-1: Lena Murray RN  Scrub Tech-1: Kimmie Irwin  Scrub Tech-3: Elenore Nim  Event Time In   Incision Start 1233   Incision Close 1248     Anesthesia: Spinal   Estimated Blood Loss: 50 cc  Specimens: * No specimens in log *   Findings: none   Complications: none  Implants:   Implant Name Type Inv.  Item Serial No.  Lot No. LRB No. Used Action   SCR BNE HERMELINDA 16MM THRD 6.5X70 --  - [id number]  SCR BNE HERMELINDA 16MM THRD 6.5X70 --   [facility] [id number] Right 1 Implanted   SCR BNE HERMELINDA 16MM THRD 6.5X75 --  - [id number]  SCR BNE HERMELINDA 16MM THRD 6.5X75 --   [facility] [id number] Right 1 Implanted   SCR BNE HERMELINDA 16MM THRD 6.5X80 --  - [id number]   SCR BNE HERMELINDA 16MM THRD 6.5X80 --    [facility] [id number] Right 1 Implanted

## 2018-03-02 NOTE — PROGRESS NOTES
Attempted to speak with daughter Lillian Severance via phone [phone], message left for return call to see if she would like for pt to return to [facility] rehab post surgery or what her plan would be, asked to have her return a call to speak with her about this. Checking with staff at [facility] if pt can return for STR at [place]. PPD placed, PT/OT will be ordered. CM will await return call from daughter or will plan for readmission to [facility] at [facility].     Care Management Interventions  Transition of Care Consult (CM Consult): Discharge Planning  Current Support Network: [address]

## 2018-03-02 NOTE — ANESTHESIA PREPROCEDURE EVALUATION
Anesthetic History               Review of Systems / Medical History  Patient summary reviewed and pertinent labs reviewed    Pulmonary                   Neuro/Psych         TIA     Cardiovascular    Hypertension        Dysrhythmias (PAF) : atrial fibrillation  Hyperlipidemia      Comments: On Elaquis. Marixa Nuno at a facility yesterday, does not have record of last dose. On beta blocker   GI/Hepatic/Renal     GERD           Endo/Other      Hypothyroidism       Other Findings   Comments: Dementia    [facility] Mandarin [facility], but understands simple commands in [place]. Left message with daughter Jeff Flowers [phone]. Physical Exam    Airway  Mallampati: I  TM Distance: 4 - 6 cm  Neck ROM: normal range of motion   Mouth opening: Normal     Cardiovascular    Rhythm: regular  Rate: normal         Dental    Dentition: Edentulous     Pulmonary  Breath sounds clear to auscultation               Abdominal  GI exam deferred       Other Findings            Anesthetic Plan    ASA: 3  Anesthesia type: general            Anesthetic plan and risks discussed with: Patient      Assume patient took Elaquis yesterday, will need GA. Daughter will need to sign consent. Note patient is on rivastigmine. Pt may be sensitive to sux and resistant to nondepolarizing NMBs.

## 2018-03-02 NOTE — PROGRESS NOTES
ORTHO:    MULTIPLE ATTEMPTS MADE TO CONTACT FAMILY TO GAIN CONSENT FOR PROCEDURE. CALLED [facility] WHERE PATIENT IS A RESIDENT AND RECEIVED NUMBER THAT IS ALREADY ON FILE. [facility] REPORTS FAMILY IS VERY DIFFICULT TO GET A HOLD OF AND RETURNS CALLS WEEKS LATER. MESSAGE LEFT ON VOICEMAIL FOR DAUGHTER TO CALL BACK, [phone]. WILL CONTINUE TO ATTEMPT AS PATIENT CANNOT CONSENT FOR HERSELF AND SPEAKS VERY LITTLE ENGLISH.  PHONE IN THE ROOM; HOWEVER, PATIENT IS CONFUSED AND HAS LITTLE RESPONSE TO EVEN HER PRIMARY LANGUAGE.

## 2018-03-02 NOTE — PERIOP NOTES
TRANSFER - OUT REPORT:    Verbal report given to Irene Hoffman RN(name) on Pen Eryn  being transferred to [facility](unit) for routine post - op       Report consisted of patients Situation, Background, Assessment and   Recommendations(SBAR). Information from the following report(s) SBAR, Kardex, OR Summary, Procedure Summary, Intake/Output and MAR was reviewed with the receiving nurse. Lines:   Peripheral IV 03/01/18 Right Antecubital (Active)   Site Assessment Clean, dry, & intact 3/2/2018  1:58 PM   Phlebitis Assessment 0 3/2/2018  1:58 PM   Infiltration Assessment 0 3/2/2018  1:58 PM   Dressing Status Clean, dry, & intact 3/2/2018  1:58 PM   Dressing Type Tape;Transparent 3/2/2018  1:16 PM   Hub Color/Line Status Pink; Infusing 3/2/2018  1:16 PM        Opportunity for questions and clarification was provided. Patient transported with:   O2 @ 2 liters    VTE prophylaxis orders have been written for Pen Degroft. Patient and family given floor number and nurses name. Family updated re: pt status after security code verified.

## 2018-03-02 NOTE — PROGRESS NOTES
Problem: Mobility Impaired (Adult and Pediatric)  Goal: *Acute Goals and Plan of Care (Insert Text)  STG:  (1.)Ms. Stephy Cobb will move from supine to sit and sit to supine , scoot up and down and roll side to side with MODERATE ASSIST within 3 treatment day(s). (2.)Ms. Stehpy Cobb will transfer from bed to chair and chair to bed with MAXIMAL ASSIST using the least restrictive device within 3 treatment day(s). (3.)Ms. Stephy Cobb will ambulate with MAXIMAL ASSIST for 15 feet with the least restrictive device within 3 treatment day(s). (4.)Ms. Stephy Cobb will participate in therapeutic activity/exerices x 12 minutes for increased strength within 3 days. LTG:  (1.)Ms. Stephy Cobb will move from supine to sit and sit to supine , scoot up and down and roll side to side in bed with MINIMAL ASSIST within 7 treatment day(s). (2.)Ms. Stephy Cobb will transfer from bed to chair and chair to bed with MODERATE ASSIST using the least restrictive device within 7 treatment day(s). (3.)Ms. Stephy Cobb will ambulate with MODERATE ASSIST for 50 feet with the least restrictive device within 7 treatment day(s). (4.)Ms. Stephy Cobb will participate in therapeutic activity/exerices x 23 minutes for increased strength within 7 days.     ________________________________________________________________________________________________      PHYSICAL THERAPY: Initial Assessment, PM 3/2/2018  INPATIENT: Hospital Day: 2  [place] / Product Type: Medicare /    R ANTIONE WBDA     NAME/AGE/GENDER: Ap Cobb is a 80 y.o. female   PRIMARY DIAGNOSIS: Hip fracture ([address]  fractured right hip Closed fracture of right hip (HCC) Closed fracture of right hip (HCC)  Procedure(s) (LRB):  RIGHT HIP PERCUTANEOUS PINNING CANNULATED SCREWS (Right)  Day of Surgery  ICD-10: Treatment Diagnosis:   · Generalized Muscle Weakness (M62.81)  · Difficulty in walking, Not elsewhere classified (R26.2)  · Other abnormalities of gait and mobility (R26.89)  · History of falling (Z91.81)   Precaution/Allergies:  Review of patient's allergies indicates no known allergies. ASSESSMENT:     Ms. Talon Ramirez is a 80 y.o. female in the hospital for the above who was supine in bed upon arrival.  Per chart pt came from [facility] after falling. She appears to understand but not speak English but also has a history of dementia. Unable to get any background information from pt who was alert but did not verbalize much throughout evaluation. Pt presents to PT with weakness and decreased AROM in B LEs. Pt required max assist x 2 for bed mobility and has impaired sitting balance with increased R lean. Pt presents like she has a history of CVA with R side affected and increased R UE tone observed. Pt required total A x 2 for stand-pivot to bedside chair. Pt could benefit from skilled PT to address earlier deficits. This section established at most recent assessment   PROBLEM LIST (Impairments causing functional limitations):  1. Decreased Strength  2. Decreased ADL/Functional Activities  3. Decreased Transfer Abilities  4. Decreased Ambulation Ability/Technique  5. Decreased Balance  6. Decreased Cognition   INTERVENTIONS PLANNED: (Benefits and precautions of physical therapy have been discussed with the patient.)  1. Balance Exercise  2. Bed Mobility  3. Family Education  4. Gait Training  5. Therapeutic Activites  6. Therapeutic Exercise/Strengthening  7. Transfer Training  8. Group Therapy     TREATMENT PLAN: Frequency/Duration: twice daily for duration of hospital stay  Rehabilitation Potential For Stated Goals: Vida Andersen REHABILITATION/EQUIPMENT: (at time of discharge pending progress): Due to the probability of continued deficits (see above) this patient will likely need continued skilled physical therapy after discharge.   Equipment:    None at this time              HISTORY:   History of Present Injury/Illness (Reason for Referral):  S/P RIGHT HIP PERCUTANEOUS PINNING CANNULATED SCREWS (Right)  Past Medical History/Comorbidities:   Ms. Ruba Nair  has a past medical history of Arrhythmia; Dementia; GERD (gastroesophageal reflux disease); Hyperlipidemia; Hypertension; PVD (peripheral vascular disease) ([facility] [address] Stroke [facility]); and Thyroid disease. Ms. Ruba Nair  has a past surgical history that includes hx other surgical.  Social History/Living Environment:   Home Environment: [address] [place] [address] Name: [facility]  # Steps to Enter: 0  One/Two Story Residence: One story  Living Alone: No  Support Systems: Skilled nursing facility  Patient Expects to be Discharged to[de-identified] Rehabilitation facility  Current DME Used/Available at Home: None  Prior Level of Function/Work/Activity:  Lives in a SNF per chart. Number of Personal Factors/Comorbidities that affect the Plan of Care: 1-2: MODERATE COMPLEXITY   EXAMINATION:   Most Recent Physical Functioning:   Gross Assessment:  AROM: Generally decreased, functional  Strength: Grossly decreased, non-functional               Posture:     Balance:  Sitting: Impaired  Sitting - Static: Poor (constant support)  Sitting - Dynamic: Poor (constant support)  Standing: Impaired  Standing - Static: Poor  Standing - Dynamic : Poor Bed Mobility:  Supine to Sit: Maximum assistance;Assist x2  Scooting: Maximum assistance;Assist x2  Wheelchair Mobility:     Transfers:  Sit to Stand: Maximum assistance;Assist x2  Stand to Sit: Maximum assistance;Assist x2  Bed to Chair: Total assistance;Assist x2  Gait:            Body Structures Involved:  1. Nerves  2. Bones  3. Joints  4. Muscles Body Functions Affected:  1. Mental  2. Neuromusculoskeletal  3. Movement Related Activities and Participation Affected:  1. Learning and Applying Knowledge  2. General Tasks and Demands  3. Mobility  4. Self Care  5. Domestic Life  6.  Community, Social and [place]   Number of elements that affect the Plan of Care: 4+: HIGH COMPLEXITY CLINICAL PRESENTATION:   Presentation: Evolving clinical presentation with changing clinical characteristics: MODERATE COMPLEXITY   CLINICAL DECISION MAKIN [address] Mobility Inpatient Short Form  How much difficulty does the patient currently have. .. Unable A Lot A Little None   1. Turning over in bed (including adjusting bedclothes, sheets and blankets)? [] 1   [] 2   [x] 3   [] 4   2. Sitting down on and standing up from a chair with arms ( e.g., wheelchair, bedside commode, etc.)   [] 1   [x] 2   [] 3   [] 4   3. Moving from lying on back to sitting on the side of the bed? [] 1   [x] 2   [] 3   [] 4   How much help from another person does the patient currently need. .. Total A Lot A Little None   4. Moving to and from a bed to a chair (including a wheelchair)? [x] 1   [] 2   [] 3   [] 4   5. Need to walk in hospital room? [x] 1   [] 2   [] 3   [] 4   6. Climbing 3-5 steps with a railing? [x] 1   [] 2   [] 3   [] 4   © 2007, Trustees of [facility], under license to [employer]. All rights reserved      Score:  Initial: 10 Most Recent: X (Date: -- )    Interpretation of Tool:  Represents activities that are increasingly more difficult (i.e. Bed mobility, Transfers, Gait). Score 24 23 22-20 19-15 14-10 9-7 6     Modifier CH CI CJ CK CL CM CN      ? Mobility - Walking and Moving Around:     - CURRENT STATUS: CL - 60%-79% impaired, limited or restricted    - GOAL STATUS: CK - 40%-59% impaired, limited or restricted    - D/C STATUS:  ---------------To be determined---------------  [place] / Product Type: Medicare /      Medical Necessity:     · Patient demonstrates fair rehab potential due to higher previous functional level. Reason for Services/Other Comments:  · Patient continues to require skilled intervention due to decreased functional mobility and balance.    Use of outcome tool(s) and clinical judgement create a POC that gives a: Questionable prediction of patient's progress: MODERATE COMPLEXITY            TREATMENT:   (In addition to Assessment/Re-Assessment sessions the following treatments were rendered)   Pre-treatment Symptoms/Complaints:  Nonverbal mostly  Pain: Initial:   Pain Intensity 1: 2  Post Session:  2/10     Assessment/Reassessment only, no treatment provided today    Braces/Orthotics/Lines/Etc:   · mantilla catheter  · O2 Device: Nasal cannula  Treatment/Session Assessment:    · Response to Treatment:  Poorly given dementia. · Interdisciplinary Collaboration:   o Physical Therapist  o Registered Nurse  o Rehabilitation Attendant  · After treatment position/precautions:   o Up in chair  o Bed alarm/tab alert on  o Bed/Chair-wheels locked  o Call light within reach   · Compliance with Program/Exercises: Will assess as treatment progresses. · Recommendations/Intent for next treatment session: \"Next visit will focus on advancements to more challenging activities and reduction in assistance provided\".   Total Treatment Duration:  PT Patient Time In/Time Out  Time In: 1536  Time Out: [facility] Atul, PT, DPT

## 2018-03-02 NOTE — PERIOP NOTES
TRANSFER - IN REPORT:    Verbal report received from Estela Walter RN on Pen Degroft  being received from room 737 for routine progression of care      Report consisted of patients Situation, Background, Assessment and   Recommendations(SBAR). Information from the following report(s) SBAR, Kardex and MAR was reviewed with the receiving nurse. Opportunity for questions and clarification was provided. Assessment completed upon patients arrival to unit and care assumed.

## 2018-03-02 NOTE — OP NOTES
Operative Report    Date of Surgery: 3/2/2018       Preoperative Diagnosis: fractured right hip     Postoperative Diagnosis: Right femoral neck fracture     Procedure: Procedure(s):  RIGHT HIP PERCUTANEOUS PINNING CANNULATED SCREWS     Surgeon(s) and Role:     * Ross Katz MD - Primary     Anesthesia: Spinal     Procedure in Detail:    The patient was brought to the operative suite and placed in the supine position. After adequate anesthesia was achieved, the patient was placed upon the fracture table. The foot holders and peroneal posts were well padded. Right hip was then prepped and draped in the usual sterile fashion after fluoroscopy confirms that her impacted valgus femoral neck fracture was in a stable position. A 3 cm incision is then made over the lateral aspect of the proximal femur. Hemostasis was achieved with Bovie cautery. The guide pin for the cannulated screw was inserted through the lateral cortex beginning superior to the lesser trochanter. AP and lateral fluoroscopic images confirmed its position as it runs up along the calcar into the subchondral bone to the head. Depth gauge was used to determine the appropriate length of the screw and the appropriate length, partially threaded cannulated screw, was passed over the guidewire with excellent purchase. Two additional screws were placed, both superior, one anterior and one posterior. All three screws were parallel in both planes. The wounds were then irrigated with normal saline and closed in layers. Sterile compressive dressings were applied. The patient was then removed from the fracture table and transferred to the recovery room alert and oriented under the care of anesthesia. Estimated Blood Loss:  50 cc    Tourniquet Time: * No tourniquets in log *    Implants/Hardware:   Implant Name Type Inv.  Item Serial No.  Lot No. LRB No. Used Action   [facility] BNE HERMELINDA 16MM THRD 6.5X70 --  - [id number]  [facility] BNE HERMELINDA 16MM THRD 6.5X70 -- [address] [id number] Right 1 Implanted   SCR RUBYE HERMELINDA 16MM THRD 6.5X75 --  - [id number]  SCR RUBYE HERMELINDA 16MM THRD 6.5X75 --   [address] [id number] Right 1 Implanted   SCR RUBYE HERMELINDA 16MM THRD 6.5X80 --  - [id number]   SCR ZACH HERMELINDA 16MM THRD 6.5X80 --    [address] [id number] Right 1 Implanted        Fluids: See anesthesia record.     Closure: Primary    Complications: None    Signed By: Hasmukh Burnham MD              March 2, 2018

## 2018-03-03 LAB
25(OH)D3+25(OH)D2 SERPL-MCNC: 26.2 NG/ML (ref 30–100)
ANION GAP SERPL CALC-SCNC: 9 MMOL/L (ref 7–16)
BASOPHILS # BLD: 0 K/UL (ref 0–0.2)
BASOPHILS NFR BLD: 0 % (ref 0–2)
BUN SERPL-MCNC: 17 MG/DL (ref 8–23)
CALCIUM SERPL-MCNC: 8.6 MG/DL (ref 8.3–10.4)
CHLORIDE SERPL-SCNC: 111 MMOL/L (ref 98–107)
CO2 SERPL-SCNC: 28 MMOL/L (ref 21–32)
CREAT SERPL-MCNC: 0.82 MG/DL (ref 0.6–1)
DIFFERENTIAL METHOD BLD: ABNORMAL
EOSINOPHIL # BLD: 0 K/UL (ref 0–0.8)
EOSINOPHIL NFR BLD: 0 % (ref 0.5–7.8)
ERYTHROCYTE [DISTWIDTH] IN BLOOD BY AUTOMATED COUNT: 14.1 % (ref 11.9–14.6)
GLUCOSE SERPL-MCNC: 93 MG/DL (ref 65–100)
HCT VFR BLD AUTO: 38.6 % (ref 35.8–46.3)
HGB BLD-MCNC: 12.3 G/DL (ref 11.7–15.4)
IMM GRANULOCYTES # BLD: 0 K/UL (ref 0–0.5)
IMM GRANULOCYTES NFR BLD AUTO: 0 % (ref 0–5)
LYMPHOCYTES # BLD: 0.6 K/UL (ref 0.5–4.6)
LYMPHOCYTES NFR BLD: 9 % (ref 13–44)
MCH RBC QN AUTO: 29.6 PG (ref 26.1–32.9)
MCHC RBC AUTO-ENTMCNC: 31.9 G/DL (ref 31.4–35)
MCV RBC AUTO: 93 FL (ref 79.6–97.8)
MM INDURATION POC: NORMAL MM (ref 0–5)
MONOCYTES # BLD: 0.2 K/UL (ref 0.1–1.3)
MONOCYTES NFR BLD: 3 % (ref 4–12)
NEUTS SEG # BLD: 6.5 K/UL (ref 1.7–8.2)
NEUTS SEG NFR BLD: 88 % (ref 43–78)
PLATELET # BLD AUTO: 291 K/UL (ref 150–450)
PMV BLD AUTO: 12.9 FL (ref 10.8–14.1)
POTASSIUM SERPL-SCNC: 4.4 MMOL/L (ref 3.5–5.1)
PPD POC: NEGATIVE NEGATIVE
RBC # BLD AUTO: 4.15 M/UL (ref 4.05–5.25)
SODIUM SERPL-SCNC: 148 MMOL/L (ref 136–145)
WBC # BLD AUTO: 7.3 K/UL (ref 4.3–11.1)

## 2018-03-03 PROCEDURE — 97166 OT EVAL MOD COMPLEX 45 MIN: CPT

## 2018-03-03 PROCEDURE — 36415 COLL VENOUS BLD VENIPUNCTURE: CPT | Performed by: NURSE PRACTITIONER

## 2018-03-03 PROCEDURE — 80048 BASIC METABOLIC PNL TOTAL CA: CPT | Performed by: INTERNAL MEDICINE

## 2018-03-03 PROCEDURE — 74011000258 HC RX REV CODE- 258: Performed by: NURSE PRACTITIONER

## 2018-03-03 PROCEDURE — 74011250636 HC RX REV CODE- 250/636: Performed by: NURSE PRACTITIONER

## 2018-03-03 PROCEDURE — 85025 COMPLETE CBC W/AUTO DIFF WBC: CPT | Performed by: NURSE PRACTITIONER

## 2018-03-03 PROCEDURE — 74011250637 HC RX REV CODE- 250/637: Performed by: ORTHOPAEDIC SURGERY

## 2018-03-03 PROCEDURE — 97530 THERAPEUTIC ACTIVITIES: CPT

## 2018-03-03 PROCEDURE — 97150 GROUP THERAPEUTIC PROCEDURES: CPT

## 2018-03-03 PROCEDURE — 74011250637 HC RX REV CODE- 250/637: Performed by: INTERNAL MEDICINE

## 2018-03-03 PROCEDURE — 77010033678 HC OXYGEN DAILY

## 2018-03-03 PROCEDURE — 74011250637 HC RX REV CODE- 250/637: Performed by: NURSE PRACTITIONER

## 2018-03-03 PROCEDURE — 65270000029 HC RM PRIVATE

## 2018-03-03 RX ADMIN — Medication 10 ML: at 17:32

## 2018-03-03 RX ADMIN — LISINOPRIL 40 MG: 20 TABLET ORAL at 08:31

## 2018-03-03 RX ADMIN — DOCUSATE SODIUM 100 MG: 100 CAPSULE, LIQUID FILLED ORAL at 17:32

## 2018-03-03 RX ADMIN — AMIODARONE HYDROCHLORIDE 200 MG: 200 TABLET ORAL at 08:31

## 2018-03-03 RX ADMIN — APIXABAN 2.5 MG: 2.5 TABLET, FILM COATED ORAL at 17:32

## 2018-03-03 RX ADMIN — CALCIUM CARBONATE 500 MG (1,250 MG)-VITAMIN D3 200 UNIT TABLET 1 TABLET: at 17:32

## 2018-03-03 RX ADMIN — CEFAZOLIN SODIUM 1 G: 1 INJECTION, POWDER, FOR SOLUTION INTRAMUSCULAR; INTRAVENOUS at 04:00

## 2018-03-03 RX ADMIN — Medication 1 AMPULE: at 21:42

## 2018-03-03 RX ADMIN — APIXABAN 2.5 MG: 2.5 TABLET, FILM COATED ORAL at 08:30

## 2018-03-03 RX ADMIN — METOPROLOL TARTRATE 50 MG: 50 TABLET ORAL at 08:31

## 2018-03-03 RX ADMIN — ACETAMINOPHEN 650 MG: 325 TABLET ORAL at 06:39

## 2018-03-03 RX ADMIN — PRAVASTATIN SODIUM 40 MG: 20 TABLET ORAL at 08:31

## 2018-03-03 RX ADMIN — LEVOTHYROXINE SODIUM 50 MCG: 50 TABLET ORAL at 06:39

## 2018-03-03 RX ADMIN — CALCIUM CARBONATE 500 MG (1,250 MG)-VITAMIN D3 200 UNIT TABLET 1 TABLET: at 13:44

## 2018-03-03 RX ADMIN — CALCIUM CARBONATE 500 MG (1,250 MG)-VITAMIN D3 200 UNIT TABLET 1 TABLET: at 08:31

## 2018-03-03 RX ADMIN — DOCUSATE SODIUM 100 MG: 100 CAPSULE, LIQUID FILLED ORAL at 08:31

## 2018-03-03 RX ADMIN — AMLODIPINE BESYLATE 10 MG: 10 TABLET ORAL at 08:31

## 2018-03-03 RX ADMIN — Medication 10 ML: at 21:41

## 2018-03-03 RX ADMIN — ACETAMINOPHEN 650 MG: 325 TABLET ORAL at 13:44

## 2018-03-03 RX ADMIN — ACETAMINOPHEN 650 MG: 325 TABLET ORAL at 21:41

## 2018-03-03 RX ADMIN — Medication 1 AMPULE: at 08:32

## 2018-03-03 RX ADMIN — ESCITALOPRAM OXALATE 5 MG: 10 TABLET ORAL at 08:31

## 2018-03-03 NOTE — PROGRESS NOTES
Problem: Mobility Impaired (Adult and Pediatric)  Goal: *Acute Goals and Plan of Care (Insert Text)  STG:  (1.)Ms. Haroon Lee will move from supine to sit and sit to supine , scoot up and down and roll side to side with MODERATE ASSIST within 3 treatment day(s). (2.)Ms. Haroon Lee will transfer from bed to chair and chair to bed with MAXIMAL ASSIST using the least restrictive device within 3 treatment day(s). (3.)Ms. Haroon Lee will ambulate with MAXIMAL ASSIST for 15 feet with the least restrictive device within 3 treatment day(s). (4.)Ms. Haroon Lee will participate in therapeutic activity/exerices x 12 minutes for increased strength within 3 days. LTG:  (1.)Ms. Haroon Lee will move from supine to sit and sit to supine , scoot up and down and roll side to side in bed with MINIMAL ASSIST within 7 treatment day(s). (2.)Ms. Haroon Lee will transfer from bed to chair and chair to bed with MODERATE ASSIST using the least restrictive device within 7 treatment day(s). (3.)Ms. Haroon Lee will ambulate with MODERATE ASSIST for 50 feet with the least restrictive device within 7 treatment day(s). (4.)Ms. Haroon Lee will participate in therapeutic activity/exerices x 23 minutes for increased strength within 7 days.     ________________________________________________________________________________________________      PHYSICAL THERAPY: Daily Note, Treatment Day: 2nd, AM 3/3/2018  INPATIENT: Hospital Day: 3  [place] / Product Type: Medicare /    R ANTIONE WBAT     NAME/AGE/GENDER: Ap Lee is a 80 y.o. female   PRIMARY DIAGNOSIS: Hip fracture ([address]  fractured right hip Closed fracture of right hip (HCC) Closed fracture of right hip (HCC)  Procedure(s) (LRB):  RIGHT HIP PERCUTANEOUS PINNING CANNULATED SCREWS (Right)  1 Day Post-Op  ICD-10: Treatment Diagnosis:   · Generalized Muscle Weakness (M62.81)  · Difficulty in walking, Not elsewhere classified (R26.2)  · Other abnormalities of gait and mobility (R26.89)  · History of falling (Z91.81)   Precaution/Allergies:  Review of patient's allergies indicates no known allergies. ASSESSMENT:     Ms. Benito De Dios is a 80 y.o. female in the hospital for the above who was supine in bed upon arrival.  Per chart pt came from [facility] after falling. Pt presents to PT with weakness and decreased AROM in B LEs. She follows commands and participates in group activity but speaks very little. Did answer yes/no questions. Pt required max assist x 2 for bed mobility and has impaired sitting balance with increased R lean. Pt presents like she has a history of CVA with R side affected and increased R UE tone observed. Pt required max A x 2 to take some steps over to the bedside chair. Pt was taken to group therapy in the gym on 7th floor where she was able to participate in group activities including LE ex as listed below to improve mobility, strength and balance . Pt could benefit from skilled PT to address earlier deficits. This section established at most recent assessment   PROBLEM LIST (Impairments causing functional limitations):  1. Decreased Strength  2. Decreased ADL/Functional Activities  3. Decreased Transfer Abilities  4. Decreased Ambulation Ability/Technique  5. Decreased Balance  6. Decreased Cognition   INTERVENTIONS PLANNED: (Benefits and precautions of physical therapy have been discussed with the patient.)  1. Balance Exercise  2. Bed Mobility  3. Family Education  4. Gait Training  5. Therapeutic Activites  6. Therapeutic Exercise/Strengthening  7. Transfer Training  8. Group Therapy     TREATMENT PLAN: Frequency/Duration: twice daily for duration of hospital stay  Rehabilitation Potential For Stated Goals: [place] [facility] REHABILITATION/EQUIPMENT: (at time of discharge pending progress): Due to the probability of continued deficits (see above) this patient will likely need continued skilled physical therapy after discharge.   Equipment:  None at this time              HISTORY:   History of Present Injury/Illness (Reason for Referral):  S/P RIGHT HIP PERCUTANEOUS PINNING CANNULATED SCREWS (Right)  Past Medical History/Comorbidities:   Ms. Stephy Cobb  has a past medical history of Arrhythmia; Dementia; GERD (gastroesophageal reflux disease); Hyperlipidemia; Hypertension; PVD (peripheral vascular disease) ([facility] [address] Stroke [facility]); and Thyroid disease. Ms. Stephy Cobb  has a past surgical history that includes hx other surgical.  Social History/Living Environment:   Home Environment: [address] [place] [address] Name: [facility]  # Steps to Enter: 0  One/Two Story Residence: One story  Living Alone: No  Support Systems: Skilled nursing facility  Patient Expects to be Discharged to[de-identified] Rehabilitation facility  Current DME Used/Available at Home: None  Prior Level of Function/Work/Activity:  Lives in a SNF per chart. Number of Personal Factors/Comorbidities that affect the Plan of Care: 1-2: MODERATE COMPLEXITY   EXAMINATION:   Most Recent Physical Functioning:   Gross Assessment:                  Posture:     Balance:  Sitting: Impaired  Sitting - Static: Fair (occasional)  Sitting - Dynamic: Fair (occasional) (-)  Standing - Static: Poor  Standing - Dynamic : Poor Bed Mobility:  Supine to Sit: Maximum assistance;Assist x2  Wheelchair Mobility:     Transfers:  Sit to Stand: Maximum assistance;Assist x2  Stand to Sit: Maximum assistance;Assist x2  Gait:            Body Structures Involved:  1. Nerves  2. Bones  3. Joints  4. Muscles Body Functions Affected:  1. Mental  2. Neuromusculoskeletal  3. Movement Related Activities and Participation Affected:  1. Learning and Applying Knowledge  2. General Tasks and Demands  3. Mobility  4. Self Care  5. Domestic Life  6.  Community, Social and [place]   Number of elements that affect the Plan of Care: 4+: HIGH COMPLEXITY   CLINICAL PRESENTATION:   Presentation: Evolving clinical presentation with changing clinical characteristics: MODERATE COMPLEXITY   CLINICAL DECISION MAKIN[address] [place] [id number] AM-PAC 6 Clicks   Basic Mobility Inpatient Short Form  How much difficulty does the patient currently have. .. Unable A Lot A Little None   1. Turning over in bed (including adjusting bedclothes, sheets and blankets)? [] 1   [] 2   [x] 3   [] 4   2. Sitting down on and standing up from a chair with arms ( e.g., wheelchair, bedside commode, etc.)   [] 1   [x] 2   [] 3   [] 4   3. Moving from lying on back to sitting on the side of the bed? [] 1   [x] 2   [] 3   [] 4   How much help from another person does the patient currently need. .. Total A Lot A Little None   4. Moving to and from a bed to a chair (including a wheelchair)? [x] 1   [] 2   [] 3   [] 4   5. Need to walk in hospital room? [x] 1   [] 2   [] 3   [] 4   6. Climbing 3-5 steps with a railing? [x] 1   [] 2   [] 3   [] 4   © 2007, Trustees of [address] [place] [id number], under license to [employer]. All rights reserved      Score:  Initial: 10 Most Recent: X (Date: -- )    Interpretation of Tool:  Represents activities that are increasingly more difficult (i.e. Bed mobility, Transfers, Gait). Score 24 23 22-20 19-15 14-10 9-7 6     Modifier CH CI CJ CK CL CM CN      ? Mobility - Walking and Moving Around:     - CURRENT STATUS: CL - 60%-79% impaired, limited or restricted    - GOAL STATUS: CK - 40%-59% impaired, limited or restricted    - D/C STATUS:  ---------------To be determined---------------  [place] / Product Type: Medicare /      Medical Necessity:     · Patient demonstrates fair rehab potential due to higher previous functional level. Reason for Services/Other Comments:  · Patient continues to require skilled intervention due to decreased functional mobility and balance.    Use of outcome tool(s) and clinical judgement create a POC that gives a: Questionable prediction of patient's progress: MODERATE COMPLEXITY            TREATMENT:   (In addition to Assessment/Re-Assessment sessions the following treatments were rendered)   Pre-treatment Symptoms/Complaints:  Nonverbal mostly  Pain: Initial:   Pain Intensity 1: 0  Post Session:  0/10     Therapeutic Activity: (    15 min): Therapeutic activities including bed mobility, gt on level surface and chair transfers to improve mobility, strength and balance. Required max   to promote dynamic balance in standing. Group Therapeutic Exercise: (  20 min):  Exercises per grid below to improve mobility, strength and balance. Required mod visual, verbal and tactile cues to promote proper body alignment, promote proper body posture and promote proper body mechanics. Date:  3/3/18 Date:   Date:     Activity/Exercise Seated Parameters Parameters Parameters   Heel raises X 10 B     Toe raises X 10 B     LAQ's X 10 B     Hip Flex X 10 B     Hip ABD X 10 B           Shld flex X 10 B, AA R     Shld horizontal ABD/ADD X 10 B, AA R     Elbow flex X 10 B, AA R     Elbow ext X 10 B, AA R               Braces/Orthotics/Lines/Etc:   · mantilla catheter  · O2 Device: Nasal cannula  Treatment/Session Assessment:    · Response to Treatment:  Poorly given dementia. · Interdisciplinary Collaboration:   o Physical Therapy Assistant  o Registered Nurse  o Rehabilitation Attendant  · After treatment position/precautions:   o Up in chair  o Bed alarm/tab alert on  o Bed/Chair-wheels locked  o Call light within reach   · Compliance with Program/Exercises: Will assess as treatment progresses. · Recommendations/Intent for next treatment session: \"Next visit will focus on advancements to more challenging activities and reduction in assistance provided\".   Total Treatment Duration:  PT Patient Time In/Time Out  Time In: 0900 (1015)  Time Out: 0915 (1035)    Marga Downing PTA

## 2018-03-03 NOTE — PROGRESS NOTES
Rounded with patient's nurse and encouraged them to use the blue phone if they needed it.     Thank you,      Levi Murillo  CERTIFIED HEALTHCARE    [place]  [phone]  [email or web address]

## 2018-03-03 NOTE — PROGRESS NOTES
Problem: Mobility Impaired (Adult and Pediatric)  Goal: *Acute Goals and Plan of Care (Insert Text)  STG:  (1.)Ms. Deb Payan will move from supine to sit and sit to supine , scoot up and down and roll side to side with MODERATE ASSIST within 3 treatment day(s). (2.)Ms. Deb Payan will transfer from bed to chair and chair to bed with MAXIMAL ASSIST using the least restrictive device within 3 treatment day(s). (3.)Ms. Deb Payan will ambulate with MAXIMAL ASSIST for 15 feet with the least restrictive device within 3 treatment day(s). (4.)Ms. Deb Payan will participate in therapeutic activity/exerices x 12 minutes for increased strength within 3 days. LTG:  (1.)Ms. Deb Payan will move from supine to sit and sit to supine , scoot up and down and roll side to side in bed with MINIMAL ASSIST within 7 treatment day(s). (2.)Ms. Deb Payan will transfer from bed to chair and chair to bed with MODERATE ASSIST using the least restrictive device within 7 treatment day(s). (3.)Ms. Deb Payan will ambulate with MODERATE ASSIST for 50 feet with the least restrictive device within 7 treatment day(s). (4.)Ms. Deb Payan will participate in therapeutic activity/exerices x 23 minutes for increased strength within 7 days.     ________________________________________________________________________________________________      PHYSICAL THERAPY: Daily Note, Treatment Day: 2nd, PM 3/3/2018  INPATIENT: Hospital Day: 3  [place] / Product Type: Medicare /    R LE WBAT     NAME/AGE/GENDER: Ap Payan is a 80 y.o. female   PRIMARY DIAGNOSIS: Hip fracture ([facility] [address]  fractured right hip Closed fracture of right hip (HCC) Closed fracture of right hip (HCC)  Procedure(s) (LRB):  RIGHT HIP PERCUTANEOUS PINNING CANNULATED SCREWS (Right)  1 Day Post-Op  ICD-10: Treatment Diagnosis:   · Generalized Muscle Weakness (M62.81)  · Difficulty in walking, Not elsewhere classified (R26.2)  · Other abnormalities of gait and mobility (R26.89)  · History of falling (Z91.81)   Precaution/Allergies:  Review of patient's allergies indicates no known allergies. ASSESSMENT:     Ms. Vadim Rosen is a 80 y.o. female in the hospital for the above. Per chart pt came from [facility] SNF after falling. Pt presents to PT with weakness and decreased AROM in B LEs. Pt presents like she has a history of CVA with R side affected and increased R UE tone observed. She follows commands and participates in group activity but speaks very little. Did answer yes/no questions. Up in chair from AM treat. Attempted sit to stand x 3 without success. SPT with total assist to sit to EOB. Sit to supine and bed mobility with max assist.  Pt could benefit from skilled PT to address earlier deficits. This section established at most recent assessment   PROBLEM LIST (Impairments causing functional limitations):  1. Decreased Strength  2. Decreased ADL/Functional Activities  3. Decreased Transfer Abilities  4. Decreased Ambulation Ability/Technique  5. Decreased Balance  6. Decreased Cognition   INTERVENTIONS PLANNED: (Benefits and precautions of physical therapy have been discussed with the patient.)  1. Balance Exercise  2. Bed Mobility  3. Family Education  4. Gait Training  5. Therapeutic Activites  6. Therapeutic Exercise/Strengthening  7. Transfer Training  8. Group Therapy     TREATMENT PLAN: Frequency/Duration: twice daily for duration of hospital stay  Rehabilitation Potential For Stated Goals: Carlene Cerrato REHABILITATION/EQUIPMENT: (at time of discharge pending progress): Due to the probability of continued deficits (see above) this patient will likely need continued skilled physical therapy after discharge.   Equipment:    None at this time              HISTORY:   History of Present Injury/Illness (Reason for Referral):  S/P RIGHT HIP PERCUTANEOUS PINNING CANNULATED SCREWS (Right)  Past Medical History/Comorbidities:   Ms. Vadim Rosen  has a past medical history of Arrhythmia; Dementia; GERD (gastroesophageal reflux disease); Hyperlipidemia; Hypertension; PVD (peripheral vascular disease) ([facility] [address] Stroke [facility]); and Thyroid disease. Ms. Ruba Nair  has a past surgical history that includes hx other surgical.  Social History/Living Environment:   Home Environment: [address] [place] [address] Name: [facility]  # Steps to Enter: 0  One/Two Story Residence: One story  Living Alone: No  Support Systems: Skilled nursing facility  Patient Expects to be Discharged to[de-identified] Rehabilitation facility  Current DME Used/Available at Home: None  Prior Level of Function/Work/Activity:  Lives in a SNF per chart. Number of Personal Factors/Comorbidities that affect the Plan of Care: 1-2: MODERATE COMPLEXITY   EXAMINATION:   Most Recent Physical Functioning:   Gross Assessment:                  Posture:     Balance:  Sitting: Impaired  Sitting - Static: Fair (occasional)  Sitting - Dynamic: Fair (occasional) (-)  Standing - Static: Poor  Standing - Dynamic : Poor Bed Mobility:  Supine to Sit: Maximum assistance;Assist x2  Sit to Supine: Maximum assistance  Wheelchair Mobility:     Transfers:  Sit to Stand: Total assistance  Stand to Sit: Total assistance  Gait:            Body Structures Involved:  1. Nerves  2. Bones  3. Joints  4. Muscles Body Functions Affected:  1. Mental  2. Neuromusculoskeletal  3. Movement Related Activities and Participation Affected:  1. Learning and Applying Knowledge  2. General Tasks and Demands  3. Mobility  4. Self Care  5. Domestic Life  6. Community, Social and [place]   Number of elements that affect the Plan of Care: 4+: HIGH COMPLEXITY   CLINICAL PRESENTATION:   Presentation: Evolving clinical presentation with changing clinical characteristics: MODERATE COMPLEXITY   CLINICAL DECISION MAKIN [facility] Inpatient Short Form  How much difficulty does the patient currently have. ..  Unable A Lot A Little None   1. Turning over in bed (including adjusting bedclothes, sheets and blankets)? [] 1   [] 2   [x] 3   [] 4   2. Sitting down on and standing up from a chair with arms ( e.g., wheelchair, bedside commode, etc.)   [] 1   [x] 2   [] 3   [] 4   3. Moving from lying on back to sitting on the side of the bed? [] 1   [x] 2   [] 3   [] 4   How much help from another person does the patient currently need. .. Total A Lot A Little None   4. Moving to and from a bed to a chair (including a wheelchair)? [x] 1   [] 2   [] 3   [] 4   5. Need to walk in hospital room? [x] 1   [] 2   [] 3   [] 4   6. Climbing 3-5 steps with a railing? [x] 1   [] 2   [] 3   [] 4   © 2007, [employer] [id number], under license to [employer]. All rights reserved      Score:  Initial: 10 Most Recent: X (Date: -- )    Interpretation of Tool:  Represents activities that are increasingly more difficult (i.e. Bed mobility, Transfers, Gait). Score 24 23 22-20 19-15 14-10 9-7 6     Modifier CH CI CJ CK CL CM CN      ? Mobility - Walking and Moving Around:     - CURRENT STATUS: CL - 60%-79% impaired, limited or restricted    - GOAL STATUS: CK - 40%-59% impaired, limited or restricted    - D/C STATUS:  ---------------To be determined---------------  [place] / Product Type: Medicare /      Medical Necessity:     · Patient demonstrates fair rehab potential due to higher previous functional level. Reason for Services/Other Comments:  · Patient continues to require skilled intervention due to decreased functional mobility and balance.    Use of outcome tool(s) and clinical judgement create a POC that gives a: Questionable prediction of patient's progress: MODERATE COMPLEXITY            TREATMENT:   (In addition to Assessment/Re-Assessment sessions the following treatments were rendered)   Pre-treatment Symptoms/Complaints:  Nonverbal mostly  Pain: Initial:   Pain Intensity 1: 0  Post Session:  0/10     Therapeutic Activity: (    10 min): Therapeutic activities including bed mobility and initiating chair transfers to improve mobility, strength and balance. Required total   to promote dynamic balance in standing. Group Therapeutic Exercise: (   min):  Exercises per grid below to improve mobility, strength and balance. Required mod visual, verbal and tactile cues to promote proper body alignment, promote proper body posture and promote proper body mechanics. Date:  3/3/18 AM Date:   Date:     Activity/Exercise Seated Parameters Parameters Parameters   Heel raises X 10 B     Toe raises X 10 B     LAQ's X 10 B     Hip Flex X 10 B     Hip ABD X 10 B           Shld flex X 10 B, AA R     Shld horizontal ABD/ADD X 10 B, AA R     Elbow flex X 10 B, AA R     Elbow ext X 10 B, AA R               Braces/Orthotics/Lines/Etc:   · mantilla catheter  Treatment/Session Assessment:    · Response to Treatment:  Good. · Interdisciplinary Collaboration:   o Physical Therapy Assistant  o Registered Nurse  · After treatment position/precautions:   o Supine in bed  o Bed alarm/tab alert on  o Bed/Chair-wheels locked  o Call light within reach   · Compliance with Program/Exercises: Will assess as treatment progresses. · Recommendations/Intent for next treatment session: \"Next visit will focus on advancements to more challenging activities and reduction in assistance provided\".   Total Treatment Duration:  PT Patient Time In/Time Out  Time In: 1436  Time Out: [address] [place] Yessenia, PTA

## 2018-03-03 NOTE — PROGRESS NOTES
Problem: Falls - Risk of  Goal: *Absence of Falls  Document Calvin Fall Risk and appropriate interventions in the flowsheet.    Outcome: Progressing Towards Goal  Fall Risk Interventions:  Mobility Interventions: Bed/chair exit alarm, Communicate number of staff needed for ambulation/transfer, PT Consult for mobility concerns    Mentation Interventions: Bed/chair exit alarm, Adequate sleep, hydration, pain control, More frequent rounding    Medication Interventions: Bed/chair exit alarm, Evaluate medications/consider consulting pharmacy, Teach patient to arise slowly    Elimination Interventions: Bed/chair exit alarm, Call light in reach, Patient to call for help with toileting needs    History of Falls Interventions: Bed/chair exit alarm, Door open when patient unattended, Room close to nurse's station

## 2018-03-03 NOTE — PROGRESS NOTES
Problem: Self Care Deficits Care Plan (Adult)  Goal: *Acute Goals and Plan of Care (Insert Text)  1. Patient will feed self entire meal with setup. 2. Patient will complete functional transfers with moderate assistance while maintaining WBAT status in RLE and with adaptive equipment as needed. 3. Patient will complete lower body bathing and dressing with moderate assistance and adaptive equipment as needed. 4. Patient will complete toileting and toilet transfer with moderate assistance. 5. Patient will tolerate 20 minutes of OT treatment with as needed rest breaks to increase activity tolerance for ADLs. 6. Patient will participate in at least 15 minutes of BUE therapeutic exercises to strengthen BUE for functional transfers. Timeframe: 7 visits       OCCUPATIONAL THERAPY: Initial Assessment 3/3/2018  INPATIENT: Hospital Day: 3  [place] / Product Type: Medicare /      NAME/AGE/GENDER: Ap Cary is a 80 y.o. female   PRIMARY DIAGNOSIS:  Hip fracture ([facility] [address]  fractured right hip Closed fracture of right hip (HCC) Closed fracture of right hip (HCC)  Procedure(s) (LRB):  RIGHT HIP PERCUTANEOUS PINNING CANNULATED SCREWS (Right)  1 Day Post-Op  ICD-10: Treatment Diagnosis:    · Pain in Right Hip (M25.551)  · Stiffness of Right Hip, Not elsewhere classified (M25.651)  · Repeated Falls (R29.6)  · History of falling (Z91.81)   Precautions/Allergies:    WBAT RLE   Fall Precautions  Review of patient's allergies indicates no known allergies. ASSESSMENT:     Ms. Rachel Cary is an 80year old female admitted from [facility] after a fall, now s/p above procedure and WBAT in RLE. Per chart, patient has history of dementia and CVA and is non-ambulatory at baseline. Assume she was getting assistance with ADLs from facility, although no family present to confirm. Patient able to communicate and answer questions appropriately. Patient agreeable to OT evaluation.  Pleasantly confused. Oriented to person. Disoriented to place, situation. BUE assessment reveals RUE with decreased ROM, strength. Patient reports \"this one (RUE) is no good\" and it has been that way \"for long time. \" R  strength decreased compared to L . Also with impaired balance and lean to L side. Patient demonstrates ability to bring drink to mouth using LUE only. Patient is currently requiring total assistance for all functional mobility and maximal- total assistance for all lower body ADLs. She would benefit from continued occupational therapy to increase independence and safety. Will follow. This section established at most recent assessment   PROBLEM LIST (Impairments causing functional limitations):  1. Decreased Strength  2. Decreased ADL/Functional Activities  3. Decreased Transfer Abilities  4. Decreased Ambulation Ability/Technique  5. Decreased Balance  6. Increased Pain  7. Decreased Activity Tolerance  8. Decreased Flexibility/Joint Mobility  9. Decreased Knowledge of Precautions   INTERVENTIONS PLANNED: (Benefits and precautions of occupational therapy have been discussed with the patient.)  1. Activities of daily living training  2. Adaptive equipment training  3. Group therapy  4. Therapeutic activity  5. Therapeutic exercise     TREATMENT PLAN: Frequency/Duration: Follow patient 6x/ week to address above goals. Rehabilitation Potential For Stated Goals: Fair     RECOMMENDED REHABILITATION/EQUIPMENT: (at time of discharge pending progress): Due to the probability of continued deficits (see above) this patient will likely need continued skilled occupational therapy after discharge. Equipment:    None at this time              OCCUPATIONAL PROFILE AND HISTORY:   History of Present Injury/Illness (Reason for Referral):  Per H&P, \"Pt is an 79 y/o F who presented from [facility] after being diagnosed with R fem neck fracture there.   She suffered a fall on 2/19/18 which is believed to be the etiology per notes. She is not very talkative but denies complaints except for R hip pain, though controlled with pain meds. She denies CP, SOB, palpitations, fevers, chills, n/v, or other complaints NOS. Does not know her medical or surgical history. Per report she has dementia. DNR papers on chart from SNF. No family present. \"     Past Medical History/Comorbidities:   Ms. Ken Govea  has a past medical history of Arrhythmia; Dementia; GERD (gastroesophageal reflux disease); Hyperlipidemia; Hypertension; PVD (peripheral vascular disease) ([facility] [address] Stroke [facility]); and Thyroid disease. Ms. Ken Govea  has a past surgical history that includes hx other surgical.  Social History/Living Environment:   Home Environment: [address] [place] [address] Name: [facility]  # Steps to Enter: 0  One/Two Story Residence: One story  Living Alone: No  Support Systems: Skilled nursing facility  Patient Expects to be Discharged to[de-identified] Rehabilitation facility  Current DME Used/Available at Home: None*  Prior Level of Function/Work/Activity:  Per chart, patient is long term resident at [facility] and is non ambulatory. No family present to confirm. Assume assistance with ADLs. Dominant Side:         LEFT   Number of Personal Factors/Comorbidities that affect the Plan of Care: Expanded review of therapy/medical records (1-2):  MODERATE COMPLEXITY   ASSESSMENT OF OCCUPATIONAL PERFORMANCE[de-identified]   Activities of Daily Living:           Basic ADLs (From Assessment) Complex ADLs (From Assessment)   Basic ADL  Feeding: Minimum assistance  Oral Facial Hygiene/Grooming: Minimum assistance  Bathing: Maximum assistance  Upper Body Dressing: Minimum assistance  Lower Body Dressing: Maximum assistance  Toileting: Maximum assistance Instrumental ADL  Meal Preparation: Total assistance  Homemaking:  Total assistance  Medication Management: Total assistance  Financial Management: Total assistance   Grooming/Bathing/Dressing Activities of Daily Living Cognitive Retraining  Safety/Judgement: Decreased awareness of environment; Fall prevention;Decreased insight into deficits     Feeding  Drink to Mouth: Supervision/set-up (L hand only)                 Bed/Mat Mobility  Supine to Sit: Maximum assistance;Assist x2  Sit to Stand: Maximum assistance;Assist x2       Most Recent Physical Functioning:   Gross Assessment:  AROM: Generally decreased, functional (RUE weaker than LUE)  Strength: Generally decreased, functional (R  less than L )               Posture:     Balance:  Sitting: Impaired  Sitting - Static: Fair (occasional)  Sitting - Dynamic: Fair (occasional) (-)  Standing - Static: Poor  Standing - Dynamic : Poor Bed Mobility:  Supine to Sit: Maximum assistance;Assist x2  Wheelchair Mobility:     Transfers:  Sit to Stand: Maximum assistance;Assist x2  Stand to Sit: Maximum assistance;Assist x2                Patient Vitals for the past 6 hrs:   BP BP Patient Position SpO2 Pulse   03/03/18 1131 134/67 At rest 97 % 66   03/03/18 1142 130/82 At rest 96 % 93   03/03/18 1452 124/65 At rest 97 % 64       Mental Status  Neurologic State: Alert, Confused  Orientation Level: Oriented to person, Disoriented to place, Disoriented to situation  Cognition: Follows commands  Perception: Cues to maintain midline in sitting  Perseveration: No perseveration noted  Safety/Judgement: Decreased awareness of environment, Fall prevention, Decreased insight into deficits                          Physical Skills Involved:  1. Range of Motion  2. Balance  3. Strength  4. Activity Tolerance  5. Pain (acute) Cognitive Skills Affected (resulting in the inability to perform in a timely and safe manner):  1. Perception  2. Executive Function  3. Long Term Memory Psychosocial Skills Affected:  1. Habits/Routines  2. Environmental Adaptation  3. Self-Awareness  4.  Social Roles   Number of elements that affect the Plan of Care: 5+:  HIGH COMPLEXITY   CLINICAL DECISION MAKING:   [facility] 6 Clicks   Daily Activity Inpatient Short Form  How much help from another person does the patient currently need. .. Total A Lot A Little None   1. Putting on and taking off regular lower body clothing? [] 1   [x] 2   [] 3   [] 4   2. Bathing (including washing, rinsing, drying)? [] 1   [x] 2   [] 3   [] 4   3. Toileting, which includes using toilet, bedpan or urinal?   [] 1   [x] 2   [] 3   [] 4   4. Putting on and taking off regular upper body clothing? [] 1   [x] 2   [] 3   [] 4   5. Taking care of personal grooming such as brushing teeth? [] 1   [] 2   [x] 3   [] 4   6. Eating meals? [] 1   [] 2   [x] 3   [] 4   © 2007, Trustees of [address] [place] [id number], under license to [employer]. All rights reserved      Score:  Initial: 14 Most Recent: X (Date: -- )    Interpretation of Tool:  Represents activities that are increasingly more difficult (i.e. Bed mobility, Transfers, Gait). Score 24 23 22-20 19-15 14-10 9-7 6     Modifier CH CI CJ CK CL CM CN      ? Self Care:     - CURRENT STATUS: CL - 60%-79% impaired, limited or restricted    - GOAL STATUS: CJ - 20%-39% impaired, limited or restricted    - D/C STATUS:  ---------------To be determined---------------  [place] / Product Type: Medicare /      Medical Necessity:     · Patient demonstrates good rehab potential due to higher previous functional level. Reason for Services/Other Comments:  · Patient continues to require present interventions due to patient's inability to care for self at pre-op level of function.    Use of outcome tool(s) and clinical judgement create a POC that gives a: HIGH COMPLEXITY         TREATMENT:   (In addition to Assessment/Re-Assessment sessions the following treatments were rendered)     Pre-treatment Symptoms/Complaints:  None  Pain: Initial:   Pain Intensity 1: 0  Post Session:  None      Assessment/Reassessment only, no treatment provided today    Braces/Orthotics/Lines/Etc:   · IV  · O2 Device: Nasal cannula  Treatment/Session Assessment:    · Response to Treatment:  Tolerated well  · Interdisciplinary Collaboration:   o Occupational Therapist  o Registered Nurse  · After treatment position/precautions:   o Up in chair  o Bed alarm/tab alert on  o Bed/Chair-wheels locked  o Call light within reach  o RN notified  o Nurse at bedside   · Compliance with Program/Exercises: compliant all of the time. · Recommendations/Intent for next treatment session: \"Next visit will focus on advancements to more challenging activities and reduction in assistance provided\".   Total Treatment Duration:  OT Patient Time In/Time Out  Time In: 1000  Time Out: KAITLYNN Katz/L

## 2018-03-03 NOTE — PROGRESS NOTES
Hospitalist Progress Note    Patient: Ap Cerna MRN: [id number]  SSN: [id number]    YOB: 1935  Age: 80 y.o. Sex: female      Admit Date: 3/1/2018    LOS: 2 days     Subjective:     From H&P: \"81 y/o F who presented from [facility] after being diagnosed with R fem neck fracture there. She suffered a fall on 2/19/18 which is believed to be the etiology per notes. She is not very talkative but denies complaints except for R hip pain, though controlled with pain meds. She denies CP, SOB, palpitations, fevers, chills, n/v, or other complaints NOS. Does not know her medical or surgical history. Per report she has dementia. DNR papers on chart from SNF. No family present. \"    3/2 - I saw the patient after surgery. She was still sleepy. Only shook her head to questions, so not sure if she understood. Staff has been trying to get in touch with family. 3/3 - Sitting in chair this AM. Feels good. Asking to go for a walk. Review of systems negative except stated above. Objective:     Visit Vitals    /52 (BP 1 Location: Left arm, BP Patient Position: At rest)    Pulse 70    Temp 98.8 °F (37.1 °C)    Resp 17    Wt 44.7 kg (98 lb 8 oz)    SpO2 96%    Breastfeeding No      Oxygen Therapy  O2 Sat (%): 96 % (03/03/18 0749)  Pulse via Oximetry: 61 beats per minute (03/02/18 1413)  O2 Device: Nasal cannula (03/02/18 1358)  O2 Flow Rate (L/min): 2 l/min (03/02/18 1358)      Intake and Output:     Intake/Output Summary (Last 24 hours) at 03/03/18 1131  Last data filed at 03/03/18 0649   Gross per 24 hour   Intake              500 ml   Output              855 ml   Net             -355 ml         Physical Exam:   GENERAL: Drowsy, cooperative, no distress, appears stated age  EYE: conjunctivae/corneas clear. PERRL. THROAT & NECK: normal and no erythema or exudates noted.    LUNG: clear to auscultation bilaterally  HEART: regular rate and rhythm, S1S2, no murmur, no JVD  ABDOMEN: soft, non-tender, non-distended. Bowel sounds normal.   EXTREMITIES:  No edema, 2+ pedal/radial pulses bilaterally  SKIN: no rash or abnormalities  NEUROLOGIC: Drowsy. Cranial nerves 2-12 grossly intact. Lab/Data Review:  Recent Results (from the past 24 hour(s))   PLEASE READ & DOCUMENT PPD TEST IN 24 HRS    Collection Time: 03/02/18  7:04 PM   Result Value Ref Range    PPD neg Negative    mm Induration 0 mm   CBC WITH AUTOMATED DIFF    Collection Time: 03/03/18  5:23 AM   Result Value Ref Range    WBC 7.3 4.3 - 11.1 K/uL    RBC 4.15 4.05 - 5.25 M/uL    HGB 12.3 11.7 - 15.4 g/dL    HCT 38.6 35.8 - 46.3 %    MCV 93.0 79.6 - 97.8 FL    MCH 29.6 26.1 - 32.9 PG    MCHC 31.9 31.4 - 35.0 g/dL    RDW 14.1 11.9 - 14.6 %    PLATELET 466 203 - 445 K/uL    MPV 12.9 10.8 - 14.1 FL    DF AUTOMATED      NEUTROPHILS 88 (H) 43 - 78 %    LYMPHOCYTES 9 (L) 13 - 44 %    MONOCYTES 3 (L) 4.0 - 12.0 %    EOSINOPHILS 0 (L) 0.5 - 7.8 %    BASOPHILS 0 0.0 - 2.0 %    IMMATURE GRANULOCYTES 0 0.0 - 5.0 %    ABS. NEUTROPHILS 6.5 1.7 - 8.2 K/UL    ABS. LYMPHOCYTES 0.6 0.5 - 4.6 K/UL    ABS. MONOCYTES 0.2 0.1 - 1.3 K/UL    ABS. EOSINOPHILS 0.0 0.0 - 0.8 K/UL    ABS. BASOPHILS 0.0 0.0 - 0.2 K/UL    ABS. IMM. GRANS. 0.0 0.0 - 0.5 K/UL   METABOLIC PANEL, BASIC    Collection Time: 03/03/18  5:23 AM   Result Value Ref Range    Sodium 148 (H) 136 - 145 mmol/L    Potassium 4.4 3.5 - 5.1 mmol/L    Chloride 111 (H) 98 - 107 mmol/L    CO2 28 21 - 32 mmol/L    Anion gap 9 7 - 16 mmol/L    Glucose 93 65 - 100 mg/dL    BUN 17 8 - 23 MG/DL    Creatinine 0.82 0.6 - 1.0 MG/DL    GFR est AA >60 >60 ml/min/1.73m2    GFR est non-AA >60 >60 ml/min/1.73m2    Calcium 8.6 8.3 - 10.4 MG/DL       Imaging:  Xr Hip Rt W Or Wo Pelv 2-3 Vws    Result Date: 3/1/2018  IMPRESSION: Impacted right femoral neck fracture. Xr Hip Rt During Or Proc    Result Date: 3/2/2018  Impression: Intraoperative fixation as above.      Xr Femur Rt 2 Vs    Result Date: 3/1/2018  IMPRESSION: Subcapital right femoral neck fracture. Xr Chest Port    Result Date: 3/1/2018  IMPRESSION: Possible tiny left pleural effusion. Cultures: All Micro Results     Procedure Component Value Units Date/Time    MSSA/MRSA SC BY PCR, NASAL SWAB [[id number]] Collected:  03/01/18 1802    Order Status:  Completed Specimen:  Swab Updated:  03/01/18 2044     Special Requests: NO SPECIAL REQUESTS        Culture result:         SA target not detected. A MRSA NEGATIVE, SA NEGATIVE test result does not preclude MRSA or SA nasal colonization. MSSA/MRSA SC BY PCR, NASAL SWAB [[id number]]     Order Status:  Sent Specimen:  Swab           Assessment/Plan:     Principal Problem:    Closed fracture of right hip ([address] (3/1/2018)  - S/P Intramedullary Pin  - Appears to be doing well  - PT/OT  - Pain control per ortho    Active Problems:    Dementia without behavioral disturbance (3/1/2018)  - Exelon patch  - Lexapro  - Hold Namenda      Atrial Fibrillation (3/2/2018)  - Stable  - Continue Amiodarone and Lopressor  - Restarted Eliquis this AM      Essential hypertension (3/1/2018)  - Stable  - Amlodipine  - Lisinopril  - Lopressor      Hyperlipidemia (3/2/2018)  - Continue Pravastain      DNR (do not resuscitate) (3/1/2018)  - Per SNF paperwork    Dispo - Discharge to rehab when appropriate    DIET NUTRITIONAL SUPPLEMENTS All Meals; Magic Cups  DIET NUTRITIONAL SUPPLEMENTS All Meals; Ensure Enlive  DIET REGULAR  DIET NUTRITIONAL SUPPLEMENTS All Meals;  Ensure Enlive    DVT Prophylaxis: Eliquis      Signed By: Jenna Bills DO     March 3, 2018

## 2018-03-03 NOTE — PROGRESS NOTES
ORTHO    Sub:  Pt complains of expected pain. Obj:    Vitals:    03/02/18 1939 03/02/18 2319 03/03/18 0333 03/03/18 0749   BP: 128/63 132/66 127/56 153/52   Pulse: 61 61 63 70   Resp: 14 16 16 17   Temp: 97.7 °F (36.5 °C) 98 °F (36.7 °C) 97.8 °F (36.6 °C) 98.8 °F (37.1 °C)   SpO2: 100% 100% 100% 96%   Weight:             Recent Labs      03/03/18   0523  03/02/18   0525  03/01/18   1418   WBC  7.3  5.7  6.8   HGB  12.3  11.6*  13.6                 Dressing is dry and intact. Calves are soft and non tender. There is no redness or appearance of infection. N/V status is good. Assessment:   Post op Day 1 Percutaneous pinning of Rt hip fx    Plan:   Continue with Physical Therapy.               Marino Arrington MD

## 2018-03-04 LAB
ANION GAP SERPL CALC-SCNC: 7 MMOL/L (ref 7–16)
BASOPHILS # BLD: 0 K/UL (ref 0–0.2)
BASOPHILS NFR BLD: 0 % (ref 0–2)
BUN SERPL-MCNC: 18 MG/DL (ref 8–23)
CALCIUM SERPL-MCNC: 8.4 MG/DL (ref 8.3–10.4)
CHLORIDE SERPL-SCNC: 109 MMOL/L (ref 98–107)
CO2 SERPL-SCNC: 31 MMOL/L (ref 21–32)
CREAT SERPL-MCNC: 0.76 MG/DL (ref 0.6–1)
DIFFERENTIAL METHOD BLD: ABNORMAL
EOSINOPHIL # BLD: 0 K/UL (ref 0–0.8)
EOSINOPHIL NFR BLD: 0 % (ref 0.5–7.8)
ERYTHROCYTE [DISTWIDTH] IN BLOOD BY AUTOMATED COUNT: 14.1 % (ref 11.9–14.6)
GLUCOSE SERPL-MCNC: 85 MG/DL (ref 65–100)
HCT VFR BLD AUTO: 38.4 % (ref 35.8–46.3)
HGB BLD-MCNC: 12.5 G/DL (ref 11.7–15.4)
IMM GRANULOCYTES # BLD: 0 K/UL (ref 0–0.5)
IMM GRANULOCYTES NFR BLD AUTO: 0 % (ref 0–5)
LYMPHOCYTES # BLD: 1.2 K/UL (ref 0.5–4.6)
LYMPHOCYTES NFR BLD: 16 % (ref 13–44)
MCH RBC QN AUTO: 30.3 PG (ref 26.1–32.9)
MCHC RBC AUTO-ENTMCNC: 32.6 G/DL (ref 31.4–35)
MCV RBC AUTO: 93.2 FL (ref 79.6–97.8)
MM INDURATION POC: 0 MM (ref 0–5)
MONOCYTES # BLD: 0.6 K/UL (ref 0.1–1.3)
MONOCYTES NFR BLD: 8 % (ref 4–12)
NEUTS SEG # BLD: 5.3 K/UL (ref 1.7–8.2)
NEUTS SEG NFR BLD: 76 % (ref 43–78)
PLATELET # BLD AUTO: 286 K/UL (ref 150–450)
PMV BLD AUTO: 12.8 FL (ref 10.8–14.1)
POTASSIUM SERPL-SCNC: 3.7 MMOL/L (ref 3.5–5.1)
PPD POC: NEGATIVE NEGATIVE
RBC # BLD AUTO: 4.12 M/UL (ref 4.05–5.25)
SODIUM SERPL-SCNC: 147 MMOL/L (ref 136–145)
WBC # BLD AUTO: 7.1 K/UL (ref 4.3–11.1)

## 2018-03-04 PROCEDURE — 97535 SELF CARE MNGMENT TRAINING: CPT

## 2018-03-04 PROCEDURE — 65270000029 HC RM PRIVATE

## 2018-03-04 PROCEDURE — 85025 COMPLETE CBC W/AUTO DIFF WBC: CPT | Performed by: NURSE PRACTITIONER

## 2018-03-04 PROCEDURE — 80048 BASIC METABOLIC PNL TOTAL CA: CPT | Performed by: NURSE PRACTITIONER

## 2018-03-04 PROCEDURE — 74011250637 HC RX REV CODE- 250/637: Performed by: INTERNAL MEDICINE

## 2018-03-04 PROCEDURE — 97150 GROUP THERAPEUTIC PROCEDURES: CPT

## 2018-03-04 PROCEDURE — 74011250637 HC RX REV CODE- 250/637: Performed by: ORTHOPAEDIC SURGERY

## 2018-03-04 PROCEDURE — 36415 COLL VENOUS BLD VENIPUNCTURE: CPT | Performed by: NURSE PRACTITIONER

## 2018-03-04 PROCEDURE — 97530 THERAPEUTIC ACTIVITIES: CPT

## 2018-03-04 PROCEDURE — 74011250637 HC RX REV CODE- 250/637: Performed by: NURSE PRACTITIONER

## 2018-03-04 RX ADMIN — Medication 10 ML: at 05:22

## 2018-03-04 RX ADMIN — Medication 10 ML: at 20:54

## 2018-03-04 RX ADMIN — CALCIUM CARBONATE 500 MG (1,250 MG)-VITAMIN D3 200 UNIT TABLET 1 TABLET: at 09:02

## 2018-03-04 RX ADMIN — DOCUSATE SODIUM 100 MG: 100 CAPSULE, LIQUID FILLED ORAL at 09:03

## 2018-03-04 RX ADMIN — ACETAMINOPHEN 650 MG: 325 TABLET ORAL at 05:21

## 2018-03-04 RX ADMIN — OXYCODONE HYDROCHLORIDE 5 MG: 5 TABLET ORAL at 12:14

## 2018-03-04 RX ADMIN — APIXABAN 2.5 MG: 2.5 TABLET, FILM COATED ORAL at 18:30

## 2018-03-04 RX ADMIN — Medication 1 AMPULE: at 20:54

## 2018-03-04 RX ADMIN — CALCIUM CARBONATE 500 MG (1,250 MG)-VITAMIN D3 200 UNIT TABLET 1 TABLET: at 18:30

## 2018-03-04 RX ADMIN — APIXABAN 2.5 MG: 2.5 TABLET, FILM COATED ORAL at 09:03

## 2018-03-04 RX ADMIN — PRAVASTATIN SODIUM 40 MG: 20 TABLET ORAL at 09:02

## 2018-03-04 RX ADMIN — LEVOTHYROXINE SODIUM 50 MCG: 50 TABLET ORAL at 05:21

## 2018-03-04 RX ADMIN — Medication 1 AMPULE: at 09:01

## 2018-03-04 RX ADMIN — AMIODARONE HYDROCHLORIDE 200 MG: 200 TABLET ORAL at 09:03

## 2018-03-04 RX ADMIN — AMLODIPINE BESYLATE 10 MG: 10 TABLET ORAL at 09:02

## 2018-03-04 RX ADMIN — ESCITALOPRAM OXALATE 5 MG: 10 TABLET ORAL at 09:03

## 2018-03-04 RX ADMIN — ACETAMINOPHEN 650 MG: 325 TABLET ORAL at 20:54

## 2018-03-04 RX ADMIN — CALCIUM CARBONATE 500 MG (1,250 MG)-VITAMIN D3 200 UNIT TABLET 1 TABLET: at 12:14

## 2018-03-04 NOTE — PROGRESS NOTES
Problem: Self Care Deficits Care Plan (Adult)  Goal: *Acute Goals and Plan of Care (Insert Text)  1. Patient will feed self entire meal with setup. 2. Patient will complete functional transfers with moderate assistance while maintaining WBAT status in RLE and with adaptive equipment as needed. 3. Patient will complete lower body bathing and dressing with moderate assistance and adaptive equipment as needed. 4. Patient will complete toileting and toilet transfer with moderate assistance. 5. Patient will tolerate 20 minutes of OT treatment with as needed rest breaks to increase activity tolerance for ADLs. 6. Patient will participate in at least 15 minutes of BUE therapeutic exercises to strengthen BUE for functional transfers. Timeframe: 7 visits       OCCUPATIONAL THERAPY: Daily Note, Treatment Day: 1st and PM 3/4/2018  INPATIENT: Hospital Day: 4  [place] / [place] / Product Type: Medicare /      NAME/AGE/GENDER: Ap Andersen is a 80 y.o. female   PRIMARY DIAGNOSIS:  Hip fracture ([facility] [address]  fractured right hip Closed fracture of right hip (HCC) Closed fracture of right hip (HCC)  Procedure(s) (LRB):  RIGHT HIP PERCUTANEOUS PINNING CANNULATED SCREWS (Right)  2 Days Post-Op  ICD-10: Treatment Diagnosis:    · Pain in Right Hip (M25.551)  · Stiffness of Right Hip, Not elsewhere classified (M25.651)  · Repeated Falls (R29.6)  · History of falling (Z91.81)   Precautions/Allergies:    WBAT RLE   Fall Precautions  Review of patient's allergies indicates no known allergies. ASSESSMENT:   Ms. Kory Andersen was admitted for the above diagnosis. Pt presents sitting on bedside commode upon arrival. Pt required max assist x2 to stand. She is dependent for bowel hygiene at this time. Pt was transferred back to the recliner with max assist x2. Left with all needs in reach and RN in room. Will continue to benefit from skilled OT during stay.   This section established at most recent assessment PROBLEM LIST (Impairments causing functional limitations):  1. Decreased Strength  2. Decreased ADL/Functional Activities  3. Decreased Transfer Abilities  4. Decreased Ambulation Ability/Technique  5. Decreased Balance  6. Increased Pain  7. Decreased Activity Tolerance  8. Decreased Flexibility/Joint Mobility  9. Decreased Knowledge of Precautions   INTERVENTIONS PLANNED: (Benefits and precautions of occupational therapy have been discussed with the patient.)  1. Activities of daily living training  2. Adaptive equipment training  3. Group therapy  4. Therapeutic activity  5. Therapeutic exercise     TREATMENT PLAN: Frequency/Duration: Follow patient 6x/ week to address above goals. Rehabilitation Potential For Stated Goals: Fair     RECOMMENDED REHABILITATION/EQUIPMENT: (at time of discharge pending progress): Due to the probability of continued deficits (see above) this patient will likely need continued skilled occupational therapy after discharge. Equipment:    None at this time              OCCUPATIONAL PROFILE AND HISTORY:   History of Present Injury/Illness (Reason for Referral):  Per H&P, \"Pt is an 81 y/o F who presented from [facility] after being diagnosed with R fem neck fracture there. She suffered a fall on 2/19/18 which is believed to be the etiology per notes. She is not very talkative but denies complaints except for R hip pain, though controlled with pain meds. She denies CP, SOB, palpitations, fevers, chills, n/v, or other complaints NOS. Does not know her medical or surgical history. Per report she has dementia. DNR papers on chart from SNF. No family present. \"     Past Medical History/Comorbidities:   Ms. Abraham Sampson  has a past medical history of Arrhythmia; Dementia; GERD (gastroesophageal reflux disease); Hyperlipidemia; Hypertension; PVD (peripheral vascular disease) ([facility] [address] [facility] and Thyroid disease.   Ms. Abraham Sampson  has a past surgical history that includes hx other surgical.  Social History/Living Environment:   Home Environment: [address] [place] [address] Name: [facility]  # Steps to Enter: 0  One/Two Story Residence: One story  Living Alone: No  Support Systems: Skilled nursing facility  Patient Expects to be Discharged to[de-identified] Rehabilitation facility  Current DME Used/Available at Home: None*  Prior Level of Function/Work/Activity:  Per chart, patient is long term resident at [facility] and is non ambulatory. No family present to confirm. Assume assistance with ADLs. Dominant Side:         LEFT   Number of Personal Factors/Comorbidities that affect the Plan of Care: Expanded review of therapy/medical records (1-2):  MODERATE COMPLEXITY   ASSESSMENT OF OCCUPATIONAL PERFORMANCE[de-identified]   Activities of Daily Living:           Basic ADLs (From Assessment) Complex ADLs (From Assessment)   Basic ADL  Feeding: Minimum assistance  Oral Facial Hygiene/Grooming: Minimum assistance  Bathing: Maximum assistance  Upper Body Dressing: Minimum assistance  Lower Body Dressing: Maximum assistance  Toileting: Maximum assistance Instrumental ADL  Meal Preparation: Total assistance  Homemaking: Total assistance  Medication Management: Total assistance  Financial Management: Total assistance   Grooming/Bathing/Dressing Activities of Daily Living     Cognitive Retraining  Safety/Judgement: Fall prevention           Toileting  Bowel Hygiene:  Total assistance (dependent)  Clothing Management: Total assistance (dependent)           Bed/Mat Mobility  Supine to Sit: Total assistance  Sit to Stand: Maximum assistance;Assist x2  Bed to Chair: Maximum assistance;Assist x2       Most Recent Physical Functioning:   Gross Assessment:                  Posture:     Balance:  Sitting: Impaired  Sitting - Static: Fair (occasional)  Sitting - Dynamic: Fair (occasional)  Standing: Impaired  Standing - Static: Poor  Standing - Dynamic : Poor Bed Mobility:  Supine to Sit: Total assistance  Wheelchair Mobility: Transfers:  Sit to Stand: Maximum assistance;Assist x2  Stand to Sit: Total assistance  Bed to Chair: Maximum assistance;Assist x2                Patient Vitals for the past 6 hrs:   BP BP Patient Position SpO2 Pulse   18 0734 143/64 At rest 94 % (!) 58   18 1135 177/66 At rest 99 % (!) 57       Mental Status  Neurologic State: Alert  Orientation Level: Unable to verbalize  Cognition: Decreased command following  Perception: Cues to maintain midline in sitting  Perseveration: No perseveration noted  Safety/Judgement: Fall prevention                          Physical Skills Involved:  1. Range of Motion  2. Balance  3. Strength  4. Activity Tolerance  5. Pain (acute) Cognitive Skills Affected (resulting in the inability to perform in a timely and safe manner):  1. Perception  2. Executive Function  3. Long Term Memory Psychosocial Skills Affected:  1. Habits/Routines  2. Environmental Adaptation  3. Self-Awareness  4. Social Roles   Number of elements that affect the Plan of Care: 5+:  HIGH COMPLEXITY   CLINICAL DECISION MAKIN[address] [place] [id number] AM-PAC 6 Clicks   Daily Activity Inpatient Short Form  How much help from another person does the patient currently need. .. Total A Lot A Little None   1. Putting on and taking off regular lower body clothing? [] 1   [x] 2   [] 3   [] 4   2. Bathing (including washing, rinsing, drying)? [] 1   [x] 2   [] 3   [] 4   3. Toileting, which includes using toilet, bedpan or urinal?   [] 1   [x] 2   [] 3   [] 4   4. Putting on and taking off regular upper body clothing? [] 1   [x] 2   [] 3   [] 4   5. Taking care of personal grooming such as brushing teeth? [] 1   [] 2   [x] 3   [] 4   6. Eating meals? [] 1   [] 2   [x] 3   [] 4   © , Trustees of [address] [place] [address] [address] [address], under license to [employer].  All rights reserved      Score:  Initial: 14 Most Recent: X (Date: -- )    Interpretation of Tool:  Represents activities that are increasingly more difficult (i.e. Bed mobility, Transfers, Gait). Score 24 23 22-20 19-15 14-10 9-7 6     Modifier CH CI CJ CK CL CM CN      ? Self Care:     - CURRENT STATUS: CL - 60%-79% impaired, limited or restricted    - GOAL STATUS: CJ - 20%-39% impaired, limited or restricted    - D/C STATUS:  ---------------To be determined---------------  [place] / Product Type: Medicare /      Medical Necessity:     · Patient demonstrates good rehab potential due to higher previous functional level. Reason for Services/Other Comments:  · Patient continues to require present interventions due to patient's inability to care for self at pre-op level of function. Use of outcome tool(s) and clinical judgement create a POC that gives a: HIGH COMPLEXITY         TREATMENT:   (In addition to Assessment/Re-Assessment sessions the following treatments were rendered)     Pre-treatment Symptoms/Complaints:  None  Pain: Initial:   Pain Intensity 1: 0  Post Session:  None      Self Care: (10 minutes): Procedure(s) (per grid) utilized to improve and/or restore self-care/home management as related to toileting. Required maximal manual and tactile cueing to facilitate activities of daily living skills. Braces/Orthotics/Lines/Etc:   · IV  · O2 Device: Nasal cannula  Treatment/Session Assessment:    · Response to Treatment:  Tolerated well  · Interdisciplinary Collaboration:   o Certified Occupational Therapy Assistant  o Registered Nurse  · After treatment position/precautions:   o Up in chair  o Bed alarm/tab alert on  o Bed/Chair-wheels locked  o Call light within reach  o Nurse at bedside   · Compliance with Program/Exercises: compliant all of the time. · Recommendations/Intent for next treatment session: \"Next visit will focus on advancements to more challenging activities and reduction in assistance provided\".   Total Treatment Duration:  OT Patient Time In/Time Out  Time In: 1225  Time Out: [id number] [address] Ruchi Ceron

## 2018-03-04 NOTE — PROGRESS NOTES
Problem: Mobility Impaired (Adult and Pediatric)  Goal: *Acute Goals and Plan of Care (Insert Text)  STG:  (1.)Ms. Kory Andersen will move from supine to sit and sit to supine , scoot up and down and roll side to side with MODERATE ASSIST within 3 treatment day(s). (2.)Ms. Kory Andersen will transfer from bed to chair and chair to bed with MAXIMAL ASSIST using the least restrictive device within 3 treatment day(s). (3.)Ms. Kory Andersen will ambulate with MAXIMAL ASSIST for 15 feet with the least restrictive device within 3 treatment day(s). (4.)Ms. Kory Andersen will participate in therapeutic activity/exerices x 12 minutes for increased strength within 3 days. LTG:  (1.)Ms. Kory Andersen will move from supine to sit and sit to supine , scoot up and down and roll side to side in bed with MINIMAL ASSIST within 7 treatment day(s). (2.)Ms. Kory Andersen will transfer from bed to chair and chair to bed with MODERATE ASSIST using the least restrictive device within 7 treatment day(s). (3.)Ms. Kory Andersen will ambulate with MODERATE ASSIST for 50 feet with the least restrictive device within 7 treatment day(s). (4.)Ms. Kory Andersen will participate in therapeutic activity/exerices x 23 minutes for increased strength within 7 days.     ________________________________________________________________________________________________      PHYSICAL THERAPY: Daily Note, Treatment Day: 3rd, PM 3/4/2018  INPATIENT: Hospital Day: 4  [place] / [place] / Product Type: Medicare /    R ANTIONE WBAT     NAME/AGE/GENDER: Ap Andersen is a 80 y.o. female   PRIMARY DIAGNOSIS: Hip fracture ([address]  fractured right hip Closed fracture of right hip (HCC) Closed fracture of right hip (HCC)  Procedure(s) (LRB):  RIGHT HIP PERCUTANEOUS PINNING CANNULATED SCREWS (Right)  2 Days Post-Op  ICD-10: Treatment Diagnosis:   · Generalized Muscle Weakness (M62.81)  · Difficulty in walking, Not elsewhere classified (R26.2)  · Other abnormalities of gait and mobility (R26.89)  · History of falling (Z91.81)   Precaution/Allergies:  Review of patient's allergies indicates no known allergies. ASSESSMENT:     Ms. Roslyn Lee is a 80 y.o. female in the hospital for the above who was supine in bed upon arrival.  Per chart pt came from [facility] after falling. Pt presents to PT with weakness and decreased AROM in B LEs. Pt presents like she has a history of CVA with R side affected and increased R UE tone observed. She follows commands and participates but speaks very little. Stated yes when asked if she wanted to get back to bed. Attempted sit to stand x 2 without success. Additional person called to assist. Transferred with total assist to EOB and supine. . No progress toward goals as of yet. Pt could benefit from skilled PT to address earlier deficits. This section established at most recent assessment   PROBLEM LIST (Impairments causing functional limitations):  1. Decreased Strength  2. Decreased ADL/Functional Activities  3. Decreased Transfer Abilities  4. Decreased Ambulation Ability/Technique  5. Decreased Balance  6. Decreased Cognition   INTERVENTIONS PLANNED: (Benefits and precautions of physical therapy have been discussed with the patient.)  1. Balance Exercise  2. Bed Mobility  3. Family Education  4. Gait Training  5. Therapeutic Activites  6. Therapeutic Exercise/Strengthening  7. Transfer Training  8. Group Therapy     TREATMENT PLAN: Frequency/Duration: twice daily for duration of hospital stay  Rehabilitation Potential For Stated Goals: Asha Bryan REHABILITATION/EQUIPMENT: (at time of discharge pending progress): Due to the probability of continued deficits (see above) this patient will likely need continued skilled physical therapy after discharge.   Equipment:    None at this time              HISTORY:   History of Present Injury/Illness (Reason for Referral):  S/P RIGHT HIP PERCUTANEOUS PINNING CANNULATED SCREWS (Right)  Past Medical History/Comorbidities:   Ms. Adin Shaffer  has a past medical history of Arrhythmia; Dementia; GERD (gastroesophageal reflux disease); Hyperlipidemia; Hypertension; PVD (peripheral vascular disease) [facility] Stroke [facility]); and Thyroid disease. Ms. Adin Shaffer  has a past surgical history that includes hx other surgical.  Social History/Living Environment:   Home Environment: [address] [place] [address] Name: [facility]  # Steps to Enter: 0  One/Two Story Residence: One story  Living Alone: No  Support Systems: Skilled nursing facility  Patient Expects to be Discharged to[de-identified] Rehabilitation facility  Current DME Used/Available at Home: None  Prior Level of Function/Work/Activity:  Lives in a SNF per chart. Number of Personal Factors/Comorbidities that affect the Plan of Care: 1-2: MODERATE COMPLEXITY   EXAMINATION:   Most Recent Physical Functioning:   Gross Assessment:                  Posture:     Balance:  Sitting - Static: Prop sitting  Sitting - Dynamic: Poor (constant support)  Standing - Static: Poor  Standing - Dynamic : Poor Bed Mobility:  Supine to Sit: Total assistance  Sit to Supine: Total assistance  Wheelchair Mobility:     Transfers:  Sit to Stand: Total assistance  Stand to Sit: Total assistance  Gait:            Body Structures Involved:  1. Nerves  2. Bones  3. Joints  4. Muscles Body Functions Affected:  1. Mental  2. Neuromusculoskeletal  3. Movement Related Activities and Participation Affected:  1. Learning and Applying Knowledge  2. General Tasks and Demands  3. Mobility  4. Self Care  5. Domestic Life  6.  Community, Social and Grundy Sallis   Number of elements that affect the Plan of Care: 4+: HIGH COMPLEXITY   CLINICAL PRESENTATION:   Presentation: Evolving clinical presentation with changing clinical characteristics: MODERATE COMPLEXITY   CLINICAL DECISION MAKIN [facility] Inpatient Short Form  How much difficulty does the patient currently have. .. Unable A Lot A Little None   1. Turning over in bed (including adjusting bedclothes, sheets and blankets)? [] 1   [] 2   [x] 3   [] 4   2. Sitting down on and standing up from a chair with arms ( e.g., wheelchair, bedside commode, etc.)   [] 1   [x] 2   [] 3   [] 4   3. Moving from lying on back to sitting on the side of the bed? [] 1   [x] 2   [] 3   [] 4   How much help from another person does the patient currently need. .. Total A Lot A Little None   4. Moving to and from a bed to a chair (including a wheelchair)? [x] 1   [] 2   [] 3   [] 4   5. Need to walk in hospital room? [x] 1   [] 2   [] 3   [] 4   6. Climbing 3-5 steps with a railing? [x] 1   [] 2   [] 3   [] 4   © 2007, Trustees of [address] [place] [address] [address] [id number], under license to [employer]. All rights reserved      Score:  Initial: 10 Most Recent: X (Date: -- )    Interpretation of Tool:  Represents activities that are increasingly more difficult (i.e. Bed mobility, Transfers, Gait). Score 24 23 22-20 19-15 14-10 9-7 6     Modifier CH CI CJ CK CL CM CN      ? Mobility - Walking and Moving Around:     - CURRENT STATUS: CL - 60%-79% impaired, limited or restricted    - GOAL STATUS: CK - 40%-59% impaired, limited or restricted    - D/C STATUS:  ---------------To be determined---------------  [place] / [place] / Product Type: Medicare /      Medical Necessity:     · Patient demonstrates fair rehab potential due to higher previous functional level. Reason for Services/Other Comments:  · Patient continues to require skilled intervention due to decreased functional mobility and balance.    Use of outcome tool(s) and clinical judgement create a POC that gives a: Questionable prediction of patient's progress: MODERATE COMPLEXITY            TREATMENT:   (In addition to Assessment/Re-Assessment sessions the following treatments were rendered)   Pre-treatment Symptoms/Complaints:  Nonverbal mostly  Pain: Initial:   Pain Intensity 1: 0  Post Session:  0/10 visually     Therapeutic Activity: (    10 min): Therapeutic activities including bed mobility, gt on level surface and chair transfers to improve mobility, strength and balance. Required total   to promote dynamic balance in standing. Group Therapeutic Exercise: (   min):  Exercises per grid below to improve mobility, strength and balance. Required mod visual, verbal and tactile cues to promote proper body alignment, promote proper body posture and promote proper body mechanics. Date:  3/3/18 Date:  3/4/18 AM Date:     Activity/Exercise Seated Parameters Parameters Parameters   Heel raises X 10 B X 10 B    Toe raises X 10 B X 10 B    LAQ's X 10 B X 10 B    Hip Flex X 10 B X 10 B    Hip ABD X 10 B X 10 B          Shld flex X 10 B, AA R X 10 B, AA R    Shld horizontal ABD/ADD X 10 B, AA R X 10 B, AA R    Elbow flex X 10 B, AA R X 10 B, AA R    Elbow ext X 10 B, AA R X 10 B, AA R              Braces/Orthotics/Lines/Etc:   · room air  Treatment/Session Assessment:    · Response to Treatment:  Cooperative and follows commands. · Interdisciplinary Collaboration:   o Physical Therapy Assistant  o Certified Occupational Therapy Assistant  o Registered Nurse  · After treatment position/precautions:   o Supine in bed  o Bed alarm/tab alert on  o Bed/Chair-wheels locked  o Call light within reach   · Compliance with Program/Exercises: Will assess as treatment progresses. · Recommendations/Intent for next treatment session: \"Next visit will focus on advancements to more challenging activities and reduction in assistance provided\".   Total Treatment Duration:  PT Patient Time In/Time Out  Time In: 1332  Time Out: Nazia Casas PTA

## 2018-03-04 NOTE — PROGRESS NOTES
ORTHO    Sub:  Pt complains of expected pain. Pt up in chair now. Obj:    Vitals:    03/03/18 2059 03/04/18 0055 03/04/18 0401 03/04/18 0734   BP: 135/56 137/61 144/60 143/64   Pulse: 86 (!) 59 (!) 59 (!) 58   Resp: 18 18 17 18   Temp: 99 °F (37.2 °C) 98.5 °F (36.9 °C) 98.4 °F (36.9 °C) 98.8 °F (37.1 °C)   SpO2: 94% 96% 98% 94%   Weight:             Recent Labs      03/04/18   0540  03/03/18   0523  03/02/18   0525  03/01/18   1418   WBC  7.1  7.3  5.7  6.8   HGB  12.5  12.3  11.6*  13.6                 Dressing is dry and intact. Calves are soft and non tender. There is no redness or appearance of infection. N/V status is good. Assessment:   Progressing    Plan:   Continue with Physical Therapy.               Jerad Thurman MD

## 2018-03-04 NOTE — PROGRESS NOTES
Hospitalist Progress Note    Patient: Ap Cerna MRN: [id number]  SSN: [id number]    YOB: 1935  Age: 80 y.o. Sex: female      Admit Date: 3/1/2018    LOS: 3 days     Subjective:     From H&P: \"81 y/o F who presented from [facility] after being diagnosed with R fem neck fracture there. She suffered a fall on 2/19/18 which is believed to be the etiology per notes. She is not very talkative but denies complaints except for R hip pain, though controlled with pain meds. She denies CP, SOB, palpitations, fevers, chills, n/v, or other complaints NOS. Does not know her medical or surgical history. Per report she has dementia. DNR papers on chart from SNF. No family present. \"    3/2 - I saw the patient after surgery. She was still sleepy. Only shook her head to questions, so not sure if she understood. Staff has been trying to get in touch with family. 3/3 - Sitting in chair this AM. Feels good. Asking to go for a walk. 3/4 - Patient states that she feels good today. Doing well. Review of systems negative except stated above. Objective:     Visit Vitals    /64 (BP 1 Location: Left arm, BP Patient Position: At rest)    Pulse (!) 58    Temp 98.8 °F (37.1 °C)    Resp 18    Wt 44.7 kg (98 lb 8 oz)    SpO2 94%    Breastfeeding No      Oxygen Therapy  O2 Sat (%): 94 % (03/04/18 0734)  Pulse via Oximetry: 59 beats per minute (03/04/18 0401)  O2 Device: Room air (03/04/18 0401)  O2 Flow Rate (L/min): 2 l/min (03/02/18 1358)      Intake and Output:     Intake/Output Summary (Last 24 hours) at 03/04/18 1056  Last data filed at 03/03/18 1711   Gross per 24 hour   Intake              437 ml   Output                0 ml   Net              437 ml         Physical Exam:   GENERAL: Drowsy, cooperative, no distress, appears stated age  EYE: conjunctivae/corneas clear. PERRL. THROAT & NECK: normal and no erythema or exudates noted.    LUNG: clear to auscultation bilaterally  HEART: regular rate and rhythm, S1S2, no murmur, no JVD  ABDOMEN: soft, non-tender, non-distended. Bowel sounds normal.   EXTREMITIES:  No edema, 2+ pedal/radial pulses bilaterally  SKIN: no rash or abnormalities  NEUROLOGIC: Drowsy. Cranial nerves 2-12 grossly intact. Lab/Data Review:  Recent Results (from the past 24 hour(s))   PLEASE READ & DOCUMENT PPD TEST IN 48 HRS    Collection Time: 03/03/18  7:01 PM   Result Value Ref Range    PPD Negative Negative    mm Induration 0mm mm   CBC WITH AUTOMATED DIFF    Collection Time: 03/04/18  5:40 AM   Result Value Ref Range    WBC 7.1 4.3 - 11.1 K/uL    RBC 4.12 4.05 - 5.25 M/uL    HGB 12.5 11.7 - 15.4 g/dL    HCT 38.4 35.8 - 46.3 %    MCV 93.2 79.6 - 97.8 FL    MCH 30.3 26.1 - 32.9 PG    MCHC 32.6 31.4 - 35.0 g/dL    RDW 14.1 11.9 - 14.6 %    PLATELET 242 677 - 685 K/uL    MPV 12.8 10.8 - 14.1 FL    DF AUTOMATED      NEUTROPHILS 76 43 - 78 %    LYMPHOCYTES 16 13 - 44 %    MONOCYTES 8 4.0 - 12.0 %    EOSINOPHILS 0 (L) 0.5 - 7.8 %    BASOPHILS 0 0.0 - 2.0 %    IMMATURE GRANULOCYTES 0 0.0 - 5.0 %    ABS. NEUTROPHILS 5.3 1.7 - 8.2 K/UL    ABS. LYMPHOCYTES 1.2 0.5 - 4.6 K/UL    ABS. MONOCYTES 0.6 0.1 - 1.3 K/UL    ABS. EOSINOPHILS 0.0 0.0 - 0.8 K/UL    ABS. BASOPHILS 0.0 0.0 - 0.2 K/UL    ABS. IMM. GRANS. 0.0 0.0 - 0.5 K/UL   METABOLIC PANEL, BASIC    Collection Time: 03/04/18  5:40 AM   Result Value Ref Range    Sodium 147 (H) 136 - 145 mmol/L    Potassium 3.7 3.5 - 5.1 mmol/L    Chloride 109 (H) 98 - 107 mmol/L    CO2 31 21 - 32 mmol/L    Anion gap 7 7 - 16 mmol/L    Glucose 85 65 - 100 mg/dL    BUN 18 8 - 23 MG/DL    Creatinine 0.76 0.6 - 1.0 MG/DL    GFR est AA >60 >60 ml/min/1.73m2    GFR est non-AA >60 >60 ml/min/1.73m2    Calcium 8.4 8.3 - 10.4 MG/DL       Imaging:  Xr Hip Rt W Or Wo Pelv 2-3 Vws    Result Date: 3/1/2018  IMPRESSION: Impacted right femoral neck fracture. Xr Hip Rt During Or Proc    Result Date: 3/2/2018  Impression: Intraoperative fixation as above. Xr Femur Rt 2 Vs    Result Date: 3/1/2018  IMPRESSION: Subcapital right femoral neck fracture. Xr Chest Port    Result Date: 3/1/2018  IMPRESSION: Possible tiny left pleural effusion. Cultures: All Micro Results     Procedure Component Value Units Date/Time    MSSA/MRSA SC BY PCR, NASAL SWAB [[id number]] Collected:  03/01/18 1802    Order Status:  Completed Specimen:  Swab Updated:  03/01/18 2044     Special Requests: NO SPECIAL REQUESTS        Culture result:         SA target not detected. A MRSA NEGATIVE, SA NEGATIVE test result does not preclude MRSA or SA nasal colonization. MSSA/MRSA SC BY PCR, NASAL SWAB [[id number]] Collected:  03/01/18 1630    Order Status:  Canceled Specimen:  Swab           Assessment/Plan:     Principal Problem:    Closed fracture of right hip ([address] (3/1/2018)  - S/P Intramedullary Pin  - Appears to be doing well  - PT/OT  - Pain control per ortho    Active Problems:    Dementia without behavioral disturbance (3/1/2018)  - Exelon patch  - Lexapro  - Hold Namenda      Atrial Fibrillation (3/2/2018)  - Stable  - Continue Amiodarone and Lopressor  - Restarted Eliquis this AM      Essential hypertension (3/1/2018)  - Stable  - Amlodipine  - Lisinopril  - Lopressor      Hyperlipidemia (3/2/2018)  - Continue Pravastain      DNR (do not resuscitate) (3/1/2018)  - Per SNF paperwork    Dispo - Discharge to rehab when appropriate    DIET NUTRITIONAL SUPPLEMENTS All Meals; Magic Cups  DIET NUTRITIONAL SUPPLEMENTS All Meals; Ensure Enlive  DIET REGULAR  DIET NUTRITIONAL SUPPLEMENTS All Meals;  Ensure Enlive    DVT Prophylaxis: Eliquis      Signed By: Yajaira Pickard DO     March 4, 2018

## 2018-03-04 NOTE — PROGRESS NOTES
Problem: Falls - Risk of  Goal: *Absence of Falls  Document Calvin Fall Risk and appropriate interventions in the flowsheet.    Outcome: Progressing Towards Goal  Fall Risk Interventions:  Mobility Interventions: Bed/chair exit alarm, Patient to call before getting OOB    Mentation Interventions: Bed/chair exit alarm, Door open when patient unattended    Medication Interventions: Bed/chair exit alarm, Patient to call before getting OOB    Elimination Interventions: Bed/chair exit alarm, Call light in reach    History of Falls Interventions: Bed/chair exit alarm

## 2018-03-04 NOTE — PROGRESS NOTES
Provided supportive presence for this patient. Language barrier evident. Patient was calm and seemed receptive to my words of hope. There was no family present. Will continue to assess needs during her stay.     Elsy Mccallum, staff Reina shipman [address], [address] [facility]  /   [email or web address]

## 2018-03-05 PROCEDURE — 97110 THERAPEUTIC EXERCISES: CPT

## 2018-03-05 PROCEDURE — 74011250637 HC RX REV CODE- 250/637: Performed by: NURSE PRACTITIONER

## 2018-03-05 PROCEDURE — 74011250637 HC RX REV CODE- 250/637: Performed by: INTERNAL MEDICINE

## 2018-03-05 PROCEDURE — 97150 GROUP THERAPEUTIC PROCEDURES: CPT

## 2018-03-05 PROCEDURE — 65270000029 HC RM PRIVATE

## 2018-03-05 PROCEDURE — 97530 THERAPEUTIC ACTIVITIES: CPT

## 2018-03-05 PROCEDURE — 74011250637 HC RX REV CODE- 250/637: Performed by: ORTHOPAEDIC SURGERY

## 2018-03-05 RX ADMIN — DOCUSATE SODIUM 100 MG: 100 CAPSULE, LIQUID FILLED ORAL at 08:06

## 2018-03-05 RX ADMIN — AMIODARONE HYDROCHLORIDE 200 MG: 200 TABLET ORAL at 08:08

## 2018-03-05 RX ADMIN — CALCIUM CARBONATE 500 MG (1,250 MG)-VITAMIN D3 200 UNIT TABLET 1 TABLET: at 12:05

## 2018-03-05 RX ADMIN — TRAMADOL HYDROCHLORIDE 50 MG: 50 TABLET, FILM COATED ORAL at 05:33

## 2018-03-05 RX ADMIN — Medication 1 AMPULE: at 21:04

## 2018-03-05 RX ADMIN — Medication 1 AMPULE: at 08:11

## 2018-03-05 RX ADMIN — ACETAMINOPHEN 650 MG: 325 TABLET ORAL at 21:04

## 2018-03-05 RX ADMIN — CALCIUM CARBONATE 500 MG (1,250 MG)-VITAMIN D3 200 UNIT TABLET 1 TABLET: at 08:07

## 2018-03-05 RX ADMIN — Medication 10 ML: at 21:04

## 2018-03-05 RX ADMIN — AMLODIPINE BESYLATE 10 MG: 10 TABLET ORAL at 08:07

## 2018-03-05 RX ADMIN — CALCIUM CARBONATE 500 MG (1,250 MG)-VITAMIN D3 200 UNIT TABLET 1 TABLET: at 17:15

## 2018-03-05 RX ADMIN — PRAVASTATIN SODIUM 40 MG: 20 TABLET ORAL at 08:08

## 2018-03-05 RX ADMIN — APIXABAN 2.5 MG: 2.5 TABLET, FILM COATED ORAL at 17:14

## 2018-03-05 RX ADMIN — Medication 10 ML: at 13:29

## 2018-03-05 RX ADMIN — DOCUSATE SODIUM 100 MG: 100 CAPSULE, LIQUID FILLED ORAL at 17:15

## 2018-03-05 RX ADMIN — LISINOPRIL 40 MG: 20 TABLET ORAL at 08:06

## 2018-03-05 RX ADMIN — ESCITALOPRAM OXALATE 5 MG: 10 TABLET ORAL at 08:06

## 2018-03-05 RX ADMIN — LEVOTHYROXINE SODIUM 50 MCG: 50 TABLET ORAL at 05:33

## 2018-03-05 RX ADMIN — ACETAMINOPHEN 650 MG: 325 TABLET ORAL at 05:33

## 2018-03-05 RX ADMIN — ACETAMINOPHEN 650 MG: 325 TABLET ORAL at 13:29

## 2018-03-05 RX ADMIN — APIXABAN 2.5 MG: 2.5 TABLET, FILM COATED ORAL at 08:08

## 2018-03-05 RX ADMIN — Medication 10 ML: at 05:34

## 2018-03-05 NOTE — PROGRESS NOTES
Hospitalist Progress Note    Patient: Ap Cerna MRN: [id number]  SSN: [id number]    YOB: 1935  Age: 80 y.o. Sex: female      Admit Date: 3/1/2018    LOS: 4 days     Subjective:     From H&P: \"79 y/o F who presented from [facility] after being diagnosed with R fem neck fracture there. She suffered a fall on 2/19/18 which is believed to be the etiology per notes. She is not very talkative but denies complaints except for R hip pain, though controlled with pain meds. She denies CP, SOB, palpitations, fevers, chills, n/v, or other complaints NOS. Does not know her medical or surgical history. Per report she has dementia. DNR papers on chart from SNF. No family present. \"    3/2 - I saw the patient after surgery. She was still sleepy. Only shook her head to questions, so not sure if she understood. Staff has been trying to get in touch with family. 3/3 - Sitting in chair this AM. Feels good. Asking to go for a walk. 3/4 - Patient states that she feels good today. Doing well. 3/5 - Doing well this AM. No new acute complaints. Review of systems negative except stated above. Objective:     Visit Vitals    /64 (BP 1 Location: Left arm, BP Patient Position: At rest)    Pulse 62    Temp 98.9 °F (37.2 °C)    Resp 17    Wt 44.7 kg (98 lb 8 oz)    SpO2 97%    Breastfeeding No      Oxygen Therapy  O2 Sat (%): 97 % (03/05/18 0758)  Pulse via Oximetry: 59 beats per minute (03/04/18 0401)  O2 Device: Room air (03/04/18 0401)  O2 Flow Rate (L/min): 2 l/min (03/02/18 1358)      Intake and Output:     Intake/Output Summary (Last 24 hours) at 03/05/18 1130  Last data filed at 03/05/18 4153   Gross per 24 hour   Intake              417 ml   Output                0 ml   Net              417 ml         Physical Exam:   GENERAL: Drowsy, cooperative, no distress, appears stated age  EYE: conjunctivae/corneas clear. PERRL. THROAT & NECK: normal and no erythema or exudates noted.    LUNG: clear to auscultation bilaterally  HEART: regular rate and rhythm, S1S2, no murmur, no JVD  ABDOMEN: soft, non-tender, non-distended. Bowel sounds normal.   EXTREMITIES:  No edema, 2+ pedal/radial pulses bilaterally  SKIN: no rash or abnormalities  NEUROLOGIC: Drowsy. Cranial nerves 2-12 grossly intact. Lab/Data Review:  Recent Results (from the past 24 hour(s))   PLEASE READ & DOCUMENT PPD TEST IN 72 HRS    Collection Time: 03/04/18  7:04 PM   Result Value Ref Range    PPD negative Negative    mm Induration 0 mm       Imaging:  Xr Hip Rt W Or Wo Pelv 2-3 Vws    Result Date: 3/1/2018  IMPRESSION: Impacted right femoral neck fracture. Xr Hip Rt During Or Proc    Result Date: 3/2/2018  Impression: Intraoperative fixation as above. Xr Femur Rt 2 Vs    Result Date: 3/1/2018  IMPRESSION: Subcapital right femoral neck fracture. Xr Chest Port    Result Date: 3/1/2018  IMPRESSION: Possible tiny left pleural effusion. Cultures: All Micro Results     Procedure Component Value Units Date/Time    MSSA/MRSA SC BY PCR, NASAL SWAB [[id number]] Collected:  03/01/18 1802    Order Status:  Completed Specimen:  Swab Updated:  03/01/18 2044     Special Requests: NO SPECIAL REQUESTS        Culture result:         SA target not detected. A MRSA NEGATIVE, SA NEGATIVE test result does not preclude MRSA or SA nasal colonization.     MSSA/MRSA SC BY PCR, NASAL SWAB [[id number]] Collected:  03/01/18 1630    Order Status:  Canceled Specimen:  Swab           Assessment/Plan:     Principal Problem:    Closed fracture of right hip ([address] (3/1/2018)  - S/P Intramedullary Pin  - Appears to be doing well  - PT/OT  - Pain control per ortho    Active Problems:    Dementia without behavioral disturbance (3/1/2018)  - Exelon patch  - Lexapro  - Hold Namenda      Atrial Fibrillation (3/2/2018)  - Stable  - Continue Amiodarone and Lopressor  - Restarted Eliquis this AM      Essential hypertension (3/1/2018)  - Stable  - Amlodipine  - Lisinopril  - Lopressor      Hyperlipidemia (3/2/2018)  - Continue Pravastain      DNR (do not resuscitate) (3/1/2018)  - Per SNF paperwork    Dispo - Discharge to rehab when appropriate    DIET NUTRITIONAL SUPPLEMENTS All Meals; Magic Cups  DIET NUTRITIONAL SUPPLEMENTS All Meals; Ensure Enlive  DIET REGULAR  DIET NUTRITIONAL SUPPLEMENTS All Meals;  Ensure Enlive    DVT Prophylaxis: Eliquis      Signed By: Aleida Segura DO     March 5, 2018

## 2018-03-05 NOTE — PROGRESS NOTES
Problem: Self Care Deficits Care Plan (Adult)  Goal: *Acute Goals and Plan of Care (Insert Text)  1. Patient will feed self entire meal with setup. 2. Patient will complete functional transfers with moderate assistance while maintaining WBAT status in RLE and with adaptive equipment as needed. 3. Patient will complete lower body bathing and dressing with moderate assistance and adaptive equipment as needed. 4. Patient will complete toileting and toilet transfer with moderate assistance. 5. Patient will tolerate 20 minutes of OT treatment with as needed rest breaks to increase activity tolerance for ADLs. 6. Patient will participate in at least 15 minutes of BUE therapeutic exercises to strengthen BUE for functional transfers. Timeframe: 7 visits       OCCUPATIONAL THERAPY: Daily Note, Treatment Day: 2nd and AM 3/5/2018  INPATIENT: Hospital Day: 5  [place] / Product Type: Medicare /      NAME/AGE/GENDER: pA Roldan is a 80 y.o. female   PRIMARY DIAGNOSIS:  Hip fracture ([facility] [address]  fractured right hip Closed fracture of right hip (HCC) Closed fracture of right hip (HCC)  Procedure(s) (LRB):  RIGHT HIP PERCUTANEOUS PINNING CANNULATED SCREWS (Right)  3 Days Post-Op  ICD-10: Treatment Diagnosis:    · Pain in Right Hip (M25.551)  · Stiffness of Right Hip, Not elsewhere classified (M25.651)  · Repeated Falls (R29.6)  · History of falling (Z91.81)   Precautions/Allergies:    WBAT RLE   Fall Precautions  Review of patient's allergies indicates no known allergies. ASSESSMENT:   Ms. Qamar Roldan was admitted for the above diagnosis. Pt was brought to therapy gym to participate in group exercises (in grid below) to increase activity tolerance and strength to perform mobility and ADLs. Pt did fair following commands. Still with some tone and weakness in RUE. Pt given foam block to increase in hand strength. Pt making minimal progress towards goals above.  Will continue to benefit from skilled OT during stay. This section established at most recent assessment   PROBLEM LIST (Impairments causing functional limitations):  1. Decreased Strength  2. Decreased ADL/Functional Activities  3. Decreased Transfer Abilities  4. Decreased Ambulation Ability/Technique  5. Decreased Balance  6. Increased Pain  7. Decreased Activity Tolerance  8. Decreased Flexibility/Joint Mobility  9. Decreased Knowledge of Precautions   INTERVENTIONS PLANNED: (Benefits and precautions of occupational therapy have been discussed with the patient.)  1. Activities of daily living training  2. Adaptive equipment training  3. Group therapy  4. Therapeutic activity  5. Therapeutic exercise     TREATMENT PLAN: Frequency/Duration: Follow patient 6x/ week to address above goals. Rehabilitation Potential For Stated Goals: Fair     RECOMMENDED REHABILITATION/EQUIPMENT: (at time of discharge pending progress): Due to the probability of continued deficits (see above) this patient will likely need continued skilled occupational therapy after discharge. Equipment:    None at this time              OCCUPATIONAL PROFILE AND HISTORY:   History of Present Injury/Illness (Reason for Referral):  Per H&P, \"Pt is an 81 y/o F who presented from [facility] after being diagnosed with R fem neck fracture there. She suffered a fall on 2/19/18 which is believed to be the etiology per notes. She is not very talkative but denies complaints except for R hip pain, though controlled with pain meds. She denies CP, SOB, palpitations, fevers, chills, n/v, or other complaints NOS. Does not know her medical or surgical history. Per report she has dementia. DNR papers on chart from SNF. No family present. \"     Past Medical History/Comorbidities:   Ms. Alex Osei  has a past medical history of Arrhythmia; Dementia; GERD (gastroesophageal reflux disease); Hyperlipidemia; Hypertension; PVD (peripheral vascular disease) ([facility] [address]  Stroke [facility]); and Thyroid disease. Ms. Rosa Ruiz  has a past surgical history that includes hx other surgical.  Social History/Living Environment:   Home Environment: [address] [place] [address] Name: [facility]  # Steps to Enter: 0  One/Two Story Residence: One story  Living Alone: No  Support Systems: Skilled nursing facility  Patient Expects to be Discharged to[de-identified] Rehabilitation facility  Current DME Used/Available at Home: None*  Prior Level of Function/Work/Activity:  Per chart, patient is long term resident at [facility] and is non ambulatory. No family present to confirm. Assume assistance with ADLs. Dominant Side:         LEFT   Number of Personal Factors/Comorbidities that affect the Plan of Care: Expanded review of therapy/medical records (1-2):  MODERATE COMPLEXITY   ASSESSMENT OF OCCUPATIONAL PERFORMANCE[de-identified]   Activities of Daily Living:           Basic ADLs (From Assessment) Complex ADLs (From Assessment)   Basic ADL  Feeding: Minimum assistance  Oral Facial Hygiene/Grooming: Minimum assistance  Bathing: Maximum assistance  Upper Body Dressing: Minimum assistance  Lower Body Dressing: Maximum assistance  Toileting: Maximum assistance Instrumental ADL  Meal Preparation: Total assistance  Homemaking: Total assistance  Medication Management: Total assistance  Financial Management: Total assistance   Grooming/Bathing/Dressing Activities of Daily Living     Cognitive Retraining  Safety/Judgement: Awareness of environment                       Bed/Mat Mobility  Supine to Sit: Moderate assistance; Additional time  Sit to Supine:  (NT)  Sit to Stand: Maximum assistance;Assist x2; Moderate assistance  Bed to Chair: Maximum assistance; Total assistance;Assist x2       Most Recent Physical Functioning:   Gross Assessment:                  Posture:     Balance:  Sitting: Impaired  Sitting - Static: Fair (occasional)  Sitting - Dynamic: Fair (occasional)  Standing: Impaired  Standing - Static: Poor  Standing - Dynamic : Poor Bed Mobility:  Supine to Sit: Moderate assistance; Additional time  Sit to Supine:  (NT)  Wheelchair Mobility:     Transfers:  Sit to Stand: Maximum assistance;Assist x2; Moderate assistance  Stand to Sit: Moderate assistance;Maximum assistance;Assist x2  Bed to Chair: Maximum assistance; Total assistance;Assist x2                Patient Vitals for the past 6 hrs:   BP BP Patient Position SpO2 Pulse   18 0758 143/64 At rest 97 % 62   18 1157 144/63 Sitting 98 % 64       Mental Status  Neurologic State: Alert  Orientation Level: Oriented to person  Cognition: Follows commands  Perception: Appears intact  Perseveration: No perseveration noted  Safety/Judgement: Awareness of environment                          Physical Skills Involved:  1. Range of Motion  2. Balance  3. Strength  4. Activity Tolerance  5. Pain (acute) Cognitive Skills Affected (resulting in the inability to perform in a timely and safe manner):  1. Perception  2. Executive Function  3. Long Term Memory Psychosocial Skills Affected:  1. Habits/Routines  2. Environmental Adaptation  3. Self-Awareness  4. Social Roles   Number of elements that affect the Plan of Care: 5+:  HIGH COMPLEXITY   CLINICAL DECISION MAKIN[address] [place] [address] AM-PAC 6 Clicks   Daily Activity Inpatient Short Form  How much help from another person does the patient currently need. .. Total A Lot A Little None   1. Putting on and taking off regular lower body clothing? [] 1   [x] 2   [] 3   [] 4   2. Bathing (including washing, rinsing, drying)? [] 1   [x] 2   [] 3   [] 4   3. Toileting, which includes using toilet, bedpan or urinal?   [] 1   [x] 2   [] 3   [] 4   4. Putting on and taking off regular upper body clothing? [] 1   [x] 2   [] 3   [] 4   5. Taking care of personal grooming such as brushing teeth? [] 1   [] 2   [x] 3   [] 4   6. Eating meals? [] 1   [] 2   [x] 3   [] 4   © , Trustees of [address] [place] [address], under license to [employer].  All rights reserved      Score:  Initial: 14 Most Recent: X (Date: -- )    Interpretation of Tool:  Represents activities that are increasingly more difficult (i.e. Bed mobility, Transfers, Gait). Score 24 23 22-20 19-15 14-10 9-7 6     Modifier CH CI CJ CK CL CM CN      ? Self Care:     - CURRENT STATUS: CL - 60%-79% impaired, limited or restricted    - GOAL STATUS: CJ - 20%-39% impaired, limited or restricted    - D/C STATUS:  ---------------To be determined---------------  [place] / Product Type: Medicare /      Medical Necessity:     · Patient demonstrates good rehab potential due to higher previous functional level. Reason for Services/Other Comments:  · Patient continues to require present interventions due to patient's inability to care for self at pre-op level of function. Use of outcome tool(s) and clinical judgement create a POC that gives a: HIGH COMPLEXITY         TREATMENT:   (In addition to Assessment/Re-Assessment sessions the following treatments were rendered)     Pre-treatment Symptoms/Complaints:  None  Pain: Initial:   Pain Intensity 1: 0  Post Session:  None      Group Therapeutic Exercise: (10 minutes):  Exercises per grid below to improve mobility, strength and activity tolerance. Required minimal visual, verbal and tactile cues to promote proper body alignment and promote proper body mechanics. Progressed resistance and repetitions as indicated.     UE Exercises (with 1# dowel) Date:  3/5/2018 Date:   Date:     Activity/Exercise Parameters Parameters Parameters   Shoulder Abd/Adduction 10 reps AROM with some assistance     Shoulder Flexion 10 reps with dowel and assistance     Elbow Flexion 10 reps with dowel and assistance     Punches 10 reps with dowel and assistance                             Braces/Orthotics/Lines/Etc:   · IV  · O2 Device: Nasal cannula  Treatment/Session Assessment:    · Response to Treatment:  Tolerated well  · Interdisciplinary Collaboration:   o Certified Occupational Therapy Assistant  o Registered Nurse  · After treatment position/precautions:   o Up in chair  o Bed alarm/tab alert on  o Bed/Chair-wheels locked  o Call light within reach   · Compliance with Program/Exercises: compliant all of the time. · Recommendations/Intent for next treatment session: \"Next visit will focus on advancements to more challenging activities and reduction in assistance provided\".   Total Treatment Duration:  OT Patient Time In/Time Out  Time In: 1130  Time Out: 1535 [facility]

## 2018-03-05 NOTE — PROGRESS NOTES
Problem: Mobility Impaired (Adult and Pediatric)  Goal: *Acute Goals and Plan of Care (Insert Text)  STG:  (1.)Ms. Deb Payan will move from supine to sit and sit to supine , scoot up and down and roll side to side with MODERATE ASSIST within 3 treatment day(s). (2.)Ms. Deb Payan will transfer from bed to chair and chair to bed with MAXIMAL ASSIST using the least restrictive device within 3 treatment day(s). (3.)Ms. Deb Payan will ambulate with MAXIMAL ASSIST for 15 feet with the least restrictive device within 3 treatment day(s). (4.)Ms. Deb Payan will participate in therapeutic activity/exerices x 12 minutes for increased strength within 3 days. LTG:  (1.)Ms. Deb Payan will move from supine to sit and sit to supine , scoot up and down and roll side to side in bed with MINIMAL ASSIST within 7 treatment day(s). (2.)Ms. Deb Payan will transfer from bed to chair and chair to bed with MODERATE ASSIST using the least restrictive device within 7 treatment day(s). (3.)Ms. Deb Payan will ambulate with MODERATE ASSIST for 50 feet with the least restrictive device within 7 treatment day(s). (4.)Ms. Deb Payan will participate in therapeutic activity/exerices x 23 minutes for increased strength within 7 days.     ________________________________________________________________________________________________    PHYSICAL THERAPY: Daily Note, Treatment Day: 4th, AM 3/5/2018  INPATIENT: Hospital Day: 5  [place] / Product Type: Medicare /    R LE WBAT     NAME/AGE/GENDER: Ap Payan is a 80 y.o. female   PRIMARY DIAGNOSIS: Hip fracture ([facility] [address]  fractured right hip Closed fracture of right hip (HCC) Closed fracture of right hip (HCC)  Procedure(s) (LRB):  RIGHT HIP PERCUTANEOUS PINNING CANNULATED SCREWS (Right)  3 Days Post-Op  ICD-10: Treatment Diagnosis:   · Generalized Muscle Weakness (M62.81)  · Difficulty in walking, Not elsewhere classified (R26.2)  · Other abnormalities of gait and mobility (R26.89)  · History of falling (Z91.81)   Precaution/Allergies:  Review of patient's allergies indicates no known allergies. ASSESSMENT:     Ms. Benito De Dios is a 80 y.o. female in the hospital for the above who was supine in bed upon arrival.  Per chart pt came from [facility] after falling. Pt presents to PT with weakness and decreased AROM in B LEs. Pt presents like she has a history of CVA with R side affected and increased R UE tone observed. Patient was supine upon contact and agreeable to PT. Patient is confused at baseline but follows commands. Patient able to perform supine to sit with mod assist, additional time, and cues for proper technique. Patient demonstrates fair sitting balance EOB. Patient able to transfer to standing with mod-max assist x 2 and cues for improved/proper technique. Once standing patient unable to perform gait training or take steps to recliner chair. Patient requires max-total assist x 2 to perform SPT to recliner chair. Patient was later rolled to therapy gym to participate in group therapeutic strengthening exercises to improve functional strength for transfers, gait and overall mobility. Patient requires cues and assistance to perform exercises correctly. Overall slow, steady progress towards physical therapy goals. No goals have been met thus far. Will continue efforts. This section established at most recent assessment   PROBLEM LIST (Impairments causing functional limitations):  1. Decreased Strength  2. Decreased ADL/Functional Activities  3. Decreased Transfer Abilities  4. Decreased Ambulation Ability/Technique  5. Decreased Balance  6. Decreased Cognition   INTERVENTIONS PLANNED: (Benefits and precautions of physical therapy have been discussed with the patient.)  1. Balance Exercise  2. Bed Mobility  3. Family Education  4. Gait Training  5. Therapeutic Activites  6. Therapeutic Exercise/Strengthening  7. Transfer Training  8.  Group Therapy TREATMENT PLAN: Frequency/Duration: twice daily for duration of hospital stay  Rehabilitation Potential For Stated Goals: Torie Murphy REHABILITATION/EQUIPMENT: (at time of discharge pending progress): Due to the probability of continued deficits (see above) this patient will likely need continued skilled physical therapy after discharge. Equipment:    None at this time              HISTORY:   History of Present Injury/Illness (Reason for Referral):  S/P RIGHT HIP PERCUTANEOUS PINNING CANNULATED SCREWS (Right)  Past Medical History/Comorbidities:   Ms. Thea Hagan  has a past medical history of Arrhythmia; Dementia; GERD (gastroesophageal reflux disease); Hyperlipidemia; Hypertension; PVD (peripheral vascular disease) ([facility] [address] Stroke [facility]); and Thyroid disease. Ms. Thea Hagan  has a past surgical history that includes hx other surgical.  Social History/Living Environment:   Home Environment: [address] [place] [address] Name: [facility]  # Steps to Enter: 0  One/Two Story Residence: One story  Living Alone: No  Support Systems: Skilled nursing facility  Patient Expects to be Discharged to[de-identified] Rehabilitation facility  Current DME Used/Available at Home: None  Prior Level of Function/Work/Activity:  Lives in a SNF per chart. Number of Personal Factors/Comorbidities that affect the Plan of Care: 1-2: MODERATE COMPLEXITY   EXAMINATION:   Most Recent Physical Functioning:   Gross Assessment:                  Posture:     Balance:  Sitting: Impaired  Sitting - Static: Fair (occasional)  Sitting - Dynamic: Fair (occasional)  Standing: Impaired  Standing - Static: Poor  Standing - Dynamic : Poor Bed Mobility:  Supine to Sit: Moderate assistance; Additional time  Sit to Supine:  (NT)  Wheelchair Mobility:     Transfers:  Sit to Stand: Maximum assistance;Assist x2; Moderate assistance  Stand to Sit: Moderate assistance;Maximum assistance;Assist x2  Bed to Chair: Maximum assistance; Total assistance;Assist x2  Gait:            Body Structures Involved:  1. Nerves  2. Bones  3. Joints  4. Muscles Body Functions Affected:  1. Mental  2. Neuromusculoskeletal  3. Movement Related Activities and Participation Affected:  1. Learning and Applying Knowledge  2. General Tasks and Demands  3. Mobility  4. Self Care  5. Domestic Life  6. Community, Social and [place]   Number of elements that affect the Plan of Care: 4+: HIGH COMPLEXITY   CLINICAL PRESENTATION:   Presentation: Evolving clinical presentation with changing clinical characteristics: MODERATE COMPLEXITY   CLINICAL DECISION MAKIN [facility] Mobility Inpatient Short Form  How much difficulty does the patient currently have. .. Unable A Lot A Little None   1. Turning over in bed (including adjusting bedclothes, sheets and blankets)? [] 1   [] 2   [x] 3   [] 4   2. Sitting down on and standing up from a chair with arms ( e.g., wheelchair, bedside commode, etc.)   [] 1   [x] 2   [] 3   [] 4   3. Moving from lying on back to sitting on the side of the bed? [] 1   [x] 2   [] 3   [] 4   How much help from another person does the patient currently need. .. Total A Lot A Little None   4. Moving to and from a bed to a chair (including a wheelchair)? [x] 1   [] 2   [] 3   [] 4   5. Need to walk in hospital room? [x] 1   [] 2   [] 3   [] 4   6. Climbing 3-5 steps with a railing? [x] 1   [] 2   [] 3   [] 4   © , Trustees of [address] [place] [address] [address] [address], under license to [employer]. All rights reserved      Score:  Initial: 10 Most Recent: X (Date: -- )    Interpretation of Tool:  Represents activities that are increasingly more difficult (i.e. Bed mobility, Transfers, Gait). Score 24 23 22-20 19-15 14-10 9-7 6     Modifier CH CI CJ CK CL CM CN      ?  Mobility - Walking and Moving Around:     - CURRENT STATUS: CL - 60%-79% impaired, limited or restricted    - GOAL STATUS: CK - 40%-59% impaired, limited or restricted    - D/C STATUS:  ---------------To be determined---------------  [place] / Product Type: Medicare /      Medical Necessity:     · Patient demonstrates fair rehab potential due to higher previous functional level. Reason for Services/Other Comments:  · Patient continues to require skilled intervention due to decreased functional mobility and balance. Use of outcome tool(s) and clinical judgement create a POC that gives a: Questionable prediction of patient's progress: MODERATE COMPLEXITY            TREATMENT:   (In addition to Assessment/Re-Assessment sessions the following treatments were rendered)   Pre-treatment Symptoms/Complaints:  None  Pain: Initial:   Pain Intensity 1: 0  Post Session:  0/10 visually     Therapeutic Activity: (    15 minutes): Therapeutic activities including bed mobility training, transfer training, static/dynamic sitting/standing balance training, posture training, attempted gait training, scooting, instruction in sequencing with rolling walker, and patient education to improve mobility, strength and balance. Required moderate-maximal verbal, tactile and manual cues   to promote static and dynamic balance in standing and promote coordination of bilateral, lower extremity(s). Group Therapeutic Exercise: (  10 minutes):  Exercises per grid below to improve mobility, strength and balance. Required moderate visual, verbal and tactile cues to promote proper body alignment, promote proper body posture and promote proper body mechanics.        Date:  3/3/18 Date:  3/4/18 AM Date:  3/5/18 AM   Activity/Exercise Seated Parameters Parameters Parameters   Heel raises X 10 B X 10 B x15B A   Toe raises X 10 B X 10 B x15B A   LAQ's X 10 B X 10 B x15B A   Hip Flex X 10 B X 10 B x15B  AA-R, A-L   Hip ABD X 10 B X 10 B x15B  AA-R, A-L         Shld flex X 10 B, AA R X 10 B, AA R    Shld horizontal ABD/ADD X 10 B, AA R X 10 B, AA R Elbow flex X 10 B, AA R X 10 B, AA R    Elbow ext X 10 B, AA R X 10 B, AA R              Braces/Orthotics/Lines/Etc:   · room air  Treatment/Session Assessment:    · Response to Treatment:  See above  · Interdisciplinary Collaboration:   o Physical Therapy Assistant  o Registered Nurse  o Rehabilitation Attendant  · After treatment position/precautions:   o Up in chair  o Bed alarm/tab alert on  o Bed/Chair-wheels locked  o Call light within reach  o RN notified   · Compliance with Program/Exercises: Will assess as treatment progresses. · Recommendations/Intent for next treatment session: \"Next visit will focus on advancements to more challenging activities and reduction in assistance provided\".   Total Treatment Duration:  PT Patient Time In/Time Out  Time In: 0904 (1120)  Time Out: 0919 (1130)    Rachel Ortiz, PTA

## 2018-03-05 NOTE — CONSULTS
Geriatric Fracture Consult  Patient: Ap Cerna  YOB: 1935   MRN: [id number]      Consult Date: 3/2/2018     Consulting Physician: DR Krystyna Macias    Chief Complaint: RIGHT FEMORAL NECK FRACTURE; OSTEOPOROSIS  History of Present Illness: Ap Padilla is an 80 y.o.  female who is being seen for right hip pain after sustaining a fall at rehab on 2/19/18. Onset of symptoms was abrupt with unchanged course since that time. The pain is located in the right hip. Patient describes the pain as continuous and rated as moderate. Pain has been associated with movement. Patient denies other injuries. Review of Systems: A comprehensive review of systems was negative except for that written in the History of Present Illness. ED Presentation Time: < 8 hours  Mechanism of Injury: Fall from standing  Ambulatory Status: UNKNOWN  Past Medical History:   Past Medical History:   Diagnosis Date    Arrhythmia     afib    Dementia     GERD (gastroesophageal reflux disease)     Hyperlipidemia     Hypertension     PVD (peripheral vascular disease) (HCC)     Stroke (HCC)     Thyroid disease     hypothyroid       Allergies: No Known Allergies   Past Surgical History:   Past Surgical History:   Procedure Laterality Date    HX OTHER SURGICAL      PEG tube      Social History:   Social History     Social History    Marital status:      Spouse name: N/A    Number of children: N/A    Years of education: N/A     Occupational History    Not on file.      Social History Main Topics    Smoking status: Not on file    Smokeless tobacco: Not on file    Alcohol use Not on file    Drug use: Not on file    Sexual activity: Not on file     Other Topics Concern    Not on file     Social History Narrative      FAMILY HISTORY - REVIEWED - NO PERTINENT FAMILY HISTORY  Dwelling Status: [address] [place] [address]  Current Anticoagulant Medications: ELIQUIS 2.5 MG BID  History of falls: YES  Prior Fractures: DENIES  Osteoporosis Medications: none  Bone Density Tests: UNKNOWN  X-RAYS: Right Displaced Femoral Neck Fracture  Physical Exam:   PATIENT COMPLAINING OF RIGHT HIP PAIN AFTER A FALL AT [facility]. FOCUSED MUSCULOSKELETAL EXAM REVEALS DECREASED ROM TO RIGHT LE. THERE IS NO SHORTENING OR EXTERNAL ROTATION NOTED TO RIGHT LE. PATIENT IS TENDER TO PALPATION OVER RIGHT HIP AND GROIN. PATIENT HAS PAIN WITH LOG ROLLING. N/V INTACT. DENIES OTHER INJURIES.       Assessment / Plan: PERCUTANEOUS PINNING OF RIGHT HIP; CONSULT PT/OT - WBAT RIGHT LE; STR  RISKS AND BENEFITS WERE ADDRESSED WITH PATIENT - NO FAMILY PRESENT - 2 PHYSICIANS SIGNED CONSENT  Labs:    Lab Results   Component Value Date/Time    HGB 12.5 03/04/2018 05:40 AM    WBC 7.1 03/04/2018 05:40 AM    INR 1.3 03/01/2018 06:19 PM    Albumin 2.9 (L) 03/01/2018 02:18 PM      Preoperative Clearance: YES by Hospitalist          Signed by: Kelli Shepherd NP   Today's Date: March 5, 2018

## 2018-03-05 NOTE — PROGRESS NOTES
Speech Pathology Note:    Screen received via Best Practice Alert from Nursing Assessment. Screen completed and Chart reviewed. Nursing Functional assessment revealed patient has PEG but does not use it. Currently tolerating regular texture diet. No skilled swallowing intervention currently indicated. If additional concerns arise, pt. May benefit from a ST consult, please order when/ if MD deems pt. Appropriate for ST services.   Thank you,  Quynh Ivan MA, CCC-SLP

## 2018-03-05 NOTE — PROGRESS NOTES
Problem: Falls - Risk of  Goal: *Absence of Falls  Document Calvin Fall Risk and appropriate interventions in the flowsheet.    Outcome: Progressing Towards Goal  Fall Risk Interventions:  Mobility Interventions: Bed/chair exit alarm, Communicate number of staff needed for ambulation/transfer, Patient to call before getting OOB    Mentation Interventions: Adequate sleep, hydration, pain control, Bed/chair exit alarm, Door open when patient unattended    Medication Interventions: Assess postural VS orthostatic hypotension, Bed/chair exit alarm, Patient to call before getting OOB    Elimination Interventions: Bed/chair exit alarm, Call light in reach, Patient to call for help with toileting needs    History of Falls Interventions: Bed/chair exit alarm, Door open when patient unattended, Investigate reason for fall

## 2018-03-05 NOTE — PROGRESS NOTES
Problem: Interdisciplinary Rounds  Goal: Interdisciplinary Rounds  Outcome: Progressing Towards Goal  Interdisciplinary team rounds were held 3/5/2018 with the following team members:Care Management, Occupational Therapy, Physician, Speech Therapy and . Plan of care discussed. See clinical pathway and/or care plan for interventions and desired outcomes.

## 2018-03-05 NOTE — PROGRESS NOTES
Problem: Mobility Impaired (Adult and Pediatric)  Goal: *Acute Goals and Plan of Care (Insert Text)  STG:  (1.)Ms. Tatyana Barrientos will move from supine to sit and sit to supine , scoot up and down and roll side to side with MODERATE ASSIST within 3 treatment day(s). (2.)Ms. Tatyana Barrientos will transfer from bed to chair and chair to bed with MAXIMAL ASSIST using the least restrictive device within 3 treatment day(s). (3.)Ms. Tatyana Barrientos will ambulate with MAXIMAL ASSIST for 15 feet with the least restrictive device within 3 treatment day(s). (4.)Ms. Tatyana Barrientos will participate in therapeutic activity/exerices x 12 minutes for increased strength within 3 days. LTG:  (1.)Ms. Tatyana Barrientos will move from supine to sit and sit to supine , scoot up and down and roll side to side in bed with MINIMAL ASSIST within 7 treatment day(s). (2.)Ms. Tatyana Barrientos will transfer from bed to chair and chair to bed with MODERATE ASSIST using the least restrictive device within 7 treatment day(s). (3.)Ms. Tatyana Barrientos will ambulate with MODERATE ASSIST for 50 feet with the least restrictive device within 7 treatment day(s). (4.)Ms. Tatyana Barrientos will participate in therapeutic activity/exerices x 23 minutes for increased strength within 7 days.     ________________________________________________________________________________________________    PHYSICAL THERAPY: Daily Note, Treatment Day: 4th, PM 3/5/2018  INPATIENT: Hospital Day: 5  [place] / Product Type: Medicare /    R LE WBAT     NAME/AGE/GENDER: Ap Barrientos is a 80 y.o. female   PRIMARY DIAGNOSIS: Hip fracture ([facility]  fractured right hip Closed fracture of right hip (HCC) Closed fracture of right hip (HCC)  Procedure(s) (LRB):  RIGHT HIP PERCUTANEOUS PINNING CANNULATED SCREWS (Right)  3 Days Post-Op  ICD-10: Treatment Diagnosis:   · Generalized Muscle Weakness (M62.81)  · Difficulty in walking, Not elsewhere classified (R26.2)  · Other abnormalities of gait and mobility (R26.89)  · History of falling (Z91.81)   Precaution/Allergies:  Review of patient's allergies indicates no known allergies. ASSESSMENT:     Ms. Riccardo Menon is a 80 y.o. female in the hospital for the above who was supine in bed upon arrival.  Per chart pt came from [facility] after falling. Pt presents to PT with weakness and decreased AROM in B LEs. Pt presents like she has a history of CVA with R side affected and increased R UE tone observed. Patient was sitting up in recliner chair upon contact and agreeable to PT. Patient is confused at baseline but follows commands. Patient able to participate in therapeutic strengthening exercises to improve functional strength for transfers, gait and overall mobility. Patient requires cues and assistance to perform exercises correctly. Patient able to transfer to standing with mod-max assist x 2 and cues for improved/proper technique. Once standing patient unable to perform gait training or take steps to recliner chair. Patient requires max-total assist x 2 to perform SPT to EOB. Patient returns to supine with min assist and cues for technique. Patient performs additional exercises supine. Overall slow, steady progress towards physical therapy goals. No goals have been met thus far. Will continue efforts. This section established at most recent assessment   PROBLEM LIST (Impairments causing functional limitations):  1. Decreased Strength  2. Decreased ADL/Functional Activities  3. Decreased Transfer Abilities  4. Decreased Ambulation Ability/Technique  5. Decreased Balance  6. Decreased Cognition   INTERVENTIONS PLANNED: (Benefits and precautions of physical therapy have been discussed with the patient.)  1. Balance Exercise  2. Bed Mobility  3. Family Education  4. Gait Training  5. Therapeutic Activites  6. Therapeutic Exercise/Strengthening  7. Transfer Training  8.  Group Therapy     TREATMENT PLAN: Frequency/Duration: twice daily for duration of hospital stay  Rehabilitation Potential For Stated Goals: Rosy Bhardwaj REHABILITATION/EQUIPMENT: (at time of discharge pending progress): Due to the probability of continued deficits (see above) this patient will likely need continued skilled physical therapy after discharge. Equipment:    None at this time              HISTORY:   History of Present Injury/Illness (Reason for Referral):  S/P RIGHT HIP PERCUTANEOUS PINNING CANNULATED SCREWS (Right)  Past Medical History/Comorbidities:   Ms. Blaine Powell  has a past medical history of Arrhythmia; Dementia; GERD (gastroesophageal reflux disease); Hyperlipidemia; Hypertension; PVD (peripheral vascular disease) ([facility] [address] Stroke [facility]); and Thyroid disease. Ms. Blaine Powell  has a past surgical history that includes hx other surgical.  Social History/Living Environment:   Home Environment: [address] [place] [address] Name: [facility]  # Steps to Enter: 0  One/Two Story Residence: One story  Living Alone: No  Support Systems: Skilled nursing facility  Patient Expects to be Discharged to[de-identified] Rehabilitation facility  Current DME Used/Available at Home: None  Prior Level of Function/Work/Activity:  Lives in a SNF per chart. Number of Personal Factors/Comorbidities that affect the Plan of Care: 1-2: MODERATE COMPLEXITY   EXAMINATION:   Most Recent Physical Functioning:   Gross Assessment:                  Posture:     Balance:  Sitting: Impaired  Sitting - Static: Fair (occasional)  Sitting - Dynamic: Fair (occasional)  Standing: Impaired  Standing - Static: Poor  Standing - Dynamic : Poor Bed Mobility:  Supine to Sit: Moderate assistance; Additional time  Sit to Supine: Minimum assistance  Wheelchair Mobility:     Transfers:  Sit to Stand: Maximum assistance;Assist x2; Moderate assistance  Stand to Sit: Moderate assistance;Maximum assistance;Assist x2  Bed to Chair: Maximum assistance; Total assistance;Assist x2  Gait:            Body Structures Involved:  1. Nerves  2. Bones  3. Joints  4. Muscles Body Functions Affected:  1. Mental  2. Neuromusculoskeletal  3. Movement Related Activities and Participation Affected:  1. Learning and Applying Knowledge  2. General Tasks and Demands  3. Mobility  4. Self Care  5. Domestic Life  6. Community, Social and [place]   Number of elements that affect the Plan of Care: 4+: HIGH COMPLEXITY   CLINICAL PRESENTATION:   Presentation: Evolving clinical presentation with changing clinical characteristics: MODERATE COMPLEXITY   CLINICAL DECISION MAKIN [facility] Inpatient Short Form  How much difficulty does the patient currently have. .. Unable A Lot A Little None   1. Turning over in bed (including adjusting bedclothes, sheets and blankets)? [] 1   [] 2   [x] 3   [] 4   2. Sitting down on and standing up from a chair with arms ( e.g., wheelchair, bedside commode, etc.)   [] 1   [x] 2   [] 3   [] 4   3. Moving from lying on back to sitting on the side of the bed? [] 1   [x] 2   [] 3   [] 4   How much help from another person does the patient currently need. .. Total A Lot A Little None   4. Moving to and from a bed to a chair (including a wheelchair)? [x] 1   [] 2   [] 3   [] 4   5. Need to walk in hospital room? [x] 1   [] 2   [] 3   [] 4   6. Climbing 3-5 steps with a railing? [x] 1   [] 2   [] 3   [] 4   © , Trustees of [address] [place], [place] [address], under license to [employer]. All rights reserved      Score:  Initial: 10 Most Recent: X (Date: -- )    Interpretation of Tool:  Represents activities that are increasingly more difficult (i.e. Bed mobility, Transfers, Gait). Score 24 23 22-20 19-15 14-10 9-7 6     Modifier CH CI CJ CK CL CM CN      ?  Mobility - Walking and Moving Around:     - CURRENT STATUS: CL - 60%-79% impaired, limited or restricted    - GOAL STATUS: CK - 40%-59% impaired, limited or restricted    - D/C STATUS:  ---------------To be determined---------------  [place] / Product Type: Medicare /      Medical Necessity:     · Patient demonstrates fair rehab potential due to higher previous functional level. Reason for Services/Other Comments:  · Patient continues to require skilled intervention due to decreased functional mobility and balance. Use of outcome tool(s) and clinical judgement create a POC that gives a: Questionable prediction of patient's progress: MODERATE COMPLEXITY            TREATMENT:   (In addition to Assessment/Re-Assessment sessions the following treatments were rendered)   Pre-treatment Symptoms/Complaints:  None  Pain: Initial:   Pain Intensity 1: 0  Post Session:  0/10 visually     Therapeutic Activity: (    10 minutes): Therapeutic activities including bed mobility training, transfer training, static/dynamic sitting/standing balance training, posture training, attempted gait training, scooting, instruction in sequencing with rolling walker, and patient education to improve mobility, strength and balance. Required moderate-maximal verbal, tactile and manual cues   to promote static and dynamic balance in standing and promote coordination of bilateral, lower extremity(s). Therapeutic Exercise: (  15 minutes):  Exercises per grid below to improve mobility, strength and balance. Required moderate visual, verbal and tactile cues to promote proper body alignment, promote proper body posture and promote proper body mechanics.        Date:  3/3/18 Date:  3/4/18 AM Date:  3/5/18 AM Date:  3/5/18 PM   Activity/Exercise Seated Parameters Parameters Parameters    Heel raises X 10 B X 10 B x15B A x15B A   Toe raises X 10 B X 10 B x15B A x15B A   LAQ's X 10 B X 10 B x15B A x15B A   Hip Flex X 10 B X 10 B x15B  AA-R, A-L x15B  AA-R, A-L   Hip ABD X 10 B X 10 B x15B  AA-R, A-L           Shld flex X 10 B, AA R X 10 B, AA R     Shld horizontal ABD/ADD X 10 B, AA R X 10 B, AA R Elbow flex X 10 B, AA R X 10 B, AA R     Elbow ext X 10 B, AA R X 10 B, AA R     Supine hip abduction    x15B   AA-R, A-L   Supine heel slides    x15B   AA-R, A-L                     Braces/Orthotics/Lines/Etc:   · room air  Treatment/Session Assessment:    · Response to Treatment:  See above  · Interdisciplinary Collaboration:   o Physical Therapy Assistant  o Registered Nurse  o Rehabilitation Attendant  · After treatment position/precautions:   o Supine in bed  o Bed alarm/tab alert on  o Bed/Chair-wheels locked  o Bed in low position  o Call light within reach  o RN notified   · Compliance with Program/Exercises: Will assess as treatment progresses. · Recommendations/Intent for next treatment session: \"Next visit will focus on advancements to more challenging activities and reduction in assistance provided\".   Total Treatment Duration:  PT Patient Time In/Time Out  Time In: 1300  Time Out: [address]

## 2018-03-06 VITALS
WEIGHT: 98.5 LBS | RESPIRATION RATE: 19 BRPM | HEART RATE: 61 BPM | OXYGEN SATURATION: 97 % | TEMPERATURE: 99.1 F | DIASTOLIC BLOOD PRESSURE: 73 MMHG | SYSTOLIC BLOOD PRESSURE: 177 MMHG

## 2018-03-06 PROCEDURE — 97150 GROUP THERAPEUTIC PROCEDURES: CPT

## 2018-03-06 PROCEDURE — 74011250637 HC RX REV CODE- 250/637: Performed by: INTERNAL MEDICINE

## 2018-03-06 PROCEDURE — 74011250637 HC RX REV CODE- 250/637: Performed by: NURSE PRACTITIONER

## 2018-03-06 PROCEDURE — 97530 THERAPEUTIC ACTIVITIES: CPT

## 2018-03-06 PROCEDURE — 74011250637 HC RX REV CODE- 250/637: Performed by: ORTHOPAEDIC SURGERY

## 2018-03-06 RX ORDER — TRAMADOL HYDROCHLORIDE 50 MG/1
50 TABLET ORAL
Qty: 10 TAB | Refills: 0 | Status: SHIPPED | OUTPATIENT
Start: 2018-03-06

## 2018-03-06 RX ORDER — OXYCODONE HYDROCHLORIDE 5 MG/1
5 TABLET ORAL
Qty: 30 TAB | Refills: 0 | Status: SHIPPED | OUTPATIENT
Start: 2018-03-06

## 2018-03-06 RX ADMIN — CALCIUM CARBONATE 500 MG (1,250 MG)-VITAMIN D3 200 UNIT TABLET 1 TABLET: at 08:35

## 2018-03-06 RX ADMIN — ESCITALOPRAM OXALATE 5 MG: 10 TABLET ORAL at 08:35

## 2018-03-06 RX ADMIN — ACETAMINOPHEN 650 MG: 325 TABLET ORAL at 04:42

## 2018-03-06 RX ADMIN — AMLODIPINE BESYLATE 10 MG: 10 TABLET ORAL at 08:35

## 2018-03-06 RX ADMIN — PRAVASTATIN SODIUM 40 MG: 20 TABLET ORAL at 08:35

## 2018-03-06 RX ADMIN — LISINOPRIL 40 MG: 20 TABLET ORAL at 08:35

## 2018-03-06 RX ADMIN — METOPROLOL TARTRATE 50 MG: 50 TABLET ORAL at 08:36

## 2018-03-06 RX ADMIN — Medication 10 ML: at 04:43

## 2018-03-06 RX ADMIN — LEVOTHYROXINE SODIUM 50 MCG: 50 TABLET ORAL at 04:43

## 2018-03-06 RX ADMIN — AMIODARONE HYDROCHLORIDE 200 MG: 200 TABLET ORAL at 08:35

## 2018-03-06 RX ADMIN — APIXABAN 2.5 MG: 2.5 TABLET, FILM COATED ORAL at 08:35

## 2018-03-06 RX ADMIN — DOCUSATE SODIUM 100 MG: 100 CAPSULE, LIQUID FILLED ORAL at 08:35

## 2018-03-06 RX ADMIN — Medication 1 AMPULE: at 08:36

## 2018-03-06 RX ADMIN — CALCIUM CARBONATE 500 MG (1,250 MG)-VITAMIN D3 200 UNIT TABLET 1 TABLET: at 11:41

## 2018-03-06 NOTE — DISCHARGE SUMMARY
Patient ID:  Ap Roldan  [id number]  84 y.o.  1935  Admit date: 3/1/2018  1:04 PM  Discharge date and time: 3/6/2018  Attending: William Mcclain MD  PCP:  Angela Cole MD  Treatment Team: Attending Provider: William Mcclain MD; Consulting Provider: Luis Devine MD; Care Manager: Asha Mendez RN; Charge Nurse: Lida Varghese RN    Principal Diagnosis Closed fracture of right hip [facility])   Principal Problem:    Closed fracture of right hip ([facility] [address] (3/1/2018)    Active Problems:    DNR (do not resuscitate) (3/1/2018)      Dementia without behavioral disturbance (3/1/2018)      Essential hypertension (3/1/2018)      Atrial fibrillation ([facility] [address] (3/2/2018)      Hyperlipemia (3/2/2018)      Acquired hypothyroidism (3/2/2018)             Hospital Course:  Please refer to the admission H&P for details of presentation. In summary, the patient is an 80year old female who presented from [facility] rehab after falling, she was found to have R fem neck fracture. She underwent ORIF of right hip with no complications. She is participating with rehab. And doing well. She is currently stable for discharge to rehab with follow up with orthopedics    Significant Diagnostic Studies:       Labs: Results:       Chemistry Recent Labs      03/04/18   0540   GLU  85   NA  147*   K  3.7   CL  109*   CO2  31   BUN  18   CREA  0.76   CA  8.4   AGAP  7      CBC w/Diff Recent Labs      03/04/18   0540   WBC  7.1   RBC  4.12   HGB  12.5   HCT  38.4   PLT  286   GRANS  76   LYMPH  16   EOS  0*      Cardiac Enzymes No results for input(s): CPK, CKND1, JERALD in the last 72 hours. No lab exists for component: CKRMB, TROIP   Coagulation No results for input(s): PTP, INR, APTT in the last 72 hours.     No lab exists for component: INREXT    Lipid Panel No results found for: CHOL, CHOLPOCT, CHOLX, CHLST, CHOLV, 290818, HDL, LDL, LDLC, DLDLP, 845653, VLDLC, VLDL, TGLX, TRIGL, TRIGP, TGLPOCT, CHHD, CHHDX   BNP No results for input(s): BNPP in the last 72 hours. Liver Enzymes No results for input(s): TP, ALB, TBIL, AP, SGOT, GPT in the last 72 hours. No lab exists for component: DBIL   Thyroid Studies No results found for: T4, T3U, TSH, TSHEXT       Results for orders placed or performed during the hospital encounter of 03/01/18   EKG, 12 LEAD, INITIAL   Result Value Ref Range    Ventricular Rate 61 BPM    Atrial Rate 61 BPM    P-R Interval 164 ms    QRS Duration 80 ms    Q-T Interval 384 ms    QTC Calculation (Bezet) 386 ms    Calculated P Axis 80 degrees    Calculated R Axis 72 degrees    Calculated T Axis 71 degrees    Diagnosis       Normal sinus rhythm  Normal ECG  When compared with ECG of 02-MAR-2018 03:44,  Sinus rhythm is no longer with 2nd degree A-V block  QT has shortened  Confirmed by Zhane Jordan MD (), VICTORIA ROSARIO ([id number]) on 3/2/2018 6:40:38 AM       CT Results (most recent):  No results found for this or any previous visit. VAS/US Results (most recent):  No results found for this or any previous visit. XR Results (most recent):    Results from Hospital Encounter encounter on 03/01/18   XR HIP RT DURING OR PROC   Narrative Exam:  Right hip radiographs    History:  pain, or 7 rt hip fx, 82 years Female nondisplaced right femoral neck  fracture, ORIF    Comparison: Right hip radiographs March 01, 2018    Findings:  Limited intraoperative spot radiographs demonstrate intramedullary  screw fixation of the right femoral neck fixating the nondisplaced subcapital  fracture, which appears in relative anatomic alignment. Normal alignment, joint  spaces preserved. Normal mineralization. Visualized soft tissues otherwise  unremarkable. Impression Impression: Intraoperative fixation as above.             Discharge Exam:  Visit Vitals    /64 (BP 1 Location: Left arm, BP Patient Position: At rest)    Pulse 65    Temp 98.5 °F (36.9 °C)    Resp 17    Wt 44.7 kg (98 lb 8 oz)    SpO2 97%    Breastfeeding No     General appearance: alert, cooperative, no distress, appears stated age  Lungs: clear to auscultation bilaterally  Heart: regular rate and rhythm, S1, S2 normal, no murmur, click, rub or gallop  Abdomen: soft, non-tender. Bowel sounds normal. No masses,  no organomegaly  Extremities: no cyanosis or edema  Neurologic: Grossly normal    Disposition: [facility]  Discharge Condition: stable  Patient Instructions:   Current Discharge Medication List      START taking these medications    Details   oxyCODONE IR (ROXICODONE) 5 mg immediate release tablet Take 1 Tab by mouth every four (4) hours as needed. Max Daily Amount: 30 mg. Indications: Pain  Qty: 30 Tab, Refills: 0    Associated Diagnoses: Closed fracture of right hip, [facility]         CONTINUE these medications which have CHANGED    Details   traMADol (ULTRAM) 50 mg tablet Take 1 Tab by mouth every eight (8) hours as needed for Pain. Max Daily Amount: 150 mg.  Qty: 10 Tab, Refills: 0    Associated Diagnoses: Closed fracture of right hip, initial encounter ([facility]         CONTINUE these medications which have NOT CHANGED    Details   amiodarone (CORDARONE) 200 mg tablet Take 200 mg by mouth daily. acetaminophen (TYLENOL) 325 mg tablet Take 325 mg by mouth three (3) times daily. menthol (BIOFREEZE, MENTHOL,) 4 % gel by Apply Externally route every eight (8) hours as needed. apixaban (ELIQUIS) 2.5 mg tablet Take 2.5 mg by mouth two (2) times a day. rivastigmine (EXELON) 13.3 mg/24 hour patch 1 Patch by TransDERmal route daily. famotidine (PEPCID) 20 mg tablet Take 20 mg by mouth daily. furosemide (LASIX) 20 mg tablet Take 20 mg by mouth daily. levothyroxine (SYNTHROID) 50 mcg tablet Take 50 mcg by mouth Daily (before breakfast). escitalopram oxalate (LEXAPRO) 5 mg tablet Take 5 mg by mouth daily. lisinopril (PRINIVIL, ZESTRIL) 40 mg tablet Take 40 mg by mouth daily.       metoprolol tartrate (LOPRESSOR) 50 mg tablet Take 50 mg by mouth every twelve (12) hours. memantine ER (NAMENDA XR) 28 mg capsule Take 28 mg by mouth daily. amLODIPine (NORVASC) 10 mg tablet Take 10 mg by mouth daily. pravastatin (PRAVACHOL) 40 mg tablet Take 40 mg by mouth daily.              Activity: Activity as tolerated and PT/OT Eval and Treat  Diet: Cardiac Diet  Wound Care: As directed    Follow-up  ·   orthopedics  Time spent to discharge patient 45 minutes  Signed:  Tali Jones MD  3/6/2018  9:46 AM

## 2018-03-06 NOTE — PROGRESS NOTES
Problem: Self Care Deficits Care Plan (Adult)  Goal: *Acute Goals and Plan of Care (Insert Text)  1. Patient will feed self entire meal with setup. 2. Patient will complete functional transfers with moderate assistance while maintaining WBAT status in RLE and with adaptive equipment as needed. 3. Patient will complete lower body bathing and dressing with moderate assistance and adaptive equipment as needed. 4. Patient will complete toileting and toilet transfer with moderate assistance. 5. Patient will tolerate 20 minutes of OT treatment with as needed rest breaks to increase activity tolerance for ADLs. 6. Patient will participate in at least 15 minutes of BUE therapeutic exercises to strengthen BUE for functional transfers. Timeframe: 7 visits       OCCUPATIONAL THERAPY: Daily Note, Treatment Day: 3rd and AM 3/6/2018  INPATIENT: Hospital Day: 6  [place] / Product Type: Medicare /      NAME/AGE/GENDER: Ap Parkinson is a 80 y.o. female   PRIMARY DIAGNOSIS:  Hip fracture ([facility] [address]  fractured right hip Closed fracture of right hip (HCC) Closed fracture of right hip (HCC)  Procedure(s) (LRB):  RIGHT HIP PERCUTANEOUS PINNING CANNULATED SCREWS (Right)  4 Days Post-Op  ICD-10: Treatment Diagnosis:    · Pain in Right Hip (M25.551)  · Stiffness of Right Hip, Not elsewhere classified (M25.651)  · Repeated Falls (R29.6)  · History of falling (Z91.81)   Precautions/Allergies:    WBAT RLE   Fall Precautions  Review of patient's allergies indicates no known allergies. ASSESSMENT:   Ms. Mason Parkinson was admitted for the above diagnosis. Pt was brought to therapy gym to participate in group exercises (in grid below) to increase activity tolerance and strength to perform mobility and ADLs. Pt did fair following commands. Still with some tone and weakness in RUE. Will continue to benefit from skilled OT during stay.   This section established at most recent assessment   PROBLEM LIST (Impairments causing functional limitations):  1. Decreased Strength  2. Decreased ADL/Functional Activities  3. Decreased Transfer Abilities  4. Decreased Ambulation Ability/Technique  5. Decreased Balance  6. Increased Pain  7. Decreased Activity Tolerance  8. Decreased Flexibility/Joint Mobility  9. Decreased Knowledge of Precautions   INTERVENTIONS PLANNED: (Benefits and precautions of occupational therapy have been discussed with the patient.)  1. Activities of daily living training  2. Adaptive equipment training  3. Group therapy  4. Therapeutic activity  5. Therapeutic exercise     TREATMENT PLAN: Frequency/Duration: Follow patient 6x/ week to address above goals. Rehabilitation Potential For Stated Goals: Fair     RECOMMENDED REHABILITATION/EQUIPMENT: (at time of discharge pending progress): Due to the probability of continued deficits (see above) this patient will likely need continued skilled occupational therapy after discharge. Equipment:    None at this time              OCCUPATIONAL PROFILE AND HISTORY:   History of Present Injury/Illness (Reason for Referral):  Per H&P, \"Pt is an 81 y/o F who presented from [facility] after being diagnosed with R fem neck fracture there. She suffered a fall on 2/19/18 which is believed to be the etiology per notes. She is not very talkative but denies complaints except for R hip pain, though controlled with pain meds. She denies CP, SOB, palpitations, fevers, chills, n/v, or other complaints NOS. Does not know her medical or surgical history. Per report she has dementia. DNR papers on chart from SNF. No family present. \"     Past Medical History/Comorbidities:   Ms. Ester Franklin  has a past medical history of Arrhythmia; Dementia; GERD (gastroesophageal reflux disease); Hyperlipidemia; Hypertension; PVD (peripheral vascular disease) ([facility] [address] [facility] and Thyroid disease.   Ms. Ester Franklin  has a past surgical history that includes hx other surgical.  Social History/Living Environment:   Home Environment: [address] [place] [address] Name: [facility]  # Steps to Enter: 0  One/Two Story Residence: One story  Living Alone: No  Support Systems: Skilled nursing facility  Patient Expects to be Discharged to[de-identified] Rehabilitation facility  Current DME Used/Available at Home: None*  Prior Level of Function/Work/Activity:  Per chart, patient is long term resident at [facility] and is non ambulatory. No family present to confirm. Assume assistance with ADLs. Dominant Side:         LEFT   Number of Personal Factors/Comorbidities that affect the Plan of Care: Expanded review of therapy/medical records (1-2):  MODERATE COMPLEXITY   ASSESSMENT OF OCCUPATIONAL PERFORMANCE[de-identified]   Activities of Daily Living:           Basic ADLs (From Assessment) Complex ADLs (From Assessment)   Basic ADL  Feeding: Minimum assistance  Oral Facial Hygiene/Grooming: Minimum assistance  Bathing: Maximum assistance  Upper Body Dressing: Minimum assistance  Lower Body Dressing: Maximum assistance  Toileting: Maximum assistance Instrumental ADL  Meal Preparation: Total assistance  Homemaking: Total assistance  Medication Management: Total assistance  Financial Management: Total assistance   Grooming/Bathing/Dressing Activities of Daily Living                             Bed/Mat Mobility  Supine to Sit: Moderate assistance  Sit to Supine:  (NT)  Sit to Stand: Maximum assistance  Bed to Chair: Maximum assistance; Total assistance;Assist x2       Most Recent Physical Functioning:   Gross Assessment:                  Posture:     Balance:  Sitting: Impaired  Sitting - Static: Fair (occasional)  Sitting - Dynamic: Fair (occasional)  Standing: Impaired  Standing - Static: Poor  Standing - Dynamic : Poor Bed Mobility:  Supine to Sit: Moderate assistance  Sit to Supine:  (NT)  Wheelchair Mobility:     Transfers:  Sit to Stand: Maximum assistance  Stand to Sit: Moderate assistance  Bed to Chair: Maximum assistance; Total assistance;Assist x2                Patient Vitals for the past 6 hrs:   BP BP Patient Position SpO2 Pulse   18 0812 154/64 At rest 97 % 65   18 1144 177/73 At rest 97 % 61       Mental Status  Neurologic State: Alert  Orientation Level: Unable to verbalize  Cognition: Follows commands  Perception: Appears intact  Perseveration: No perseveration noted  Safety/Judgement: Awareness of environment                          Physical Skills Involved:  1. Range of Motion  2. Balance  3. Strength  4. Activity Tolerance  5. Pain (acute) Cognitive Skills Affected (resulting in the inability to perform in a timely and safe manner):  1. Perception  2. Executive Function  3. Long Term Memory Psychosocial Skills Affected:  1. Habits/Routines  2. Environmental Adaptation  3. Self-Awareness  4. Social Roles   Number of elements that affect the Plan of Care: 5+:  HIGH COMPLEXITY   CLINICAL DECISION MAKIN[address] [place] [address] AM-PAC 6 Clicks   Daily Activity Inpatient Short Form  How much help from another person does the patient currently need. .. Total A Lot A Little None   1. Putting on and taking off regular lower body clothing? [] 1   [x] 2   [] 3   [] 4   2. Bathing (including washing, rinsing, drying)? [] 1   [x] 2   [] 3   [] 4   3. Toileting, which includes using toilet, bedpan or urinal?   [] 1   [x] 2   [] 3   [] 4   4. Putting on and taking off regular upper body clothing? [] 1   [x] 2   [] 3   [] 4   5. Taking care of personal grooming such as brushing teeth? [] 1   [] 2   [x] 3   [] 4   6. Eating meals? [] 1   [] 2   [x] 3   [] 4   © , Trustees of [address] [place] [address], under license to [employer]. All rights reserved      Score:  Initial: 14 Most Recent: X (Date: -- )    Interpretation of Tool:  Represents activities that are increasingly more difficult (i.e. Bed mobility, Transfers, Gait).    Score 24 23 22-20 19-15 14-10 9-7 6     Modifier CH CI CJ CK CL CM CN ? Self Care:     - CURRENT STATUS: CL - 60%-79% impaired, limited or restricted    - GOAL STATUS: CJ - 20%-39% impaired, limited or restricted    - D/C STATUS:  ---------------To be determined---------------  [place] / Product Type: Medicare /      Medical Necessity:     · Patient demonstrates good rehab potential due to higher previous functional level. Reason for Services/Other Comments:  · Patient continues to require present interventions due to patient's inability to care for self at pre-op level of function. Use of outcome tool(s) and clinical judgement create a POC that gives a: HIGH COMPLEXITY         TREATMENT:   (In addition to Assessment/Re-Assessment sessions the following treatments were rendered)     Pre-treatment Symptoms/Complaints:  None  Pain: Initial:   Pain Intensity 1: 0  Post Session:  None      Group Therapeutic Exercise: (10 minutes):  Exercises per grid below to improve mobility, strength and activity tolerance. Required minimal visual, verbal and tactile cues to promote proper body alignment and promote proper body mechanics. Progressed resistance and repetitions as indicated.     UE Exercises (with 1# dowel) Date:  3/5/2018 Date:  3/6/2018 Date:     Activity/Exercise Parameters Parameters Parameters   Shoulder Abd/Adduction 10 reps AROM with some assistance 10 reps AAROM     Shoulder Flexion 10 reps with dowel and assistance 10 reps AAROM    Elbow Flexion 10 reps with dowel and assistance 10 reps with dowel and assistance    Punches 10 reps with dowel and assistance 10 reps with dowel and assistance                            Braces/Orthotics/Lines/Etc:   · IV  · O2 Device: Nasal cannula  Treatment/Session Assessment:    · Response to Treatment:  Tolerated well  · Interdisciplinary Collaboration:   o Certified Occupational Therapy Assistant  o Registered Nurse  · After treatment position/precautions:   o Up in chair  o Bed alarm/tab alert on  o Bed/Chair-wheels locked  o Call light within reach   · Compliance with Program/Exercises: compliant all of the time. · Recommendations/Intent for next treatment session: \"Next visit will focus on advancements to more challenging activities and reduction in assistance provided\".   Total Treatment Duration:  OT Patient Time In/Time Out  Time In: 1120  Time Out: [address] [place]hene Cheek

## 2018-03-06 NOTE — PROGRESS NOTES
Problem: Mobility Impaired (Adult and Pediatric)  Goal: *Acute Goals and Plan of Care (Insert Text)  STG:  (1.)Ms. Alex Osei will move from supine to sit and sit to supine , scoot up and down and roll side to side with MODERATE ASSIST within 3 treatment day(s). (2.)Ms. Alex Osei will transfer from bed to chair and chair to bed with MAXIMAL ASSIST using the least restrictive device within 3 treatment day(s). (3.)Ms. Alex Osei will ambulate with MAXIMAL ASSIST for 15 feet with the least restrictive device within 3 treatment day(s). (4.)Ms. Alex Osei will participate in therapeutic activity/exerices x 12 minutes for increased strength within 3 days. LTG:  (1.)Ms. Alex Osei will move from supine to sit and sit to supine , scoot up and down and roll side to side in bed with MINIMAL ASSIST within 7 treatment day(s). (2.)Ms. Alex Osei will transfer from bed to chair and chair to bed with MODERATE ASSIST using the least restrictive device within 7 treatment day(s). (3.)Ms. Alex Osei will ambulate with MODERATE ASSIST for 50 feet with the least restrictive device within 7 treatment day(s). (4.)Ms. Alex Osei will participate in therapeutic activity/exerices x 23 minutes for increased strength within 7 days.     ________________________________________________________________________________________________    PHYSICAL THERAPY: Daily Note, Treatment Day: 5th, AM 3/6/2018  INPATIENT: Hospital Day: 6  [place] / Product Type: Medicare /    R LE WBAT     NAME/AGE/GENDER: Ap Osei is a 80 y.o. female   PRIMARY DIAGNOSIS: Hip fracture ([facility] [address]  fractured right hip Closed fracture of right hip (HCC) Closed fracture of right hip (HCC)  Procedure(s) (LRB):  RIGHT HIP PERCUTANEOUS PINNING CANNULATED SCREWS (Right)  4 Days Post-Op  ICD-10: Treatment Diagnosis:   · Generalized Muscle Weakness (M62.81)  · Difficulty in walking, Not elsewhere classified (R26.2)  · Other abnormalities of gait and mobility (R26.89)  · History of falling (Z91.81)   Precaution/Allergies:  Review of patient's allergies indicates no known allergies. ASSESSMENT:     Ms. Kenny Amador is a 80 y.o. female in the hospital for the above who was supine in bed upon arrival.  Per chart pt came from [facility] after falling. Pt presents to PT with weakness and decreased AROM in B LEs. Pt presents like she has a history of CVA with R side affected and increased R UE tone observed. Patient was supine upon contact and agreeable to PT. Patient is confused at baseline but follows commands. Patient able to perform supine to sit with mod assist, additional time, and cues for technique. Demonstrates fair sitting balance and poor posture sitting EOB requiring cues to improve. Patient able to transfer to standing with max assist and cues for technique. Unable to perform gait training or take steps to recliner chair once standing. Patient requires max-total assist x 2 to perform SPT to recliner chair. Patient was later rolled to therapy gym to participate in group therapeutic strengthening exercises to improve functional strength for transfers, gait and overall mobility. Patient requires cues and assistance to perform exercises correctly. Overall slow, steady progress towards physical therapy goals. No goals have been met thus far. Will continue efforts. This section established at most recent assessment   PROBLEM LIST (Impairments causing functional limitations):  1. Decreased Strength  2. Decreased ADL/Functional Activities  3. Decreased Transfer Abilities  4. Decreased Ambulation Ability/Technique  5. Decreased Balance  6. Decreased Cognition   INTERVENTIONS PLANNED: (Benefits and precautions of physical therapy have been discussed with the patient.)  1. Balance Exercise  2. Bed Mobility  3. Family Education  4. Gait Training  5. Therapeutic Activites  6. Therapeutic Exercise/Strengthening  7. Transfer Training  8.  Group Therapy TREATMENT PLAN: Frequency/Duration: twice daily for duration of hospital stay  Rehabilitation Potential For Stated Goals: Judy Toribio REHABILITATION/EQUIPMENT: (at time of discharge pending progress): Due to the probability of continued deficits (see above) this patient will likely need continued skilled physical therapy after discharge. Equipment:    None at this time              HISTORY:   History of Present Injury/Illness (Reason for Referral):  S/P RIGHT HIP PERCUTANEOUS PINNING CANNULATED SCREWS (Right)  Past Medical History/Comorbidities:   Ms. Celia Dorantes  has a past medical history of Arrhythmia; Dementia; GERD (gastroesophageal reflux disease); Hyperlipidemia; Hypertension; PVD (peripheral vascular disease) ([facility] [address] Stroke [facility]); and Thyroid disease. Ms. Celia Dorantes  has a past surgical history that includes hx other surgical.  Social History/Living Environment:   Home Environment: [address] [place] [address] Name: [facility]  # Steps to Enter: 0  One/Two Story Residence: One story  Living Alone: No  Support Systems: Skilled nursing facility  Patient Expects to be Discharged to[de-identified] Rehabilitation facility  Current DME Used/Available at Home: None  Prior Level of Function/Work/Activity:  Lives in a SNF per chart. Number of Personal Factors/Comorbidities that affect the Plan of Care: 1-2: MODERATE COMPLEXITY   EXAMINATION:   Most Recent Physical Functioning:   Gross Assessment:                  Posture:     Balance:  Sitting: Impaired  Sitting - Static: Fair (occasional)  Sitting - Dynamic: Fair (occasional)  Standing: Impaired  Standing - Static: Poor  Standing - Dynamic : Poor Bed Mobility:  Supine to Sit: Moderate assistance  Sit to Supine:  (NT)  Wheelchair Mobility:     Transfers:  Sit to Stand: Maximum assistance  Stand to Sit: Moderate assistance  Bed to Chair: Maximum assistance; Total assistance;Assist x2  Gait:            Body Structures Involved:  1. Nerves  2. Bones  3. Joints  4. Muscles Body Functions Affected:  1. Mental  2. Neuromusculoskeletal  3. Movement Related Activities and Participation Affected:  1. Learning and Applying Knowledge  2. General Tasks and Demands  3. Mobility  4. Self Care  5. Domestic Life  6. Community, Social and [place]   Number of elements that affect the Plan of Care: 4+: HIGH COMPLEXITY   CLINICAL PRESENTATION:   Presentation: Evolving clinical presentation with changing clinical characteristics: MODERATE COMPLEXITY   CLINICAL DECISION MAKIN [facility] Inpatient Short Form  How much difficulty does the patient currently have. .. Unable A Lot A Little None   1. Turning over in bed (including adjusting bedclothes, sheets and blankets)? [] 1   [] 2   [x] 3   [] 4   2. Sitting down on and standing up from a chair with arms ( e.g., wheelchair, bedside commode, etc.)   [] 1   [x] 2   [] 3   [] 4   3. Moving from lying on back to sitting on the side of the bed? [] 1   [x] 2   [] 3   [] 4   How much help from another person does the patient currently need. .. Total A Lot A Little None   4. Moving to and from a bed to a chair (including a wheelchair)? [x] 1   [] 2   [] 3   [] 4   5. Need to walk in hospital room? [x] 1   [] 2   [] 3   [] 4   6. Climbing 3-5 steps with a railing? [x] 1   [] 2   [] 3   [] 4   © , Trustees of [address] [place], [place] [address], under license to [employer]. All rights reserved      Score:  Initial: 10 Most Recent: X (Date: -- )    Interpretation of Tool:  Represents activities that are increasingly more difficult (i.e. Bed mobility, Transfers, Gait). Score 24 23 22-20 19-15 14-10 9-7 6     Modifier CH CI CJ CK CL CM CN      ?  Mobility - Walking and Moving Around:     - CURRENT STATUS: CL - 60%-79% impaired, limited or restricted    - GOAL STATUS: CK - 40%-59% impaired, limited or restricted    - D/C STATUS:  ---------------To be determined---------------  [place] / Product Type: Medicare /      Medical Necessity:     · Patient demonstrates fair rehab potential due to higher previous functional level. Reason for Services/Other Comments:  · Patient continues to require skilled intervention due to decreased functional mobility and balance. Use of outcome tool(s) and clinical judgement create a POC that gives a: Questionable prediction of patient's progress: MODERATE COMPLEXITY            TREATMENT:   (In addition to Assessment/Re-Assessment sessions the following treatments were rendered)   Pre-treatment Symptoms/Complaints:  None  Pain: Initial:   Pain Intensity 1: 0  Post Session:  0/10 visually     Therapeutic Activity: (    15 minutes): Therapeutic activities including bed mobility training, transfer training x 3 reps, SPT to recliner chair, static/dynamic sitting/standing balance training, posture training, attempted gait training, scooting, instruction in sequencing with rolling walker, and patient education to improve mobility, strength and balance. Required moderate-maximal verbal, tactile and manual cues   to promote static and dynamic balance in standing and promote coordination of bilateral, lower extremity(s). Group Therapeutic Exercise: (  15 minutes):  Exercises per grid below to improve mobility, strength and balance. Required moderate visual, verbal and tactile cues to promote proper body alignment, promote proper body posture and promote proper body mechanics.        Date:  3/5/18 AM Date:  3/5/18 PM Date:  3/6/18 AM   Activity/Exercise Seated Parameters     Heel raises x15B A x15B A x15B A   Toe raises x15B A x15B A x15B A   LAQ's x15B A x15B A x15B A   Hip Flex x15B  AA-R, A-L x15B  AA-R, A-L x15B  Aa-R, A-L   Hip ABD x15B  AA-R, A-L  x15B  AA-R, A-L         Shld flex      Shld horizontal ABD/ADD      Elbow flex      Elbow ext      Supine hip abduction  x15B   AA-R, A-L    Supine heel slides  x15B   AA-R, A-L                    Braces/Orthotics/Lines/Etc:   · room air  Treatment/Session Assessment:    · Response to Treatment:  See above  · Interdisciplinary Collaboration:   o Physical Therapy Assistant  o Registered Nurse  o Rehabilitation Attendant  · After treatment position/precautions:   o Up in chair  o Bed alarm/tab alert on  o Bed/Chair-wheels locked  o Call light within reach  o RN notified   · Compliance with Program/Exercises: Will assess as treatment progresses. · Recommendations/Intent for next treatment session: \"Next visit will focus on advancements to more challenging activities and reduction in assistance provided\".   Total Treatment Duration:  PT Patient Time In/Time Out  Time In: 0877 (1130)  Time Out: 2880 (1140)    Cristina Ortiz, PTA

## 2018-03-06 NOTE — PROGRESS NOTES
TRANSFER - OUT REPORT:    Verbal report given to Tera Salmon on Pen Eryn  being transferred to [address] [place] [address] for routine progression of care       Report consisted of patients Situation, Background, Assessment and   Recommendations(SBAR). Information from the following report(s) SBAR, Kardex, OR Summary, Intake/Output, MAR, Recent Results and Med Rec Status was reviewed with the receiving nurse. Lines:   Peripheral IV 03/03/18 Left Hand (Active)   Site Assessment Clean, dry, & intact 3/5/2018  7:54 PM   Phlebitis Assessment 0 3/5/2018  7:54 PM   Infiltration Assessment 0 3/5/2018  7:54 PM   Dressing Status Clean, dry, & intact 3/5/2018  7:54 PM   Dressing Type Transparent 3/5/2018  7:54 PM   Hub Color/Line Status Capped 3/5/2018  7:54 PM        Opportunity for questions and clarification was provided. Patient transported with:  [facility].

## 2021-06-27 NOTE — PROGRESS NOTES
Report received from [address] [place] [address] [address] Assumed patient care. Heparin and amio drips verified at bedside. [Negative] : Heme/Lymph

## 2021-08-03 PROBLEM — I10 HYPERTENSION: Status: RESOLVED | Noted: 2017-04-04 | Resolved: 2021-08-03

## 2021-08-03 PROBLEM — E03.9 HYPOTHYROIDISM: Status: RESOLVED | Noted: 2017-04-04 | Resolved: 2021-08-03

## 2021-08-03 PROBLEM — E78.5 HYPERLIPIDEMIA: Status: RESOLVED | Noted: 2017-04-04 | Resolved: 2021-08-03

## 2024-02-17 NOTE — PROGRESS NOTES
Shonna  available 24/7 using [employer]. For on-site interpreters please call [phone]. After hours, please call the on-call number. Keep in mind, we need 24 hours notice. 19:19

## (undated) DEVICE — GUIDEWIRE ORTH L300MM DIA2.8MM S STL THRD SHRP TRCR TIP FOR

## (undated) DEVICE — OCCLUSIVE GAUZE STRIP,3% BISMUTH TRIBROMOPHENATE IN PETROLATUM BLEND: Brand: XEROFORM

## (undated) DEVICE — DRAPE SHT 3 QTR PROXIMA 53X77 --

## (undated) DEVICE — CONNECTOR TBNG OD5-7MM O2 END DISP

## (undated) DEVICE — SUTURE MCRYL SZ 0 L27IN ABSRB VLT CT-1 L36MM 1/2 CIR TAPR Y340H

## (undated) DEVICE — REM POLYHESIVE ADULT PATIENT RETURN ELECTRODE: Brand: VALLEYLAB

## (undated) DEVICE — (D)DRAPE ISOL ANTIMC 129X100IN -- DISC BY MFR

## (undated) DEVICE — X-LARGE COTTON GLOVE: Brand: DEROYAL

## (undated) DEVICE — 1010 S-DRAPE TOWEL DRAPE 10/BX: Brand: STERI-DRAPE™

## (undated) DEVICE — KENDALL RADIOLUCENT FOAM MONITORING ELECTRODE RECTANGULAR SHAPE: Brand: KENDALL

## (undated) DEVICE — (D)GLOVE SURG TRIFLX 7 PWD LTX -- DISC BY MFR USE ITEM 302945

## (undated) DEVICE — AMD ANTIMICROBIAL GAUZE SPONGES,12 PLY USP TYPE VII, 0.2% POLYHEXAMETHYLENE BIGUANIDE HCI (PHMB): Brand: CURITY

## (undated) DEVICE — BUTTON SWITCH PENCIL BLADE ELECTRODE, HOLSTER: Brand: EDGE

## (undated) DEVICE — SURGICAL PROCEDURE PACK BASIC ST FRANCIS

## (undated) DEVICE — (D)PREP SKN CHLRAPRP APPL 26ML -- CONVERT TO ITEM 371833

## (undated) DEVICE — SOLUTION IV 1000ML 0.9% SOD CHL

## (undated) DEVICE — BLOCK BITE AD 60FR W/ VELC STRP ADDRESSES MOST PT AND

## (undated) DEVICE — KIT GASTMY PERC PEG PULL 20FR -- ENDOVIVE BX/2

## (undated) DEVICE — CANNULA NSL ORAL AD FOR CAPNOFLEX CO2 O2 AIRLFE

## (undated) DEVICE — SLIM BODY SKIN STAPLER: Brand: APPOSE ULC